# Patient Record
Sex: MALE
[De-identification: names, ages, dates, MRNs, and addresses within clinical notes are randomized per-mention and may not be internally consistent; named-entity substitution may affect disease eponyms.]

---

## 2017-03-31 ENCOUNTER — RECORD ABSTRACTING (OUTPATIENT)
Age: 82
End: 2017-03-31

## 2017-03-31 DIAGNOSIS — E79.0 HYPERURICEMIA W/OUT SIGNS OF INFLAMMATORY ARTHRITIS AND TOPHACEOUS DISEASE: ICD-10-CM

## 2017-03-31 DIAGNOSIS — K59.00 CONSTIPATION, UNSPECIFIED: ICD-10-CM

## 2017-03-31 DIAGNOSIS — Z78.9 OTHER SPECIFIED HEALTH STATUS: ICD-10-CM

## 2017-03-31 PROBLEM — Z00.00 ENCOUNTER FOR PREVENTIVE HEALTH EXAMINATION: Status: ACTIVE | Noted: 2017-03-31

## 2020-02-19 ENCOUNTER — APPOINTMENT (OUTPATIENT)
Dept: CARDIOLOGY | Facility: CLINIC | Age: 85
End: 2020-02-19
Payer: MEDICARE

## 2020-02-19 VITALS
HEART RATE: 78 BPM | DIASTOLIC BLOOD PRESSURE: 70 MMHG | HEIGHT: 61 IN | SYSTOLIC BLOOD PRESSURE: 130 MMHG | BODY MASS INDEX: 25.49 KG/M2 | WEIGHT: 135 LBS

## 2020-02-19 PROCEDURE — 93000 ELECTROCARDIOGRAM COMPLETE: CPT

## 2020-02-19 PROCEDURE — 99204 OFFICE O/P NEW MOD 45 MIN: CPT

## 2020-02-19 RX ORDER — PANTOPRAZOLE 40 MG/1
40 TABLET, DELAYED RELEASE ORAL DAILY
Qty: 30 | Refills: 0 | Status: ACTIVE | COMMUNITY

## 2020-02-19 RX ORDER — SENNOSIDES 8.6 MG TABLETS 8.6 MG/1
8.6 TABLET ORAL TWICE DAILY
Refills: 0 | Status: ACTIVE | COMMUNITY

## 2020-02-19 RX ORDER — ATORVASTATIN CALCIUM 40 MG/1
40 TABLET, FILM COATED ORAL
Qty: 30 | Refills: 5 | Status: ACTIVE | COMMUNITY

## 2020-02-19 NOTE — PHYSICAL EXAM
[General Appearance - Well Developed] : well developed [Normal Appearance] : normal appearance [Well Groomed] : well groomed [General Appearance - Well Nourished] : well nourished [No Deformities] : no deformities [General Appearance - In No Acute Distress] : no acute distress [Normal Conjunctiva] : the conjunctiva exhibited no abnormalities [Eyelids - No Xanthelasma] : the eyelids demonstrated no xanthelasmas [Normal Oral Mucosa] : normal oral mucosa [No Oral Pallor] : no oral pallor [No Oral Cyanosis] : no oral cyanosis [Normal Jugular Venous A Waves Present] : normal jugular venous A waves present [Normal Jugular Venous V Waves Present] : normal jugular venous V waves present [No Jugular Venous Shirley A Waves] : no jugular venous shirley A waves [Respiration, Rhythm And Depth] : normal respiratory rhythm and effort [Exaggerated Use Of Accessory Muscles For Inspiration] : no accessory muscle use [Auscultation Breath Sounds / Voice Sounds] : lungs were clear to auscultation bilaterally [Heart Rate And Rhythm] : heart rate and rhythm were normal [Heart Sounds] : normal S1 and S2 [Murmurs] : no murmurs present [Abdomen Soft] : soft [Abdomen Tenderness] : non-tender [Abdomen Mass (___ Cm)] : no abdominal mass palpated [Abnormal Walk] : normal gait [Gait - Sufficient For Exercise Testing] : the gait was sufficient for exercise testing [Nail Clubbing] : no clubbing of the fingernails [Cyanosis, Localized] : no localized cyanosis [Petechial Hemorrhages (___cm)] : no petechial hemorrhages [Skin Color & Pigmentation] : normal skin color and pigmentation [] : no rash [No Venous Stasis] : no venous stasis [Skin Lesions] : no skin lesions [No Skin Ulcers] : no skin ulcer [No Xanthoma] : no  xanthoma was observed [Affect] : the affect was normal [Mood] : the mood was normal [Oriented To Time, Place, And Person] : oriented to person, place, and time [No Anxiety] : not feeling anxious

## 2020-02-19 NOTE — HISTORY OF PRESENT ILLNESS
[FreeTextEntry1] : refered by dr. byrd for chest pain\par hypercholesteremia and htn are his risk factors for cad\par c/o exertional predictable chest pain, no associated sob\par always exertional, never at rest and relieved with rest\par no other cardiac problems\par \par ros is nbegative

## 2020-02-21 ENCOUNTER — APPOINTMENT (OUTPATIENT)
Dept: CARDIOLOGY | Facility: CLINIC | Age: 85
End: 2020-02-21
Payer: MEDICARE

## 2020-02-21 PROCEDURE — 93306 TTE W/DOPPLER COMPLETE: CPT

## 2020-02-25 ENCOUNTER — APPOINTMENT (OUTPATIENT)
Dept: SURGERY | Facility: CLINIC | Age: 85
End: 2020-02-25
Payer: MEDICARE

## 2020-02-25 VITALS — BODY MASS INDEX: 24.55 KG/M2 | WEIGHT: 130 LBS | HEIGHT: 61 IN

## 2020-02-25 DIAGNOSIS — K40.90 UNILATERAL INGUINAL HERNIA, W/OUT OBSTRUCTION OR GANGRENE, NOT SPECIFIED AS RECURRENT: ICD-10-CM

## 2020-02-25 DIAGNOSIS — K43.9 VENTRAL HERNIA W/OUT OBSTRUCTION OR GANGRENE: ICD-10-CM

## 2020-02-25 PROCEDURE — 99203 OFFICE O/P NEW LOW 30 MIN: CPT

## 2020-02-25 NOTE — PHYSICAL EXAM
[Normal Breath Sounds] : Normal breath sounds [No Rash or Lesion] : No rash or lesion [Alert] : alert [Calm] : calm [JVD] : no jugular venous distention  [de-identified] : soft and flat abdomen\par  [de-identified] : normal [de-identified] : healthy [de-identified] : normal testicles [de-identified] : large left inguinal hernia, left spigelian hernia, both reducible

## 2020-02-25 NOTE — ASSESSMENT
[FreeTextEntry1] : Kael is a pleasant 84-year-old retired [place] gentleman presenting to the office with his daughter-in-law (who acted as  at his request) with a past medical history significant for hypertension, hypercholesterolemia, GERD and psoriasis who has been complaining of worsening pain and swelling in the left groin suspicious for a hernia. He had a right inguinal hernia repair 10 years ago by a surgeon in [place].\par \par Physical examination demonstrates a large orange-sized tender bulge in the left groin which is reducible with a moderate degree of difficulty consistent with a large now symptomatic left inguinal hernia requiring surgical repair. Also, he has a another bulge in the left lower quadrant several fingerbreadths above this area the size of an egg which is only minimally tender and also easily reducible consistent with a left spigelian hernia deserving concomitant repair. Examination of his right groin demonstrates a mild weakness with no evidence of hernia recurrence. Both testicles are normal. His umbilical examination is unremarkable.\par \par I explained the pros and cons of surgery, as well as all risks, benefits, indications and alternatives of the procedure and the patient understood and agreed. He is currently in the process of undergoing a preoperative cardiac workup and will call us to schedule his procedure once he has been cleared. A complicating factor is that he is planning on spending 5 months in [place] beginning in mid March. I suggested that he consider pushing his trip back several weeks and having this procedure done prior to leaving to reduce the risk of having an emergency situation develop overseas. His daughter-in-law will call us once she sorts these issues out to schedule his procedure. Kael is aware that the repair of his left inguinal hernia with mesh and the repair of his left spigelian hernia with mesh will be done under LOCAL WITH IV SEDATION at [facility] with presurgical testing waived.  The patient was encouraged to avoid heavy lifting and strenuous activity in the interim, of course.\par \par Hopefully, his cardiologist will be kind enough to send us a short preoperative evaluation note once he has been cleared.

## 2020-02-25 NOTE — DATA REVIEWED
[FreeTextEntry1] : I reviewed his most recent echocardiogram which demonstrated normal left ventricular size and function with an estimated ejection fraction of 60-65%. However, he does have moderate to severe mitral regurgitation.

## 2020-02-25 NOTE — CONSULT LETTER
[Dear  ___] : Dear  [unfilled], [Courtesy Letter:] : I had the pleasure of seeing your patient, [unfilled], in my office today. [Please see my note below.] : Please see my note below. [Consult Closing:] : Thank you very much for allowing me to participate in the care of this patient.  If you have any questions, please do not hesitate to contact me. [DrScotty  ___] : Dr. BRYAN [FreeTextEntry3] : Respectfully,\par \par Felix Batista M.D., FACS\par

## 2020-02-26 ENCOUNTER — APPOINTMENT (OUTPATIENT)
Dept: CARDIOLOGY | Facility: CLINIC | Age: 85
End: 2020-02-26
Payer: MEDICARE

## 2020-02-26 VITALS
BODY MASS INDEX: 24.94 KG/M2 | HEART RATE: 109 BPM | SYSTOLIC BLOOD PRESSURE: 118 MMHG | WEIGHT: 132 LBS | DIASTOLIC BLOOD PRESSURE: 70 MMHG

## 2020-02-26 PROCEDURE — 93000 ELECTROCARDIOGRAM COMPLETE: CPT

## 2020-02-26 PROCEDURE — 99214 OFFICE O/P EST MOD 30 MIN: CPT

## 2020-02-26 NOTE — HISTORY OF PRESENT ILLNESS
[FreeTextEntry1] : refered by dr. byrd for chest pain\par hypercholesteremia and htn are his risk factors for cad\par c/o exertional predictable chest pain, with associated sob\par always exertional, never at rest and relieved with rest\par no other cardiac problems\par \par ros is nbegative\par \par echo revealed moderate to severe mitral insufficiency\par ekg shows nsr, asmi and possible iwmi

## 2020-02-26 NOTE — ASSESSMENT
[FreeTextEntry1] : cad\par exertional angina class 2\par mitral insufficiency - class 2-3 dyspnea\par htn\par increased cholesterol

## 2020-02-26 NOTE — PHYSICAL EXAM
[General Appearance - Well Developed] : well developed [Normal Appearance] : normal appearance [General Appearance - Well Nourished] : well nourished [No Deformities] : no deformities [Well Groomed] : well groomed [General Appearance - In No Acute Distress] : no acute distress [Normal Oral Mucosa] : normal oral mucosa [Eyelids - No Xanthelasma] : the eyelids demonstrated no xanthelasmas [Normal Conjunctiva] : the conjunctiva exhibited no abnormalities [No Oral Pallor] : no oral pallor [No Oral Cyanosis] : no oral cyanosis [Normal Jugular Venous A Waves Present] : normal jugular venous A waves present [Normal Jugular Venous V Waves Present] : normal jugular venous V waves present [No Jugular Venous Shirley A Waves] : no jugular venous shirley A waves [Exaggerated Use Of Accessory Muscles For Inspiration] : no accessory muscle use [Respiration, Rhythm And Depth] : normal respiratory rhythm and effort [Auscultation Breath Sounds / Voice Sounds] : lungs were clear to auscultation bilaterally [Murmurs] : no murmurs present [Heart Sounds] : normal S1 and S2 [Heart Rate And Rhythm] : heart rate and rhythm were normal [Abdomen Soft] : soft [Abdomen Tenderness] : non-tender [Abdomen Mass (___ Cm)] : no abdominal mass palpated [Abnormal Walk] : normal gait [Nail Clubbing] : no clubbing of the fingernails [Cyanosis, Localized] : no localized cyanosis [Gait - Sufficient For Exercise Testing] : the gait was sufficient for exercise testing [Petechial Hemorrhages (___cm)] : no petechial hemorrhages [No Venous Stasis] : no venous stasis [Skin Color & Pigmentation] : normal skin color and pigmentation [] : no rash [No Skin Ulcers] : no skin ulcer [Skin Lesions] : no skin lesions [No Xanthoma] : no  xanthoma was observed [Affect] : the affect was normal [Oriented To Time, Place, And Person] : oriented to person, place, and time [Mood] : the mood was normal [No Anxiety] : not feeling anxious

## 2020-02-29 ENCOUNTER — INPATIENT (INPATIENT)
Facility: HOSPITAL | Age: 85
LOS: 3 days | Discharge: ANOTHER HOSPITAL | End: 2020-03-04
Attending: INTERNAL MEDICINE | Admitting: INTERNAL MEDICINE
Payer: MEDICARE

## 2020-02-29 VITALS
HEIGHT: 63 IN | DIASTOLIC BLOOD PRESSURE: 76 MMHG | HEART RATE: 83 BPM | SYSTOLIC BLOOD PRESSURE: 156 MMHG | WEIGHT: 139.99 LBS | TEMPERATURE: 99 F | OXYGEN SATURATION: 98 %

## 2020-02-29 PROCEDURE — 99285 EMERGENCY DEPT VISIT HI MDM: CPT

## 2020-02-29 PROCEDURE — 93010 ELECTROCARDIOGRAM REPORT: CPT

## 2020-03-01 DIAGNOSIS — Z98.890 OTHER SPECIFIED POSTPROCEDURAL STATES: Chronic | ICD-10-CM

## 2020-03-01 LAB
ALBUMIN SERPL ELPH-MCNC: 3.8 G/DL — SIGNIFICANT CHANGE UP (ref 3.5–5.2)
ALBUMIN SERPL ELPH-MCNC: 4.3 G/DL — SIGNIFICANT CHANGE UP (ref 3.5–5.2)
ALP SERPL-CCNC: 52 U/L — SIGNIFICANT CHANGE UP (ref 30–115)
ALP SERPL-CCNC: 60 U/L — SIGNIFICANT CHANGE UP (ref 30–115)
ALT FLD-CCNC: 12 U/L — SIGNIFICANT CHANGE UP (ref 0–41)
ALT FLD-CCNC: 13 U/L — SIGNIFICANT CHANGE UP (ref 0–41)
ANION GAP SERPL CALC-SCNC: 12 MMOL/L — SIGNIFICANT CHANGE UP (ref 7–14)
ANION GAP SERPL CALC-SCNC: 13 MMOL/L — SIGNIFICANT CHANGE UP (ref 7–14)
AST SERPL-CCNC: 16 U/L — SIGNIFICANT CHANGE UP (ref 0–41)
AST SERPL-CCNC: 19 U/L — SIGNIFICANT CHANGE UP (ref 0–41)
BASOPHILS # BLD AUTO: 0.03 K/UL — SIGNIFICANT CHANGE UP (ref 0–0.2)
BASOPHILS # BLD AUTO: 0.05 K/UL — SIGNIFICANT CHANGE UP (ref 0–0.2)
BASOPHILS NFR BLD AUTO: 0.3 % — SIGNIFICANT CHANGE UP (ref 0–1)
BASOPHILS NFR BLD AUTO: 0.4 % — SIGNIFICANT CHANGE UP (ref 0–1)
BILIRUB SERPL-MCNC: 0.4 MG/DL — SIGNIFICANT CHANGE UP (ref 0.2–1.2)
BILIRUB SERPL-MCNC: 0.5 MG/DL — SIGNIFICANT CHANGE UP (ref 0.2–1.2)
BUN SERPL-MCNC: 15 MG/DL — SIGNIFICANT CHANGE UP (ref 10–20)
BUN SERPL-MCNC: 19 MG/DL — SIGNIFICANT CHANGE UP (ref 10–20)
CALCIUM SERPL-MCNC: 8.4 MG/DL — LOW (ref 8.5–10.1)
CALCIUM SERPL-MCNC: 8.8 MG/DL — SIGNIFICANT CHANGE UP (ref 8.5–10.1)
CHLORIDE SERPL-SCNC: 100 MMOL/L — SIGNIFICANT CHANGE UP (ref 98–110)
CHLORIDE SERPL-SCNC: 102 MMOL/L — SIGNIFICANT CHANGE UP (ref 98–110)
CHOLEST SERPL-MCNC: 139 MG/DL — SIGNIFICANT CHANGE UP (ref 100–200)
CO2 SERPL-SCNC: 25 MMOL/L — SIGNIFICANT CHANGE UP (ref 17–32)
CO2 SERPL-SCNC: 26 MMOL/L — SIGNIFICANT CHANGE UP (ref 17–32)
CREAT SERPL-MCNC: 1 MG/DL — SIGNIFICANT CHANGE UP (ref 0.7–1.5)
CREAT SERPL-MCNC: 1.1 MG/DL — SIGNIFICANT CHANGE UP (ref 0.7–1.5)
EOSINOPHIL # BLD AUTO: 0.02 K/UL — SIGNIFICANT CHANGE UP (ref 0–0.7)
EOSINOPHIL # BLD AUTO: 0.22 K/UL — SIGNIFICANT CHANGE UP (ref 0–0.7)
EOSINOPHIL NFR BLD AUTO: 0.2 % — SIGNIFICANT CHANGE UP (ref 0–8)
EOSINOPHIL NFR BLD AUTO: 1.6 % — SIGNIFICANT CHANGE UP (ref 0–8)
GLUCOSE SERPL-MCNC: 134 MG/DL — HIGH (ref 70–99)
GLUCOSE SERPL-MCNC: 97 MG/DL — SIGNIFICANT CHANGE UP (ref 70–99)
HCT VFR BLD CALC: 40.3 % — LOW (ref 42–52)
HCT VFR BLD CALC: 43.4 % — SIGNIFICANT CHANGE UP (ref 42–52)
HDLC SERPL-MCNC: 67 MG/DL — SIGNIFICANT CHANGE UP
HGB BLD-MCNC: 12.8 G/DL — LOW (ref 14–18)
HGB BLD-MCNC: 13.9 G/DL — LOW (ref 14–18)
IMM GRANULOCYTES NFR BLD AUTO: 0.5 % — HIGH (ref 0.1–0.3)
IMM GRANULOCYTES NFR BLD AUTO: 0.7 % — HIGH (ref 0.1–0.3)
LIPID PNL WITH DIRECT LDL SERPL: 68 MG/DL — SIGNIFICANT CHANGE UP (ref 4–129)
LYMPHOCYTES # BLD AUTO: 1.75 K/UL — SIGNIFICANT CHANGE UP (ref 1.2–3.4)
LYMPHOCYTES # BLD AUTO: 15.9 % — LOW (ref 20.5–51.1)
LYMPHOCYTES # BLD AUTO: 27.9 % — SIGNIFICANT CHANGE UP (ref 20.5–51.1)
LYMPHOCYTES # BLD AUTO: 3.83 K/UL — HIGH (ref 1.2–3.4)
MAGNESIUM SERPL-MCNC: 2.2 MG/DL — SIGNIFICANT CHANGE UP (ref 1.8–2.4)
MCHC RBC-ENTMCNC: 27.9 PG — SIGNIFICANT CHANGE UP (ref 27–31)
MCHC RBC-ENTMCNC: 28.4 PG — SIGNIFICANT CHANGE UP (ref 27–31)
MCHC RBC-ENTMCNC: 31.8 G/DL — LOW (ref 32–37)
MCHC RBC-ENTMCNC: 32 G/DL — SIGNIFICANT CHANGE UP (ref 32–37)
MCV RBC AUTO: 87.8 FL — SIGNIFICANT CHANGE UP (ref 80–94)
MCV RBC AUTO: 88.8 FL — SIGNIFICANT CHANGE UP (ref 80–94)
MONOCYTES # BLD AUTO: 0.77 K/UL — HIGH (ref 0.1–0.6)
MONOCYTES # BLD AUTO: 1.08 K/UL — HIGH (ref 0.1–0.6)
MONOCYTES NFR BLD AUTO: 7 % — SIGNIFICANT CHANGE UP (ref 1.7–9.3)
MONOCYTES NFR BLD AUTO: 7.9 % — SIGNIFICANT CHANGE UP (ref 1.7–9.3)
NEUTROPHILS # BLD AUTO: 8.37 K/UL — HIGH (ref 1.4–6.5)
NEUTROPHILS # BLD AUTO: 8.47 K/UL — HIGH (ref 1.4–6.5)
NEUTROPHILS NFR BLD AUTO: 61.5 % — SIGNIFICANT CHANGE UP (ref 42.2–75.2)
NEUTROPHILS NFR BLD AUTO: 76.1 % — HIGH (ref 42.2–75.2)
NRBC # BLD: 0 /100 WBCS — SIGNIFICANT CHANGE UP (ref 0–0)
NRBC # BLD: 0 /100 WBCS — SIGNIFICANT CHANGE UP (ref 0–0)
PLATELET # BLD AUTO: 186 K/UL — SIGNIFICANT CHANGE UP (ref 130–400)
PLATELET # BLD AUTO: 202 K/UL — SIGNIFICANT CHANGE UP (ref 130–400)
POTASSIUM SERPL-MCNC: 3.9 MMOL/L — SIGNIFICANT CHANGE UP (ref 3.5–5)
POTASSIUM SERPL-MCNC: 5 MMOL/L — SIGNIFICANT CHANGE UP (ref 3.5–5)
POTASSIUM SERPL-SCNC: 3.9 MMOL/L — SIGNIFICANT CHANGE UP (ref 3.5–5)
POTASSIUM SERPL-SCNC: 5 MMOL/L — SIGNIFICANT CHANGE UP (ref 3.5–5)
PROT SERPL-MCNC: 6.1 G/DL — SIGNIFICANT CHANGE UP (ref 6–8)
PROT SERPL-MCNC: 6.9 G/DL — SIGNIFICANT CHANGE UP (ref 6–8)
RBC # BLD: 4.59 M/UL — LOW (ref 4.7–6.1)
RBC # BLD: 4.89 M/UL — SIGNIFICANT CHANGE UP (ref 4.7–6.1)
RBC # FLD: 13.7 % — SIGNIFICANT CHANGE UP (ref 11.5–14.5)
RBC # FLD: 13.7 % — SIGNIFICANT CHANGE UP (ref 11.5–14.5)
SODIUM SERPL-SCNC: 138 MMOL/L — SIGNIFICANT CHANGE UP (ref 135–146)
SODIUM SERPL-SCNC: 140 MMOL/L — SIGNIFICANT CHANGE UP (ref 135–146)
TOTAL CHOLESTEROL/HDL RATIO MEASUREMENT: 2.1 RATIO — LOW (ref 4–5.5)
TRIGL SERPL-MCNC: 72 MG/DL — SIGNIFICANT CHANGE UP (ref 10–149)
TROPONIN T SERPL-MCNC: <0.01 NG/ML — SIGNIFICANT CHANGE UP
WBC # BLD: 11 K/UL — HIGH (ref 4.8–10.8)
WBC # BLD: 13.74 K/UL — HIGH (ref 4.8–10.8)
WBC # FLD AUTO: 11 K/UL — HIGH (ref 4.8–10.8)
WBC # FLD AUTO: 13.74 K/UL — HIGH (ref 4.8–10.8)

## 2020-03-01 PROCEDURE — 99231 SBSQ HOSP IP/OBS SF/LOW 25: CPT

## 2020-03-01 PROCEDURE — 99223 1ST HOSP IP/OBS HIGH 75: CPT

## 2020-03-01 PROCEDURE — 71045 X-RAY EXAM CHEST 1 VIEW: CPT | Mod: 26

## 2020-03-01 RX ORDER — LISINOPRIL 2.5 MG/1
10 TABLET ORAL DAILY
Refills: 0 | Status: DISCONTINUED | OUTPATIENT
Start: 2020-03-01 | End: 2020-03-04

## 2020-03-01 RX ORDER — INFLUENZA VIRUS VACCINE 15; 15; 15; 15 UG/.5ML; UG/.5ML; UG/.5ML; UG/.5ML
0.5 SUSPENSION INTRAMUSCULAR ONCE
Refills: 0 | Status: DISCONTINUED | OUTPATIENT
Start: 2020-03-01 | End: 2020-03-04

## 2020-03-01 RX ORDER — SENNA PLUS 8.6 MG/1
2 TABLET ORAL AT BEDTIME
Refills: 0 | Status: DISCONTINUED | OUTPATIENT
Start: 2020-03-01 | End: 2020-03-04

## 2020-03-01 RX ORDER — ASPIRIN/CALCIUM CARB/MAGNESIUM 324 MG
325 TABLET ORAL ONCE
Refills: 0 | Status: COMPLETED | OUTPATIENT
Start: 2020-03-01 | End: 2020-03-01

## 2020-03-01 RX ORDER — ATORVASTATIN CALCIUM 80 MG/1
40 TABLET, FILM COATED ORAL AT BEDTIME
Refills: 0 | Status: DISCONTINUED | OUTPATIENT
Start: 2020-03-01 | End: 2020-03-02

## 2020-03-01 RX ORDER — ASPIRIN/CALCIUM CARB/MAGNESIUM 324 MG
81 TABLET ORAL DAILY
Refills: 0 | Status: DISCONTINUED | OUTPATIENT
Start: 2020-03-01 | End: 2020-03-04

## 2020-03-01 RX ORDER — ENOXAPARIN SODIUM 100 MG/ML
40 INJECTION SUBCUTANEOUS AT BEDTIME
Refills: 0 | Status: DISCONTINUED | OUTPATIENT
Start: 2020-03-01 | End: 2020-03-04

## 2020-03-01 RX ORDER — CHLORHEXIDINE GLUCONATE 213 G/1000ML
1 SOLUTION TOPICAL
Refills: 0 | Status: DISCONTINUED | OUTPATIENT
Start: 2020-03-01 | End: 2020-03-04

## 2020-03-01 RX ORDER — PANTOPRAZOLE SODIUM 20 MG/1
40 TABLET, DELAYED RELEASE ORAL
Refills: 0 | Status: DISCONTINUED | OUTPATIENT
Start: 2020-03-01 | End: 2020-03-04

## 2020-03-01 RX ADMIN — SENNA PLUS 2 TABLET(S): 8.6 TABLET ORAL at 21:30

## 2020-03-01 RX ADMIN — ENOXAPARIN SODIUM 40 MILLIGRAM(S): 100 INJECTION SUBCUTANEOUS at 21:31

## 2020-03-01 RX ADMIN — Medication 81 MILLIGRAM(S): at 11:04

## 2020-03-01 RX ADMIN — LISINOPRIL 10 MILLIGRAM(S): 2.5 TABLET ORAL at 16:37

## 2020-03-01 RX ADMIN — Medication 325 MILLIGRAM(S): at 03:14

## 2020-03-01 RX ADMIN — ATORVASTATIN CALCIUM 40 MILLIGRAM(S): 80 TABLET, FILM COATED ORAL at 21:29

## 2020-03-01 NOTE — CONSULT NOTE ADULT - SUBJECTIVE AND OBJECTIVE BOX
Date of Admission: 2/29    CHIEF COMPLAINT: chest pain    HISTORY OF PRESENT ILLNESS:  83 YO M with PMH of HTN, HLD, moderate to severe MR, comes to the ED with chief c/o chest pain and palpitations. Pt. speaks Mohawk and history obtained from pt and son, upon request at bedside. Yesterday, pt started to have sudden onset palpitations, when he was sitting, after his dinner. Son states that he has had palpitations in the past- but not this severe. Episodes are usually intermittent and  lasted for about 1 hour- before self resolving yesterday. Pt also was complaining of simultaneous chest pain- L sided, non radiating, needle like 7/ 10, not a/w SOB, cough.     Pt. was seen in the office recently by Dr. Jonathan Lira - and was being planned for cardiac cath in march for episodes of exertional chest pain that he has been having. Pt. denies any headache fever, chills, abdominal pain, diarrhea, constipation.     In the ER EKG showed NSR, with no ischemic ST-T changes. Received ASA 325in ED.       PAST MEDICAL & SURGICAL HISTORY:  HLD (hyperlipidemia)  HTN (hypertension)  moderate to severe MR  S/P hernia surgery      FAMILY HISTORY:  [x] no pertinent family history of premature cardiovascular disease in first degree relatives.  Mother:   Father:   Siblings:     SOCIAL HISTORY:    [ ] Non-smoker  [x] Former Smoker  [ ] Alcohol    Allergies    penicillin (Unknown)    Intolerances    	    REVIEW OF SYSTEMS:  CONSTITUTIONAL: No fever, weight loss, or fatigue  CARDIOLOGY: denies shortness of breath or syncopal episodes.   RESPIRATORY: denies shortness of breath, wheezing.   NEUROLOGICAL: No weakness, no focal deficits to report.  ENDOCRINOLOGICAL: no recent change in diabetic medications.   GI: no BRBPR, no N,V, diarrhea.    PSYCHIATRY: normal mood and affect  HEENT: no nasal discharge, no ecchymosis  SKIN: no ecchymosis, no breakdown  MUSCULOSKELETAL: Full range of motion x4.     PHYSICAL EXAM:  T(C): 37.1 (03-01-20 @ 07:56), Max: 37.2 (03-01-20 @ 03:00)  HR: 76 (03-01-20 @ 07:56) (76 - 83)  BP: 134/75 (03-01-20 @ 07:56) (121/70 - 156/76)  RR: 18 (03-01-20 @ 07:56) (18 - 19)  SpO2: 98% (03-01-20 @ 07:56) (97% - 98%)  Wt(kg): --  I&O's Summary      General Appearance: well appearing, normal for age and gender. 	  Neck: normal JVP, no bruit.   Eyes: No xanthelasma, Extra ocular muscles intact.   Cardiovascular: regular rate and rhythm S1 S2, No JVD, ejection systolic murmurs, No edema  Respiratory: Lungs clear to auscultation	  Psychiatry: Alert and oriented x 3, Mood & affect appropriate  Gastrointestinal:  Soft, Non-tender  Skin/Integument: No rashes, No ecchymosis, No cyanosis	  Neurologic: Non-focal  Musculoskeletal/ extremities: Normal range of motion, No clubbing, cyanosis or edema  Vascular: Peripheral pulses palpable 2+ bilaterally    LABS:	 	                          13.9   11.00 )-----------( 202      ( 01 Mar 2020 00:35 )             43.4     03-01    138  |  100  |  19  ----------------------------<  134<H>  5.0   |  25  |  1.1    Ca    8.8      01 Mar 2020 00:35  Mg     2.2     03-01    TPro  6.9  /  Alb  4.3  /  TBili  0.5  /  DBili  x   /  AST  19  /  ALT  13  /  AlkPhos  60  03-01    CARDIAC MARKERS ( 01 Mar 2020 00:35 )  x     / <0.01 ng/mL / x     / x     / x          TELEMETRY EVENTS: 	    none    ECG:  	  NSR @81bpm    RADIOLOGY:  CXR:   no radiographic evidence of acute cardiopulmonary disease    PREVIOUS DIAGNOSTIC TESTING:    [ ] Echocardiogram:      [ ]  Catheterization:      [ ] Stress Test:  	  	    Home Medications:  atorvastatin 40 mg oral tablet: 1 tab(s) orally once a day (01 Mar 2020 09:10)  lisinopril 10 mg oral tablet: 1 tab(s) orally once a day (01 Mar 2020 09:10)  pantoprazole 40 mg oral delayed release tablet: 1 tab(s) orally once a day (01 Mar 2020 09:10)  Senna 8.6 mg oral tablet: 1 tab(s) orally once a day (at bedtime) (01 Mar 2020 09:10)    MEDICATIONS  (STANDING):  aspirin  chewable 81 milliGRAM(s) Oral daily  atorvastatin 40 milliGRAM(s) Oral at bedtime  chlorhexidine 4% Liquid 1 Application(s) Topical <User Schedule>  enoxaparin Injectable 40 milliGRAM(s) SubCutaneous at bedtime  influenza   Vaccine 0.5 milliLiter(s) IntraMuscular once  lisinopril 10 milliGRAM(s) Oral daily  pantoprazole    Tablet 40 milliGRAM(s) Oral before breakfast  senna 2 Tablet(s) Oral at bedtime    MEDICATIONS  (PRN): Date of Admission: 2/29    CHIEF COMPLAINT: chest pain    HISTORY OF PRESENT ILLNESS:  83 YO M with PMH of HTN, HLD, moderate to severe MR, comes to the ED with chief c/o chest pain and palpitations. Pt. speaks Uz[place] and history obtained from pt and son, upon request at bedside. Yesterday, pt started to have sudden onset palpitations, when he was sitting, after his dinner. Son states that he has had palpitations in the past- but not this severe. Episodes are usually intermittent and  lasted for about 1 hour- before self resolving yesterday. Pt also was complaining of simultaneous chest pain- L sided, non radiating, needle like 7/ 10, not a/w SOB, cough.     Pt. was seen in the office recently by Dr. Jonathan Lira - and was being planned for cardiac cath in march for episodes of exertional chest pain that he has been having. Pt. denies any headache fever, chills, abdominal pain, diarrhea, constipation.     In the ER EKG showed NSR, with no ischemic ST-T changes. Received ASA 325in ED.       PAST MEDICAL & SURGICAL HISTORY:  HLD (hyperlipidemia)  HTN (hypertension)  moderate to severe MR  S/P hernia surgery      FAMILY HISTORY:  [x] no pertinent family history of premature cardiovascular disease in first degree relatives.  Mother:   Father:   Siblings:     SOCIAL HISTORY:    [ ] Non-smoker  [x] Former Smoker  [ ] Alcohol    Allergies    penicillin (Unknown)    Intolerances    	  REVIEW OF SYSTEMS:  CONSTITUTIONAL: No fever, weight loss, fatigue  NECK: No pain or stiffness  RESPIRATORY: No cough, wheezing, shortness of breath  CARDIOVASCULAR: See HPI  GASTROINTESTINAL: No abdominal/epigastric pain, nausea, vomiting, hematemesis, diarrhea, constipation, melena or hematochezia  GENITOURINARY: No dysuria, frequency, hematuria, incontinence  NEUROLOGICAL: No headaches, memory loss, loss of strength, numbness, tremors  SKIN: No itching, burning, rashes, lesions   ENDOCRINE: No heat/cold intolerance or hair loss  MUSCULOSKELETAL: No joint pain or swelling  HEME/LYMPH: No easy bruising or bleeding gums    PHYSICAL EXAM:  T(C): 37.1 (03-01-20 @ 07:56), Max: 37.2 (03-01-20 @ 03:00)  HR: 76 (03-01-20 @ 07:56) (76 - 83)  BP: 134/75 (03-01-20 @ 07:56) (121/70 - 156/76)  RR: 18 (03-01-20 @ 07:56) (18 - 19)  SpO2: 98% (03-01-20 @ 07:56) (97% - 98%)  Wt(kg): --  I&O's Summary      General Appearance: well appearing, normal for age and gender. 	  Neck: normal JVP, no bruit.   Eyes: No xanthelasma, Extra ocular muscles intact.   Cardiovascular: regular rate and rhythm S1 S2, No JVD, ejection systolic murmurs, No edema  Respiratory: Lungs clear to auscultation	  Psychiatry: Alert and oriented x 3, Mood & affect appropriate  Gastrointestinal:  Soft, Non-tender  Skin/Integument: No rashes, No ecchymosis, No cyanosis	  Neurologic: Non-focal  Musculoskeletal/ extremities: Normal range of motion, No clubbing, cyanosis or edema  Vascular: Peripheral pulses palpable 2+ bilaterally    LABS:	 	                          13.9   11.00 )-----------( 202      ( 01 Mar 2020 00:35 )             43.4     03-01    138  |  100  |  19  ----------------------------<  134<H>  5.0   |  25  |  1.1    Ca    8.8      01 Mar 2020 00:35  Mg     2.2     03-01    TPro  6.9  /  Alb  4.3  /  TBili  0.5  /  DBili  x   /  AST  19  /  ALT  13  /  AlkPhos  60  03-01    CARDIAC MARKERS ( 01 Mar 2020 00:35 )  x     / <0.01 ng/mL / x     / x     / x          TELEMETRY EVENTS: 	    none    ECG:  	  NSR @81bpm    RADIOLOGY:  CXR:   no radiographic evidence of acute cardiopulmonary disease    	    Home Medications:  atorvastatin 40 mg oral tablet: 1 tab(s) orally once a day (01 Mar 2020 09:10)  lisinopril 10 mg oral tablet: 1 tab(s) orally once a day (01 Mar 2020 09:10)  pantoprazole 40 mg oral delayed release tablet: 1 tab(s) orally once a day (01 Mar 2020 09:10)  Senna 8.6 mg oral tablet: 1 tab(s) orally once a day (at bedtime) (01 Mar 2020 09:10)    MEDICATIONS  (STANDING):  aspirin  chewable 81 milliGRAM(s) Oral daily  atorvastatin 40 milliGRAM(s) Oral at bedtime  chlorhexidine 4% Liquid 1 Application(s) Topical <User Schedule>  enoxaparin Injectable 40 milliGRAM(s) SubCutaneous at bedtime  influenza   Vaccine 0.5 milliLiter(s) IntraMuscular once  lisinopril 10 milliGRAM(s) Oral daily  pantoprazole    Tablet 40 milliGRAM(s) Oral before breakfast  senna 2 Tablet(s) Oral at bedtime

## 2020-03-01 NOTE — ED PROVIDER NOTE - NS ED ROS FT
Constitutional: (-) fever  Eyes/ENT: (-) blurry vision, (-) epistaxis  Cardiovascular: (+) chest pain, (+) palpitations, (-) syncope  Respiratory: (-) cough, (-) shortness of breath  Gastrointestinal: (-) vomiting, (-) diarrhea  : (-) dysuria, (-) hematuria  Musculoskeletal: (-) neck pain, (-) back pain, (-) joint pain  Integumentary: (-) rash, (-) edema  Neurological: (-) headache, (-) altered mental status  Allergic/Immunologic: (-) pruritus

## 2020-03-01 NOTE — CONSULT NOTE ADULT - ATTENDING COMMENTS
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

## 2020-03-01 NOTE — ED ADULT NURSE NOTE - OBJECTIVE STATEMENT
Pt c/o of CP 8/10 left midsternal non-radiating and palpitations. Pt awake and alert, calm and comfortable. Cardiac monitoring initiated. HR 80's and regular. EKG done. MD made aware. Will continue to monitor for any changes.

## 2020-03-01 NOTE — H&P ADULT - NSHPLABSRESULTS_GEN_ALL_CORE
13.9   11.00 )-----------( 202      ( 01 Mar 2020 00:35 )             43.4       03-01    138  |  100  |  19  ----------------------------<  134<H>  5.0   |  25  |  1.1    Ca    8.8      01 Mar 2020 00:35  Mg     2.2     03-01    TPro  6.9  /  Alb  4.3  /  TBili  0.5  /  DBili  x   /  AST  19  /  ALT  13  /  AlkPhos  60  03-01            CARDIAC MARKERS ( 01 Mar 2020 00:35 )  x     / <0.01 ng/mL / x     / x     / x        < from: 12 Lead ECG (02.29.20 @ 22:42) >      Diagnosis Line Normal sinus rhythm  Possible Left atrial enlargement  Borderline ECG    < end of copied text >

## 2020-03-01 NOTE — ED PROVIDER NOTE - PHYSICAL EXAMINATION
CONST: NAD  EYES: Sclera and conjunctiva clear.   ENT: No nasal discharge. Oropharynx normal appearing, no erythema or exudates. No abscess or swelling. Uvula midline.   NECK: Non-tender, no meningeal signs. normal ROM. supple   CARD: S1 S2; No jvd  RESP: Equal BS B/L, No wheezes, rhonchi or rales. No distress  GI: Soft, non-tender, non-distended. no cva tenderness. normal BS  MS: Normal ROM in all extremities. pulses 2 +. no calf tenderness or swelling  SKIN: Warm, dry, no acute rashes. Good turgor  NEURO: A&Ox4

## 2020-03-01 NOTE — ED PROVIDER NOTE - CLINICAL SUMMARY MEDICAL DECISION MAKING FREE TEXT BOX
patiente valuate dfor chest pain, given possibility of catherization which was deemed to be done as outpatient we will admit patient as he is symptaotmica.

## 2020-03-01 NOTE — PATIENT PROFILE ADULT - NSPROEDAABILITYLEARN_GEN_A_NUR
[facility]  Lung Transplant  Progress Note - Floor    Patient Name: Sue Smith  MRN: [id number]  Admission Date: 4/5/2018  Hospital Length of Stay: 15 days  Post-Operative Day:   Attending Physician: Jacky Wong Jr.,*  Primary Care Provider: Paddy Guerrier DO     Subjective:     Interval History: No acute events overnight. Patient continues to undergo aggressive diuresis per primary team.  Patient expressed to lung transplant team that she does not have the social or family support for lung transplant and that she is unwilling to stay in [place] following transplantation. Per Dr. Truong and team, lung transplant will sign off on patient at this time.     Continuous Infusions:   furosemide (LASIX) 2 mg/mL infusion (non-titrating) 40 mg/hr (04/20/18 0304)    treprostinil (REMODULIN) infusion 75 ng/kg/min (04/19/18 1735)    veletri/remodulin cassette      veletri/remodulin tubing       Scheduled Meds:   busPIRone  15 mg Oral TID    desvenlafaxine succinate  100 mg Oral Daily    enoxaparin  40 mg Subcutaneous Daily    fluticasone-vilanterol  1 puff Inhalation Daily    gabapentin  100 mg Oral TID    lamoTRIgine  100 mg Oral BID    levothyroxine  75 mcg Oral Daily    lurasidone  40 mg Oral Daily    magnesium oxide  400 mg Oral BID    pantoprazole  40 mg Oral Daily    potassium chloride  60 mEq Oral Q4H While awake    pravastatin  40 mg Oral Daily    riociguat  0.5 mg Oral TID    sodium chloride 0.9%  3 mL Intravenous Q8H    spironolactone  50 mg Oral BID    tiotropium  1 capsule Inhalation Daily     PRN Meds:acetaminophen, hydrocodone-acetaminophen 10-325mg, loperamide, ondansetron    Review of patient's allergies indicates:   Allergen Reactions    Sulfa (sulfonamide antibiotics) Rash       Review of Systems   Constitutional: Negative for activity change, appetite change, chills and fever.   HENT: Negative for congestion, postnasal drip and rhinorrhea.    Eyes:  Negative.    Respiratory: Negative for cough, chest tightness, shortness of breath and wheezing.    Cardiovascular: Negative for chest pain, palpitations and leg swelling.   Gastrointestinal: Negative for diarrhea, nausea and vomiting.   Endocrine: Negative.    Genitourinary: Negative.    Musculoskeletal: Negative for arthralgias and myalgias.   Skin: Negative for color change and pallor.   Allergic/Immunologic: Negative.    Neurological: Negative for dizziness, light-headedness and headaches.   Hematological: Negative for adenopathy. Does not bruise/bleed easily.   Psychiatric/Behavioral: Negative for agitation, behavioral problems and confusion.     Objective:   Physical Exam   Constitutional: She is oriented to person, place, and time. She appears well-developed and well-nourished.   HENT:   Head: Normocephalic and atraumatic.   Eyes: EOM are normal. Pupils are equal, round, and reactive to light.   Neck: Normal range of motion. Neck supple. JVD present.   Cardiovascular: Normal rate, regular rhythm and normal heart sounds.    Pulmonary/Chest: Effort normal and breath sounds normal. No respiratory distress. She has no wheezes. She has no rales. She exhibits no tenderness.   Abdominal: Soft. Bowel sounds are normal. She exhibits no distension. There is no tenderness.   Musculoskeletal: She exhibits edema (BLE).   Neurological: She is alert and oriented to person, place, and time.   Skin: Skin is warm and dry. Capillary refill takes less than 2 seconds.   Psychiatric: She has a normal mood and affect. Her behavior is normal.   Nursing note and vitals reviewed.        Vital Signs (Most Recent):  Temp: 98.4 °F (36.9 °C) (04/20/18 0841)  Pulse: 89 (04/20/18 0841)  Resp: 20 (04/20/18 0841)  BP: (!) 100/50 (04/20/18 0841)  SpO2: (!) 90 % (04/20/18 0841) Vital Signs (24h Range):  Temp:  [97.2 °F (36.2 °C)-99 °F (37.2 °C)] 98.4 °F (36.9 °C)  Pulse:  [69-98] 89  Resp:  [16-22] 20  SpO2:  [90 %-94 %] 90 %  BP:  (100-118)/(50-74) 100/50     Weight: 81.5 kg (179 lb 10.8 oz)  Body mass index is 32.85 kg/m².      Intake/Output Summary (Last 24 hours) at 04/20/18 1027  Last data filed at 04/20/18 0500   Gross per 24 hour   Intake             1617 ml   Output             3450 ml   Net            -1833 ml       Significant Labs:  CBC:    Recent Labs  Lab 04/20/18  0457   WBC 4.91   RBC 5.38   HGB 14.6   HCT 45.2      MCV 84   MCH 27.1   MCHC 32.3     BMP:    Recent Labs  Lab 04/20/18  0457      K 3.1*   CL 95   CO2 32*   BUN 32*   CREATININE 1.5*   CALCIUM 10.3      Tacrolimus Levels:  No results for input(s): TACROLIMUS in the last 72 hours.  Microbiology:  Microbiology Results (last 7 days)     ** No results found for the last 168 hours. **          I have reviewed all pertinent labs within the past 24 hours.    Diagnostic Results:  Labs: Reviewed         Assessment/Plan:     * Acute on chronic diastolic heart failure    Continue care per primary team.         Lung transplant candidate    Patient is currently unwilling to stay in [place] for allotted time required following lung transplantation. Patient also states that she will not have family or social support surrounding the lung transplantation process. Patient does not wish to proceed with lung transplant evaluation at this time.  No further labs or imaging required. Lung transplant will sign off on patient per Dr. Truong and team.         Chronic respiratory failure with hypoxia    Likely multifactorial due to severe PHTN and diastolic heart failure - Continue 2L NC during day and 35% FiO2 on Bipap at night. Will consider decreasing FiO2 on bipap machine overnight depending on O2 saturation.     Lung transplant team to sign off on patient.         Pulmonary hypertension, group 1    Continue Adempas and Remodulin per primary team.                 Isela Warren PA-C  Lung Transplant  [facility]   language

## 2020-03-01 NOTE — ED PROVIDER NOTE - ATTENDING CONTRIBUTION TO CARE
chest pain that occurred today that was sharp and left sided lasting for 1 hr which occurred at rest and resolved on its own. he recently had an echo with dr. yañez and plan was to proceed with catherization next friday. currently patient is asymptomatic. given his age we will obtain cardiac enzymes, ekg and cxr to evaluate for acs.

## 2020-03-01 NOTE — CONSULT NOTE ADULT - ASSESSMENT
85 YO M with PMH of HTN, HLD, moderate to severe MR on a recent outpatient echo, comes to the ED with chief c/o chest pain and palpitations.    Impression:    HTN  moderate to severe MR  Exertional Chest pain / Unstable Angina, with risk factors for CAD    Recommend:    - Repeat EKG, obtain serial cardiac enzymes  - continue with ASA and statin  - check lipid profile, HBA1C  - will need LHC to rule out obstructive CAD  - Keep NPO past midnight 83 YO M with PMH of HTN, HLD, moderate to severe MR on a recent outpatient echo, comes to the ED with chief c/o chest pain and palpitations.    Impression:    HTN  moderate to severe MR  Atypical chest pain    Recommend:    - Repeat EKG, obtain serial cardiac enzymes  - continue with ASA and statin  - check lipid profile, HBA1C  - will need LHC to rule out obstructive CAD  - Keep NPO past midnight

## 2020-03-01 NOTE — H&P ADULT - ASSESSMENT
85yo M with PMH of HTN, HLD comes to the ED with chief c/o chest pain and palpitations.     #) Palpitations a/w chest pain ( r/o Paroxysmal arrythmia)  - r/o Paroxysmal A fib with Risk factors of HTN, +ve heart murmur on exam.   - presently in NSR, HD stable,  c/w tele monitoring  - check echo, TSH  - associated chest pain- possibly in setting of sustained tachycardia with some component of CAD- Atypical chest pain on admission  - has avtar following Dr Lira- as outpt- was documented to have exertional chest pain in records and was being planned for Cardiac cath in march 2020  - Will request cardiology eval for ischemic workup  - Chadvasc 3- if has Afib/ aflutter will need AC  - may request EP for event monitor/ loop if no events on tele  - s/p ASA 325in ED, will start on ASA 81    #) h./o HTN  - c/w lisinopril    #) h/o HLD  - c/w statin  - check hba1c, lipid profile    #)h/o GERD?  - c/w protonix      #) diet- dash  #) dvt ppx- lovenox  #) gi ppx- protonix  #) ambulate as tolerated  #) dispo- post cardiac eval 83yo M with PMH of HTN, HLD comes to the ED with chief c/o chest pain and palpitations.     #) Palpitations a/w chest pain ( r/o Paroxysmal arrythmia)  - r/o Paroxysmal A fib with Risk factors of HTN, +ve heart murmur on exam.   - presently in NSR, HD stable,  c/w tele monitoring  - check echo, TSH  - associated chest pain- possibly in setting of sustained tachycardia with some component of CAD- Atypical chest pain on admission  - has avtar following Dr Lira- as outpt- was documented to have exertional chest pain in records and was being planned for Cardiac cath in march 2020  - Will request cardiology eval for ischemic workup  - Chadvasc 3- if has Afib/ aflutter will need AC  - may request EP for event monitor/ loop if no events on tele  - s/p ASA 325in ED, will start on ASA 81  - check serial trop, 1set negative in ED.    #) h./o HTN  - c/w lisinopril    #) h/o HLD  - c/w statin  - check hba1c, lipid profile    #)h/o GERD?  - c/w protonix      #) diet- dash  #) dvt ppx- lovenox  #) gi ppx- protonix  #) ambulate as tolerated  #) dispo- post cardiac eval

## 2020-03-01 NOTE — ED PROVIDER NOTE - OBJECTIVE STATEMENT
84y M pmh htn, hld presents for eval of palpitations. Pt has 1wk of palpitations that became acutly more severe today with associated L sided cp, no aggravating or relieving factors. Cardiologist Sharmin

## 2020-03-01 NOTE — H&P ADULT - NSHPPHYSICALEXAM_GEN_ALL_CORE
PHYSICAL EXAM:  GENERAL: NAD, well-developed  HEAD:  Atraumatic, Normocephalic  EYES: EOMI, PERRLA, conjunctiva and sclera clear  NECK: Supple, No JVD  CHEST/LUNG: Clear to auscultation bilaterally; No wheeze  HEART: Regular rate and rhythm; + murmur in cardiac apex- L systolic- holosystolic likely.   ABDOMEN: Soft, Nontender, Nondistended; Bowel sounds present  EXTREMITIES:  2+ Peripheral Pulses, No clubbing, cyanosis, or edema  PSYCH: AAOx3  NEUROLOGY: non-focal  SKIN: No rashes or lesions

## 2020-03-01 NOTE — H&P ADULT - HISTORY OF PRESENT ILLNESS
Pt is a 85yo M with PMH of HTN, HLD comes to the ED with chief c/o chest pain and palpitations.   HPI obtained from pt and son at bedside. Yesterday, pt started to have sudden onset palpitations, when he was sitting, after his dinner. Son states that he has had palpitations in the past- but not this severe. Episodes are usually intermittent and  lasted for about 1 hour- before self resolving yesterday. Pt also was complaining of simultaneous chest pain- L sided, non radiating, needle like 7/ 10, not a/w SOB, cough.   Pt recently saw dr Lira in the outpt setting- and was being planned for cardiac cath in march for episodes of exertional chest pain that he has been having.   Denies any headache fever, chills, Abdominal pain, diarrhea, constipation.   VS on arrival- noted to be stable. Labs unremarkable. EKG with NSR on admission.  Received ASA 325in ED.

## 2020-03-02 PROBLEM — I10 ESSENTIAL (PRIMARY) HYPERTENSION: Chronic | Status: ACTIVE | Noted: 2020-03-01

## 2020-03-02 PROBLEM — E78.5 HYPERLIPIDEMIA, UNSPECIFIED: Chronic | Status: ACTIVE | Noted: 2020-03-01

## 2020-03-02 LAB
ALBUMIN SERPL ELPH-MCNC: 3.6 G/DL — SIGNIFICANT CHANGE UP (ref 3.5–5.2)
ALP SERPL-CCNC: 45 U/L — SIGNIFICANT CHANGE UP (ref 30–115)
ALT FLD-CCNC: 12 U/L — SIGNIFICANT CHANGE UP (ref 0–41)
ANION GAP SERPL CALC-SCNC: 9 MMOL/L — SIGNIFICANT CHANGE UP (ref 7–14)
AST SERPL-CCNC: 15 U/L — SIGNIFICANT CHANGE UP (ref 0–41)
BASOPHILS # BLD AUTO: 0.05 K/UL — SIGNIFICANT CHANGE UP (ref 0–0.2)
BASOPHILS NFR BLD AUTO: 0.4 % — SIGNIFICANT CHANGE UP (ref 0–1)
BILIRUB SERPL-MCNC: 0.5 MG/DL — SIGNIFICANT CHANGE UP (ref 0.2–1.2)
BUN SERPL-MCNC: 11 MG/DL — SIGNIFICANT CHANGE UP (ref 10–20)
CALCIUM SERPL-MCNC: 8.5 MG/DL — SIGNIFICANT CHANGE UP (ref 8.5–10.1)
CHLORIDE SERPL-SCNC: 101 MMOL/L — SIGNIFICANT CHANGE UP (ref 98–110)
CHOLEST SERPL-MCNC: 143 MG/DL — SIGNIFICANT CHANGE UP (ref 100–200)
CO2 SERPL-SCNC: 27 MMOL/L — SIGNIFICANT CHANGE UP (ref 17–32)
CREAT SERPL-MCNC: 1.1 MG/DL — SIGNIFICANT CHANGE UP (ref 0.7–1.5)
EOSINOPHIL # BLD AUTO: 0.2 K/UL — SIGNIFICANT CHANGE UP (ref 0–0.7)
EOSINOPHIL NFR BLD AUTO: 1.6 % — SIGNIFICANT CHANGE UP (ref 0–8)
ESTIMATED AVERAGE GLUCOSE: 108 MG/DL — SIGNIFICANT CHANGE UP (ref 68–114)
ESTIMATED AVERAGE GLUCOSE: 114 MG/DL — SIGNIFICANT CHANGE UP (ref 68–114)
GLUCOSE SERPL-MCNC: 101 MG/DL — HIGH (ref 70–99)
HBA1C BLD-MCNC: 5.4 % — SIGNIFICANT CHANGE UP (ref 4–5.6)
HBA1C BLD-MCNC: 5.6 % — SIGNIFICANT CHANGE UP (ref 4–5.6)
HCT VFR BLD CALC: 39.4 % — LOW (ref 42–52)
HDLC SERPL-MCNC: 67 MG/DL — SIGNIFICANT CHANGE UP
HGB BLD-MCNC: 12.4 G/DL — LOW (ref 14–18)
IMM GRANULOCYTES NFR BLD AUTO: 0.7 % — HIGH (ref 0.1–0.3)
LIPID PNL WITH DIRECT LDL SERPL: 66 MG/DL — SIGNIFICANT CHANGE UP (ref 4–129)
LYMPHOCYTES # BLD AUTO: 27.5 % — SIGNIFICANT CHANGE UP (ref 20.5–51.1)
LYMPHOCYTES # BLD AUTO: 3.53 K/UL — HIGH (ref 1.2–3.4)
MAGNESIUM SERPL-MCNC: 2.1 MG/DL — SIGNIFICANT CHANGE UP (ref 1.8–2.4)
MCHC RBC-ENTMCNC: 27 PG — SIGNIFICANT CHANGE UP (ref 27–31)
MCHC RBC-ENTMCNC: 31.5 G/DL — LOW (ref 32–37)
MCV RBC AUTO: 85.7 FL — SIGNIFICANT CHANGE UP (ref 80–94)
MONOCYTES # BLD AUTO: 1.16 K/UL — HIGH (ref 0.1–0.6)
MONOCYTES NFR BLD AUTO: 9 % — SIGNIFICANT CHANGE UP (ref 1.7–9.3)
NEUTROPHILS # BLD AUTO: 7.8 K/UL — HIGH (ref 1.4–6.5)
NEUTROPHILS NFR BLD AUTO: 60.8 % — SIGNIFICANT CHANGE UP (ref 42.2–75.2)
NRBC # BLD: 0 /100 WBCS — SIGNIFICANT CHANGE UP (ref 0–0)
PLATELET # BLD AUTO: 200 K/UL — SIGNIFICANT CHANGE UP (ref 130–400)
POTASSIUM SERPL-MCNC: 4.4 MMOL/L — SIGNIFICANT CHANGE UP (ref 3.5–5)
POTASSIUM SERPL-SCNC: 4.4 MMOL/L — SIGNIFICANT CHANGE UP (ref 3.5–5)
PROT SERPL-MCNC: 5.7 G/DL — LOW (ref 6–8)
RBC # BLD: 4.6 M/UL — LOW (ref 4.7–6.1)
RBC # FLD: 13.8 % — SIGNIFICANT CHANGE UP (ref 11.5–14.5)
SODIUM SERPL-SCNC: 137 MMOL/L — SIGNIFICANT CHANGE UP (ref 135–146)
TOTAL CHOLESTEROL/HDL RATIO MEASUREMENT: 2.1 RATIO — LOW (ref 4–5.5)
TRIGL SERPL-MCNC: 90 MG/DL — SIGNIFICANT CHANGE UP (ref 10–149)
WBC # BLD: 12.83 K/UL — HIGH (ref 4.8–10.8)
WBC # FLD AUTO: 12.83 K/UL — HIGH (ref 4.8–10.8)

## 2020-03-02 PROCEDURE — 99233 SBSQ HOSP IP/OBS HIGH 50: CPT

## 2020-03-02 PROCEDURE — 99232 SBSQ HOSP IP/OBS MODERATE 35: CPT | Mod: 57

## 2020-03-02 RX ORDER — ATORVASTATIN CALCIUM 80 MG/1
80 TABLET, FILM COATED ORAL AT BEDTIME
Refills: 0 | Status: DISCONTINUED | OUTPATIENT
Start: 2020-03-02 | End: 2020-03-04

## 2020-03-02 RX ADMIN — LISINOPRIL 10 MILLIGRAM(S): 2.5 TABLET ORAL at 05:05

## 2020-03-02 RX ADMIN — SENNA PLUS 2 TABLET(S): 8.6 TABLET ORAL at 21:50

## 2020-03-02 RX ADMIN — ENOXAPARIN SODIUM 40 MILLIGRAM(S): 100 INJECTION SUBCUTANEOUS at 21:50

## 2020-03-02 RX ADMIN — PANTOPRAZOLE SODIUM 40 MILLIGRAM(S): 20 TABLET, DELAYED RELEASE ORAL at 08:38

## 2020-03-02 RX ADMIN — ATORVASTATIN CALCIUM 80 MILLIGRAM(S): 80 TABLET, FILM COATED ORAL at 21:50

## 2020-03-02 RX ADMIN — Medication 81 MILLIGRAM(S): at 12:49

## 2020-03-02 NOTE — CHART NOTE - NSCHARTNOTEFT_GEN_A_CORE
Findings discussed with patient and family.  He clearly does not want undergo surgery if possible and wishes to be evaluated for less invasive percutaneous therapies (mitral clip).  Final plan to be determined after cardiac cath tomorrow.  NPO after midnight.

## 2020-03-02 NOTE — PROGRESS NOTE ADULT - SUBJECTIVE AND OBJECTIVE BOX
AWADALLA, FAWZY  84y  Male      Patient is a 84y old  Male who presents with a chief complaint of Palpitations, chest pain (01 Mar 2020 11:08)      INTERVAL HPI/OVERNIGHT EVENTS:  He feels better, no chest pain or SOB.   Vital Signs Last 24 Hrs  T(C): 36.2 (02 Mar 2020 15:46), Max: 36.5 (02 Mar 2020 01:20)  T(F): 97.1 (02 Mar 2020 15:46), Max: 97.7 (02 Mar 2020 01:20)  HR: 83 (02 Mar 2020 15:46) (78 - 90)  BP: 127/60 (02 Mar 2020 15:46) (127/60 - 139/69)  BP(mean): --  RR: 18 (02 Mar 2020 15:46) (18 - 18)  SpO2: 98% (02 Mar 2020 15:46) (96% - 99%)            Consultant(s) Notes Reviewed:  [x ] YES  [ ] NO          MEDICATIONS  (STANDING):  aspirin  chewable 81 milliGRAM(s) Oral daily  atorvastatin 40 milliGRAM(s) Oral at bedtime  chlorhexidine 4% Liquid 1 Application(s) Topical <User Schedule>  enoxaparin Injectable 40 milliGRAM(s) SubCutaneous at bedtime  influenza   Vaccine 0.5 milliLiter(s) IntraMuscular once  lisinopril 10 milliGRAM(s) Oral daily  pantoprazole    Tablet 40 milliGRAM(s) Oral before breakfast  senna 2 Tablet(s) Oral at bedtime    MEDICATIONS  (PRN):      LABS                          12.4   12.83 )-----------( 200      ( 02 Mar 2020 05:37 )             39.4     03-02    137  |  101  |  11  ----------------------------<  101<H>  4.4   |  27  |  1.1    Ca    8.5      02 Mar 2020 05:37  Mg     2.1     03-02    TPro  5.7<L>  /  Alb  3.6  /  TBili  0.5  /  DBili  x   /  AST  15  /  ALT  12  /  AlkPhos  45  03-02          Lactate Trend    CARDIAC MARKERS ( 01 Mar 2020 16:07 )  x     / <0.01 ng/mL / x     / x     / x      CARDIAC MARKERS ( 01 Mar 2020 11:14 )  x     / <0.01 ng/mL / x     / x     / x      CARDIAC MARKERS ( 01 Mar 2020 00:35 )  x     / <0.01 ng/mL / x     / x     / x          CAPILLARY BLOOD GLUCOSE            RADIOLOGY & ADDITIONAL TESTS:    Imaging Personally Reviewed:  [ ] YES  [ ] NO    HEALTH ISSUES - PROBLEM Dx:        PHYSICAL EXAM:  GENERAL: NAD, well-developed  HEAD:  Atraumatic, Normocephalic  EYES: EOMI, PERRLA, conjunctiva and sclera clear  NECK: Supple, No JVD  CHEST/LUNG: Clear to auscultation bilaterally; No wheeze  HEART: Regular rate and rhythm; Systolic murmur on apex 2/6  ABDOMEN: Soft, Nontender, Nondistended; Bowel sounds present  EXTREMITIES:  2+ Peripheral Pulses, No clubbing, cyanosis, or edema  PSYCH: AAOx3  NEUROLOGY: non-focal  SKIN: No rashes or lesions

## 2020-03-02 NOTE — PROGRESS NOTE ADULT - ASSESSMENT
85yo M with PMH of HTN, HLD comes to the ED with chief c/o chest pain and palpitations.     Atypical chest pain: improved  EKG showed no acute ischemic changes, troponin x 3 negative.   ACS ruled out, likely muscular pain, patient has bilateral pain.   Plan for cardiac cath tomorrow   Continue ASA and Lipitor.     Severe Mitral regurgitation  s/p YOVANA today.   Patient doesn't want surgery , cardiology recommended mitral clip.   Plan for cardiac cath tomorrow, then possible transfer to [facility] for Mitral clip.     HTN: controlled, continue lisinopril      #Progress Note Handoff:  Pending (specify):  Cardiac cath tomorrow.   Family discussion:  Disposition: Home vs transfer to [facility] as above.

## 2020-03-03 LAB
ANION GAP SERPL CALC-SCNC: 12 MMOL/L — SIGNIFICANT CHANGE UP (ref 7–14)
APTT BLD: 31.8 SEC — SIGNIFICANT CHANGE UP (ref 27–39.2)
BUN SERPL-MCNC: 10 MG/DL — SIGNIFICANT CHANGE UP (ref 10–20)
CALCIUM SERPL-MCNC: 8.8 MG/DL — SIGNIFICANT CHANGE UP (ref 8.5–10.1)
CHLORIDE SERPL-SCNC: 98 MMOL/L — SIGNIFICANT CHANGE UP (ref 98–110)
CO2 SERPL-SCNC: 27 MMOL/L — SIGNIFICANT CHANGE UP (ref 17–32)
CREAT SERPL-MCNC: 1 MG/DL — SIGNIFICANT CHANGE UP (ref 0.7–1.5)
GLUCOSE SERPL-MCNC: 99 MG/DL — SIGNIFICANT CHANGE UP (ref 70–99)
HCT VFR BLD CALC: 40.4 % — LOW (ref 42–52)
HGB BLD-MCNC: 13.3 G/DL — LOW (ref 14–18)
INR BLD: 0.99 RATIO — SIGNIFICANT CHANGE UP (ref 0.65–1.3)
MAGNESIUM SERPL-MCNC: 2.1 MG/DL — SIGNIFICANT CHANGE UP (ref 1.8–2.4)
MCHC RBC-ENTMCNC: 28.4 PG — SIGNIFICANT CHANGE UP (ref 27–31)
MCHC RBC-ENTMCNC: 32.9 G/DL — SIGNIFICANT CHANGE UP (ref 32–37)
MCV RBC AUTO: 86.1 FL — SIGNIFICANT CHANGE UP (ref 80–94)
NRBC # BLD: 0 /100 WBCS — SIGNIFICANT CHANGE UP (ref 0–0)
PHOSPHATE SERPL-MCNC: 4.4 MG/DL — SIGNIFICANT CHANGE UP (ref 2.1–4.9)
PLATELET # BLD AUTO: 199 K/UL — SIGNIFICANT CHANGE UP (ref 130–400)
POTASSIUM SERPL-MCNC: 4.3 MMOL/L — SIGNIFICANT CHANGE UP (ref 3.5–5)
POTASSIUM SERPL-SCNC: 4.3 MMOL/L — SIGNIFICANT CHANGE UP (ref 3.5–5)
PROTHROM AB SERPL-ACNC: 11.4 SEC — SIGNIFICANT CHANGE UP (ref 9.95–12.87)
RBC # BLD: 4.69 M/UL — LOW (ref 4.7–6.1)
RBC # FLD: 13.7 % — SIGNIFICANT CHANGE UP (ref 11.5–14.5)
SODIUM SERPL-SCNC: 137 MMOL/L — SIGNIFICANT CHANGE UP (ref 135–146)
TSH SERPL-MCNC: 1.6 UIU/ML — SIGNIFICANT CHANGE UP (ref 0.27–4.2)
WBC # BLD: 12.86 K/UL — HIGH (ref 4.8–10.8)
WBC # FLD AUTO: 12.86 K/UL — HIGH (ref 4.8–10.8)

## 2020-03-03 PROCEDURE — 93010 ELECTROCARDIOGRAM REPORT: CPT

## 2020-03-03 PROCEDURE — 93460 R&L HRT ART/VENTRICLE ANGIO: CPT | Mod: 26

## 2020-03-03 PROCEDURE — 99232 SBSQ HOSP IP/OBS MODERATE 35: CPT

## 2020-03-03 PROCEDURE — 71045 X-RAY EXAM CHEST 1 VIEW: CPT | Mod: 26

## 2020-03-03 RX ORDER — SODIUM CHLORIDE 9 MG/ML
450 INJECTION INTRAMUSCULAR; INTRAVENOUS; SUBCUTANEOUS
Refills: 0 | Status: DISCONTINUED | OUTPATIENT
Start: 2020-03-03 | End: 2020-03-04

## 2020-03-03 RX ORDER — SODIUM CHLORIDE 9 MG/ML
1000 INJECTION INTRAMUSCULAR; INTRAVENOUS; SUBCUTANEOUS
Refills: 0 | Status: DISCONTINUED | OUTPATIENT
Start: 2020-03-03 | End: 2020-03-04

## 2020-03-03 RX ADMIN — Medication 81 MILLIGRAM(S): at 12:01

## 2020-03-03 RX ADMIN — SODIUM CHLORIDE 75 MILLILITER(S): 9 INJECTION INTRAMUSCULAR; INTRAVENOUS; SUBCUTANEOUS at 17:48

## 2020-03-03 RX ADMIN — PANTOPRAZOLE SODIUM 40 MILLIGRAM(S): 20 TABLET, DELAYED RELEASE ORAL at 05:31

## 2020-03-03 RX ADMIN — SENNA PLUS 2 TABLET(S): 8.6 TABLET ORAL at 22:55

## 2020-03-03 RX ADMIN — ATORVASTATIN CALCIUM 80 MILLIGRAM(S): 80 TABLET, FILM COATED ORAL at 22:55

## 2020-03-03 RX ADMIN — LISINOPRIL 10 MILLIGRAM(S): 2.5 TABLET ORAL at 05:31

## 2020-03-03 RX ADMIN — ENOXAPARIN SODIUM 40 MILLIGRAM(S): 100 INJECTION SUBCUTANEOUS at 22:55

## 2020-03-03 NOTE — DISCHARGE NOTE PROVIDER - HOSPITAL COURSE
83yo M with PMH of HTN, HLD comes to the ED with chief c/o chest pain and palpitations.         Atypical chest pain: improved    EKG showed no acute ischemic changes, troponin x 3 negative.     ACS ruled out, likely muscular pain, patient has bilateral pain.     cardiac cath showed            Severe Mitral regurgitation    s/p YOVANA 2/3.     Patient doesn't want surgery , cardiology recommended mitral clip.     Plan for cardiac cath today , then possible transfer to [facility] for Mitral clip. 83yo M with PMH of HTN, HLD comes to the ED with chief c/o chest pain and palpitations.         Atypical chest pain, with sever mitral regurgitation     EKG showed no acute ischemic changes, troponin x 3 negative.     ACS ruled out, likely muscular pain, patient has bilateral pain.     cardiac cath showed  1 vessel CAD  ( distal LAD is a ) , with severe MR , and mild pulmonary HTN    YOVANA done     to be transferred to [facility] for mitral clip        Vital Signs Last 24 Hrs    T(C): 36.2 (04 Mar 2020 05:32), Max: 36.2 (04 Mar 2020 05:32)    T(F): 97.2 (04 Mar 2020 05:32), Max: 97.2 (04 Mar 2020 05:32)    HR: 81 (04 Mar 2020 05:32) (81 - 98), sinus tachy cardia this AM    BP: 112/58 (04 Mar 2020 05:32) (112/58 - 138/65)    BP(mean): --    RR: 16 (04 Mar 2020 05:32) (16 - 16)    SpO2: 95% (03 Mar 2020 21:00) (95% - 95%)            GENERAL: NAD, well-developed    HEAD:  Atraumatic, Normocephalic    EYES: EOMI, PERRLA, conjunctiva and sclera clear    NECK: Supple, No JVD    CHEST/LUNG: Clear to auscultation bilaterally; No wheeze    HEART: Regular rate and rhythm; Systolic murmur on apex 3/6    ABDOMEN: Soft, Nontender, Nondistended; Bowel sounds present    EXTREMITIES:  2+ Peripheral Pulses, No clubbing, cyanosis, or edema, radial entry site no hematoma, no redness     PSYCH: AAOx3    NEUROLOGY: non-focal

## 2020-03-03 NOTE — PROGRESS NOTE ADULT - SUBJECTIVE AND OBJECTIVE BOX
HPI:  Pt is a 83yo M with PMH of HTN, HLD comes to the ED with chief c/o chest pain and palpitations.   HPI obtained from pt and son at bedside. Yesterday, pt started to have sudden onset palpitations, when he was sitting, after his dinner. Son states that he has had palpitations in the past- but not this severe. Episodes are usually intermittent and  lasted for about 1 hour- before self resolving yesterday. Pt also was complaining of simultaneous chest pain- L sided, non radiating, needle like 7/ 10, not a/w SOB, cough.   Pt recently saw dr Lira in the outpt setting- and was being planned for cardiac cath in march for episodes of exertional chest pain that he has been having.   Denies any headache fever, chills, Abdominal pain, diarrhea, constipation.   VS on arrival- noted to be stable. Labs unremarkable. EKG with NSR on admission.  Received ASA 325in ED. (01 Mar 2020 08:55)      T(C): 35.9 (03-03-20 @ 05:37), Max: 36.6 (03-02-20 @ 20:00)  HR: 75 (03-03-20 @ 05:37) (75 - 92)  BP: 125/67 (03-03-20 @ 05:37) (125/67 - 141/72)  RR: 18 (03-03-20 @ 05:37) (18 - 18)  SpO2: --    MOTOR EXAM NOTASSESSED      Physical Medicine And Rehabilitation Services are not indicated in this patient for the following Reason(s):    [    ] Patient is medically unstable      [    ]  Patient does not have appropriate activity orders      [     ] Patient has no weight bearing status for:      [ x    ] Patient is independently ambulating      [     ]  Patient is from Skilled Nursing Facility and is appropriate to return      [     ] Patient was non-ambulatory prior to admission      [     ]  Other      WILL CANCEL PM&R / PT request

## 2020-03-03 NOTE — DISCHARGE NOTE PROVIDER - NSDCFUSCHEDAPPT_GEN_ALL_CORE_FT
AWADALLA, FAWZY ; 03/06/2020 ; [facility] FAWZY ; 03/16/2020 ; [facility] FAWZ[facility] ; 03/16/2020 ; NP Gen Surg [facility] [address] AWADALLA, FAWZY ; 03/24/2020 ; NPP Gen Surg [address] AWADALLA, FAWZY ; 03/06/2020 ; [facility] FAWZY ; 03/16/2020 ; [facility] AWADALLA, FAWZY ; 03/16/2020 ; NP Gen Surg [facility] [address] AWADALLA, FAWZY ; 03/24/2020 ; NPP Gen Surg [address] [place]

## 2020-03-03 NOTE — DISCHARGE NOTE PROVIDER - PROVIDER TOKENS
PROVIDER:[TOKEN:[[id number]],FOLLOWUP:[Routine]],PROVIDER:[TOKEN:[[id number]],FOLLOWUP:[Routine]]

## 2020-03-03 NOTE — PROGRESS NOTE ADULT - SUBJECTIVE AND OBJECTIVE BOX
AWADALLJERRISAMANTHA  84y  Male      Patient is a 84y old  Male who presents with a chief complaint of Palpitations, chest pain (02 Mar 2020 15:36)      INTERVAL HPI/OVERNIGHT EVENTS:  no acute events overnight, denied any compalins on ROs    Vital Signs Last 24 Hrs  T(C): 35.9 (03 Mar 2020 05:37), Max: 36.6 (02 Mar 2020 20:00)  T(F): 96.6 (03 Mar 2020 05:37), Max: 97.9 (02 Mar 2020 20:00)  HR: 75 (03 Mar 2020 05:37) (75 - 92)  BP: 125/67 (03 Mar 2020 05:37) (125/67 - 141/72)  BP(mean): --  RR: 18 (03 Mar 2020 05:37) (18 - 18)  SpO2: 98% (02 Mar 2020 15:46) (98% - 98%)    PHYSICAL EXAM:  GENERAL: NAD, well-developed  HEAD:  Atraumatic, Normocephalic  EYES: EOMI, PERRLA, conjunctiva and sclera clear  NECK: Supple, No JVD  CHEST/LUNG: Clear to auscultation bilaterally; No wheeze  HEART: Regular rate and rhythm; Systolic murmur on apex 2/6  ABDOMEN: Soft, Nontender, Nondistended; Bowel sounds present  EXTREMITIES:  2+ Peripheral Pulses, No clubbing, cyanosis, or edema  PSYCH: AAOx3  NEUROLOGY: non-focal  SKIN: No rashes or lesions      LABS:                        13.3   12.86 )-----------( 199      ( 03 Mar 2020 05:25 )             40.4     03-03    137  |  98  |  10  ----------------------------<  99  4.3   |  27  |  1.0    Ca    8.8      03 Mar 2020 05:25  Phos  4.4     03-03  Mg     2.1     03-03    TPro  5.7<L>  /  Alb  3.6  /  TBili  0.5  /  DBili  x   /  AST  15  /  ALT  12  /  AlkPhos  45  03-02    PT/INR - ( 03 Mar 2020 05:25 )   PT: 11.40 sec;   INR: 0.99 ratio         PTT - ( 03 Mar 2020 05:25 )  PTT:31.8 sec      CAPILLARY BLOOD GLUCOSE          RADIOLOGY & ADDITIONAL TESTS:    Imaging Personally Reviewed:  [ ] YES  [ ] NO

## 2020-03-03 NOTE — PROGRESS NOTE ADULT - ASSESSMENT
85yo M with PMH of HTN, HLD comes to the ED with chief c/o chest pain and palpitations.     Atypical chest pain: improved  EKG showed no acute ischemic changes, troponin x 3 negative.   ACS ruled out, likely muscular pain, patient has bilateral pain.   Plan for cardiac cath todau  Continue ASA and Lipitor.     Severe Mitral regurgitation  s/p YOVANA 2/3.   Patient doesn't want surgery , cardiology recommended mitral clip.   Plan for cardiac cath today , then possible transfer to [facility] for Mitral clip.     HTN: controlled, continue lisinopril    ) h/o HLD  - c/w statin  - check hba1c, lipid profile    #)h/o GERD?  - c/w protonix      #) diet- dash  #) dvt ppx- lovenox  #) gi ppx- protonix  #) ambulate as tolerated  #) dispo- post cardiac eval 85yo M with PMH of HTN, HLD comes to the ED with chief c/o chest pain and palpitations.     Atypical chest pain: improved  EKG showed no acute ischemic changes, troponin x 3 negative.   ACS ruled out, likely muscular pain, patient has bilateral pain.   Plan for cardiac cath today  Continue ASA and Lipitor.     Severe Mitral regurgitation  s/p YOVANA 2/3.   Patient doesn't want surgery , cardiology recommended mitral clip.   Plan for cardiac cath today , then possible transfer to [facility] for Mitral clip.     HTN: controlled, continue lisinopril    h/o HLD  - c/w statin    h/o GERD  - c/w protonix      #) diet- dash  #) dvt ppx- lovenox  #) gi ppx- protonix  #) ambulate as tolerated  #) dispo- post cardiac eval

## 2020-03-03 NOTE — CHART NOTE - NSCHARTNOTEFT_GEN_A_CORE
PRE-OP DIAGNOSIS: suspected CAD, severe MR    PROCEDURE: RHC/LHC with coronary angiography    Physician: Dr Lira  Assistant: Greg Vaca    ANESTHESIA TYPE:  [  ]General Anesthesia  [ x ] Sedation  [  ] Local/Regional    ESTIMATED BLOOD LOSS:    10   mL    CONDITION  [  ] Critical  [  ] Serious  [  ]Fair  [  x]Good    IV CONTRAST:  125      mL    FINDINGS  Left Heart Catheterization:  LVEF%: 60%  LVEDP: nl  [ ] Normal Coronary Arteries  [ ] Luminal Irregularities  [ ] Non-obstructive CAD    ACCESS:    [ x] right radial artery and brachial vein  [ ] right femoral artery    LEFT HEART CATHETERIZATION                                    Left main: no disease  LAD: mild atheroscelrosis in mid segment, distal segment in a  filled by bridding collaterals  Left Circumflex: no disease  Right Coronary Artery: no disease    RIGHT HEART CATHETERIZATION  PA:33/14/21  PCW: 9    POST-OP DIAGNOSIS   1 vessel CAD  ( distal LAD is a ) , with severe MR , and mild pulmonary HTN        PLAN OF CARE  [ ] D/C Home today  [ ]  D/C in AM  [ x] Return to In-patient bed  [ ] Admit for observation  [ ] Return for staged procedure:  [ ] CT Surgery consult called  [ ]  Continue DAPT, B-blocker & Statin therapy

## 2020-03-03 NOTE — DISCHARGE NOTE PROVIDER - CARE PROVIDER_API CALL
Jonathan Lira)  Cardiovascular Disease; Internal Medicine; Interventional Cardiology  [address] [place] [address]  Phone: [phone]  Fax: [phone]  Follow Up Time: Routine    Favian Bustamante)  Cardiovascular Disease; Internal Medicine  [address] [place] [address]  Phone: [phone]  Fax: [phone]  Follow Up Time: Routine

## 2020-03-03 NOTE — PROGRESS NOTE ADULT - ATTENDING COMMENTS
#Progress Note Handoff  Pending (specify):  [facility] today   Family discussion: tonia pt [facility] today   Disposition: Home vs OSH

## 2020-03-03 NOTE — DISCHARGE NOTE PROVIDER - NSDCCPCAREPLAN_GEN_ALL_CORE_FT
PRINCIPAL DISCHARGE DIAGNOSIS  Diagnosis: Mitral regurgitation  Assessment and Plan of Treatment:       SECONDARY DISCHARGE DIAGNOSES  Diagnosis: Chest pain  Assessment and Plan of Treatment: PRINCIPAL DISCHARGE DIAGNOSIS  Diagnosis: Mitral regurgitation  Assessment and Plan of Treatment: you will be transfered to [facility] for mitral clip

## 2020-03-03 NOTE — DISCHARGE NOTE PROVIDER - NSDCMRMEDTOKEN_GEN_ALL_CORE_FT
atorvastatin 40 mg oral tablet: 1 tab(s) orally once a day  lisinopril 10 mg oral tablet: 1 tab(s) orally once a day  pantoprazole 40 mg oral delayed release tablet: 1 tab(s) orally once a day  Senna 8.6 mg oral tablet: 1 tab(s) orally once a day (at bedtime) aspirin 81 mg oral tablet, chewable: 1 tab(s) orally once a day  atorvastatin 40 mg oral tablet: 1 tab(s) orally once a day  lisinopril 10 mg oral tablet: 1 tab(s) orally once a day  pantoprazole 40 mg oral delayed release tablet: 1 tab(s) orally once a day  Senna 8.6 mg oral tablet: 1 tab(s) orally once a day (at bedtime)

## 2020-03-03 NOTE — PHYSICAL THERAPY INITIAL EVALUATION ADULT - SPECIFY REASON(S)
Pt is unavailable for PT initial evaluation despite 2 attempt made. RN stated pt went to cath lab. PT will f/u tomorrow,

## 2020-03-04 ENCOUNTER — TRANSCRIPTION ENCOUNTER (OUTPATIENT)
Age: 85
End: 2020-03-04

## 2020-03-04 ENCOUNTER — INPATIENT (INPATIENT)
Facility: HOSPITAL | Age: 85
LOS: 5 days | Discharge: HOME HEALTH | DRG: 266 | End: 2020-03-10
Attending: INTERNAL MEDICINE | Admitting: INTERNAL MEDICINE
Payer: MEDICARE

## 2020-03-04 VITALS
DIASTOLIC BLOOD PRESSURE: 65 MMHG | HEART RATE: 108 BPM | SYSTOLIC BLOOD PRESSURE: 116 MMHG | OXYGEN SATURATION: 96 % | RESPIRATION RATE: 17 BRPM

## 2020-03-04 VITALS
SYSTOLIC BLOOD PRESSURE: 109 MMHG | TEMPERATURE: 97 F | DIASTOLIC BLOOD PRESSURE: 55 MMHG | HEART RATE: 91 BPM | RESPIRATION RATE: 18 BRPM

## 2020-03-04 DIAGNOSIS — Z98.890 OTHER SPECIFIED POSTPROCEDURAL STATES: Chronic | ICD-10-CM

## 2020-03-04 LAB
ALBUMIN SERPL ELPH-MCNC: 3.5 G/DL — SIGNIFICANT CHANGE UP (ref 3.3–5)
ALP SERPL-CCNC: 56 U/L — SIGNIFICANT CHANGE UP (ref 40–120)
ALT FLD-CCNC: 14 U/L — SIGNIFICANT CHANGE UP (ref 10–45)
ANION GAP SERPL CALC-SCNC: 10 MMOL/L — SIGNIFICANT CHANGE UP (ref 5–17)
APPEARANCE UR: CLEAR — SIGNIFICANT CHANGE UP
APTT BLD: 31.2 SEC — SIGNIFICANT CHANGE UP (ref 27.5–36.3)
AST SERPL-CCNC: 15 U/L — SIGNIFICANT CHANGE UP (ref 10–40)
BASOPHILS # BLD AUTO: 0.07 K/UL — SIGNIFICANT CHANGE UP (ref 0–0.2)
BASOPHILS NFR BLD AUTO: 0.6 % — SIGNIFICANT CHANGE UP (ref 0–2)
BILIRUB SERPL-MCNC: 0.3 MG/DL — SIGNIFICANT CHANGE UP (ref 0.2–1.2)
BILIRUB UR-MCNC: NEGATIVE — SIGNIFICANT CHANGE UP
BLD GP AB SCN SERPL QL: NEGATIVE — SIGNIFICANT CHANGE UP
BUN SERPL-MCNC: 16 MG/DL — SIGNIFICANT CHANGE UP (ref 7–23)
CALCIUM SERPL-MCNC: 8.6 MG/DL — SIGNIFICANT CHANGE UP (ref 8.4–10.5)
CHLORIDE SERPL-SCNC: 103 MMOL/L — SIGNIFICANT CHANGE UP (ref 96–108)
CO2 SERPL-SCNC: 23 MMOL/L — SIGNIFICANT CHANGE UP (ref 22–31)
COLOR SPEC: YELLOW — SIGNIFICANT CHANGE UP
CREAT SERPL-MCNC: 1.19 MG/DL — SIGNIFICANT CHANGE UP (ref 0.5–1.3)
DIFF PNL FLD: NEGATIVE — SIGNIFICANT CHANGE UP
EOSINOPHIL # BLD AUTO: 0.28 K/UL — SIGNIFICANT CHANGE UP (ref 0–0.5)
EOSINOPHIL NFR BLD AUTO: 2.3 % — SIGNIFICANT CHANGE UP (ref 0–6)
GLUCOSE SERPL-MCNC: 116 MG/DL — HIGH (ref 70–99)
GLUCOSE UR QL: NEGATIVE — SIGNIFICANT CHANGE UP
HBA1C BLD-MCNC: 5.5 % — SIGNIFICANT CHANGE UP (ref 4–5.6)
HCT VFR BLD CALC: 44.5 % — SIGNIFICANT CHANGE UP (ref 39–50)
HGB BLD-MCNC: 13.8 G/DL — SIGNIFICANT CHANGE UP (ref 13–17)
IMM GRANULOCYTES NFR BLD AUTO: 0.8 % — SIGNIFICANT CHANGE UP (ref 0–1.5)
INR BLD: 0.95 — SIGNIFICANT CHANGE UP (ref 0.88–1.16)
KETONES UR-MCNC: NEGATIVE — SIGNIFICANT CHANGE UP
LEUKOCYTE ESTERASE UR-ACNC: NEGATIVE — SIGNIFICANT CHANGE UP
LYMPHOCYTES # BLD AUTO: 2.61 K/UL — SIGNIFICANT CHANGE UP (ref 1–3.3)
LYMPHOCYTES # BLD AUTO: 21.8 % — SIGNIFICANT CHANGE UP (ref 13–44)
MCHC RBC-ENTMCNC: 27.3 PG — SIGNIFICANT CHANGE UP (ref 27–34)
MCHC RBC-ENTMCNC: 31 GM/DL — LOW (ref 32–36)
MCV RBC AUTO: 87.9 FL — SIGNIFICANT CHANGE UP (ref 80–100)
MONOCYTES # BLD AUTO: 1.23 K/UL — HIGH (ref 0–0.9)
MONOCYTES NFR BLD AUTO: 10.3 % — SIGNIFICANT CHANGE UP (ref 2–14)
NEUTROPHILS # BLD AUTO: 7.69 K/UL — HIGH (ref 1.8–7.4)
NEUTROPHILS NFR BLD AUTO: 64.2 % — SIGNIFICANT CHANGE UP (ref 43–77)
NITRITE UR-MCNC: NEGATIVE — SIGNIFICANT CHANGE UP
NRBC # BLD: 0 /100 WBCS — SIGNIFICANT CHANGE UP (ref 0–0)
NT-PROBNP SERPL-SCNC: 81 PG/ML — SIGNIFICANT CHANGE UP (ref 0–300)
PH UR: 6 — SIGNIFICANT CHANGE UP (ref 5–8)
PLATELET # BLD AUTO: 206 K/UL — SIGNIFICANT CHANGE UP (ref 150–400)
POTASSIUM SERPL-MCNC: 4.4 MMOL/L — SIGNIFICANT CHANGE UP (ref 3.5–5.3)
POTASSIUM SERPL-SCNC: 4.4 MMOL/L — SIGNIFICANT CHANGE UP (ref 3.5–5.3)
PROT SERPL-MCNC: 6 G/DL — SIGNIFICANT CHANGE UP (ref 6–8.3)
PROT UR-MCNC: NEGATIVE MG/DL — SIGNIFICANT CHANGE UP
PROTHROM AB SERPL-ACNC: 10.8 SEC — SIGNIFICANT CHANGE UP (ref 10–12.9)
RBC # BLD: 5.06 M/UL — SIGNIFICANT CHANGE UP (ref 4.2–5.8)
RBC # FLD: 13.8 % — SIGNIFICANT CHANGE UP (ref 10.3–14.5)
RH IG SCN BLD-IMP: POSITIVE — SIGNIFICANT CHANGE UP
SODIUM SERPL-SCNC: 136 MMOL/L — SIGNIFICANT CHANGE UP (ref 135–145)
SP GR SPEC: 1.01 — SIGNIFICANT CHANGE UP (ref 1–1.03)
TSH SERPL-MCNC: 1.72 UIU/ML — SIGNIFICANT CHANGE UP (ref 0.35–4.94)
UROBILINOGEN FLD QL: 0.2 E.U./DL — SIGNIFICANT CHANGE UP
WBC # BLD: 11.97 K/UL — HIGH (ref 3.8–10.5)
WBC # FLD AUTO: 11.97 K/UL — HIGH (ref 3.8–10.5)

## 2020-03-04 PROCEDURE — 99239 HOSP IP/OBS DSCHRG MGMT >30: CPT

## 2020-03-04 PROCEDURE — 99232 SBSQ HOSP IP/OBS MODERATE 35: CPT

## 2020-03-04 PROCEDURE — 99223 1ST HOSP IP/OBS HIGH 75: CPT | Mod: 57

## 2020-03-04 RX ORDER — ASPIRIN/CALCIUM CARB/MAGNESIUM 324 MG
81 TABLET ORAL DAILY
Refills: 0 | Status: DISCONTINUED | OUTPATIENT
Start: 2020-03-04 | End: 2020-03-05

## 2020-03-04 RX ORDER — SODIUM CHLORIDE 9 MG/ML
3 INJECTION INTRAMUSCULAR; INTRAVENOUS; SUBCUTANEOUS EVERY 8 HOURS
Refills: 0 | Status: DISCONTINUED | OUTPATIENT
Start: 2020-03-04 | End: 2020-03-05

## 2020-03-04 RX ORDER — CHLORHEXIDINE GLUCONATE 213 G/1000ML
1 SOLUTION TOPICAL ONCE
Refills: 0 | Status: COMPLETED | OUTPATIENT
Start: 2020-03-05 | End: 2020-03-05

## 2020-03-04 RX ORDER — PANTOPRAZOLE SODIUM 20 MG/1
40 TABLET, DELAYED RELEASE ORAL
Refills: 0 | Status: DISCONTINUED | OUTPATIENT
Start: 2020-03-04 | End: 2020-03-05

## 2020-03-04 RX ORDER — ATORVASTATIN CALCIUM 80 MG/1
40 TABLET, FILM COATED ORAL AT BEDTIME
Refills: 0 | Status: DISCONTINUED | OUTPATIENT
Start: 2020-03-04 | End: 2020-03-10

## 2020-03-04 RX ORDER — CHLORHEXIDINE GLUCONATE 213 G/1000ML
1 SOLUTION TOPICAL ONCE
Refills: 0 | Status: COMPLETED | OUTPATIENT
Start: 2020-03-04 | End: 2020-03-04

## 2020-03-04 RX ORDER — CHLORHEXIDINE GLUCONATE 213 G/1000ML
10 SOLUTION TOPICAL ONCE
Refills: 0 | Status: COMPLETED | OUTPATIENT
Start: 2020-03-04 | End: 2020-03-05

## 2020-03-04 RX ORDER — HEPARIN SODIUM 5000 [USP'U]/ML
5000 INJECTION INTRAVENOUS; SUBCUTANEOUS EVERY 8 HOURS
Refills: 0 | Status: DISCONTINUED | OUTPATIENT
Start: 2020-03-04 | End: 2020-03-05

## 2020-03-04 RX ORDER — SENNA PLUS 8.6 MG/1
2 TABLET ORAL AT BEDTIME
Refills: 0 | Status: DISCONTINUED | OUTPATIENT
Start: 2020-03-04 | End: 2020-03-05

## 2020-03-04 RX ORDER — ASPIRIN/CALCIUM CARB/MAGNESIUM 324 MG
1 TABLET ORAL
Qty: 0 | Refills: 0 | DISCHARGE
Start: 2020-03-04

## 2020-03-04 RX ADMIN — PANTOPRAZOLE SODIUM 40 MILLIGRAM(S): 20 TABLET, DELAYED RELEASE ORAL at 06:09

## 2020-03-04 RX ADMIN — Medication 81 MILLIGRAM(S): at 11:55

## 2020-03-04 RX ADMIN — Medication 10 MILLIGRAM(S): at 21:55

## 2020-03-04 RX ADMIN — HEPARIN SODIUM 5000 UNIT(S): 5000 INJECTION INTRAVENOUS; SUBCUTANEOUS at 21:57

## 2020-03-04 RX ADMIN — Medication 81 MILLIGRAM(S): at 21:55

## 2020-03-04 RX ADMIN — ATORVASTATIN CALCIUM 40 MILLIGRAM(S): 80 TABLET, FILM COATED ORAL at 21:55

## 2020-03-04 RX ADMIN — LISINOPRIL 10 MILLIGRAM(S): 2.5 TABLET ORAL at 06:09

## 2020-03-04 RX ADMIN — CHLORHEXIDINE GLUCONATE 1 APPLICATION(S): 213 SOLUTION TOPICAL at 21:53

## 2020-03-04 RX ADMIN — SODIUM CHLORIDE 3 MILLILITER(S): 9 INJECTION INTRAMUSCULAR; INTRAVENOUS; SUBCUTANEOUS at 22:00

## 2020-03-04 NOTE — PHYSICAL THERAPY INITIAL EVALUATION ADULT - GENERAL OBSERVATIONS, REHAB EVAL
Chart reviewed. Pt seen semi-bunn in bed. In NAD /64. /min. Pts family at b/s. Pt agreeable to perform PT eval. + tele in place, + IV lock on. + call bell within reach.

## 2020-03-04 NOTE — PROGRESS NOTE ADULT - ASSESSMENT
85yo M with PMH of HTN, HLD comes to the ED with chief c/o chest pain and palpitations.     # Severe Mitral regurgitation  s/p YOVANA 2/3.   cardiology recommended mitral clip.    transfer to [facility] for Mitral clip.   s/p cardiac cath that showed 1 vs distal LAD lesion    HTN: controlled, continue lisinopril    h/o HLD  - c/w statin    h/o GERD  - c/w protonix      #) diet- dash  #) dvt ppx- lovenox  #) gi ppx- protonix  #) ambulate as tolerated  Transfer to [facility] under Dr Johnson.

## 2020-03-04 NOTE — H&P ADULT - ATTENDING COMMENTS
85 y/o male, resident of [place] and Bengali speaking, former smoker (30 pack years, quit 30 years ago) with PMHx of HTN and HLD who presented to the [place] ED on 3/1/2010 with c/o chest pain/palpitations that started while he was at home eating dinner, non-radiating and resolved with rest.  He's had palpitations in the past, but never this severe. Son does report progressive fatigue/activity limitation with significant SOB. TTE/YOVANA was performed revealed severe MR with flail posterior leaflet; cardiac catheterization showed occluded mid LAD filled via lt-lt collaterals with remaining nonobstructive disease. transferred for mitral valve therapy. exam remarkable for 3/6 HSM LSB, cta bl no wrr no cce. imp: severe degenerative MR; acute/chronic diastolic CHF NYHA II; high risk for MVR/CABG; htn; hld. discussed at length with CT surgery, IC, pt/family regarding options for CABG/MVR vs mitraclip with medical optimziation and consideration for PCI of  if refractory symptoms. understands risks/benefist/alternatives to procedures, informed consent obtained.  -preop for mitraclip  -CT surgical evaluation  -asa/plavix

## 2020-03-04 NOTE — PROGRESS NOTE ADULT - SUBJECTIVE AND OBJECTIVE BOX
SUBJECTIVE:    Patient is a 84y old Male who presents with a chief complaint of Palpitations, Chest pain (04 Mar 2020 11:43)    Currently admitted to medicine with the primary diagnosis of Mitral regurgitation     Today is hospital day 3d.     PAST MEDICAL & SURGICAL HISTORY  HLD (hyperlipidemia)  HTN (hypertension)  S/P hernia surgery    ALLERGIES:  penicillin (Unknown)    MEDICATIONS:  STANDING MEDICATIONS  aspirin  chewable 81 milliGRAM(s) Oral daily  atorvastatin 80 milliGRAM(s) Oral at bedtime  chlorhexidine 4% Liquid 1 Application(s) Topical <User Schedule>  enoxaparin Injectable 40 milliGRAM(s) SubCutaneous at bedtime  influenza   Vaccine 0.5 milliLiter(s) IntraMuscular once  lisinopril 10 milliGRAM(s) Oral daily  pantoprazole    Tablet 40 milliGRAM(s) Oral before breakfast  senna 2 Tablet(s) Oral at bedtime    PRN MEDICATIONS    VITALS:   T(F): 97.3  HR: 91  BP: 109/55  RR: 18  SpO2: 95%    LABS:                        13.3   12.86 )-----------( 199      ( 03 Mar 2020 05:25 )             40.4     03-03    137  |  98  |  10  ----------------------------<  99  4.3   |  27  |  1.0    Ca    8.8      03 Mar 2020 05:25  Phos  4.4     03-03  Mg     2.1     03-03      PT/INR - ( 03 Mar 2020 05:25 )   PT: 11.40 sec;   INR: 0.99 ratio         PTT - ( 03 Mar 2020 05:25 )  PTT:31.8 sec              RADIOLOGY:    PHYSICAL EXAM:  GEN: No acute distress  LUNGS: Clear to auscultation bilaterally   HEART: systolic murmur present in apex.  ABD/ GI: Soft, non-tender, non-distended. Bowel sounds present  EXT: NC/NC/NE/2+PP/VAUGHN  NEURO: AAOX3

## 2020-03-04 NOTE — H&P ADULT - ASSESSMENT
This is an 85 y/o male with PMHx of HTN, HLD, and known moderate to severe mitral regurgitation who presented to the [place] ED on 3/1/2010 with c/o chest pain and palpitations that started while he was at home eating dinner.  He's had palpitations in the past, but never this severe.  His symptoms this time were associated with chest pain so his family brought him to the ED.  Will pre-op for Mitraclip tomorrow with Dr. Canas.    Plan:    Problem 1.  Mitral regurgitation- Mitraclip tomorrow.  Consent to follow.  Pre-op blood/products ordered.  F/U T&S tonight and tomorrow am.  F/U admission labs (CBC, CMP, BNP, coags, lipid panel, TSH, HA1C).     Problem 2.  HTN.  Continue w/ ACEI    Problem 3.  HLD.  Continue w/ statin. This is an 85 y/o male with PMHx of HTN, HLD, and known moderate to severe mitral regurgitation who presented to the [place] ED on 3/1/2010 with c/o chest pain and palpitations that started while he was at home eating dinner.  He's had palpitations in the past, but never this severe.  His symptoms this time were associated with chest pain so his family brought him to the ED.  Will pre-op for Mitraclip tomorrow with Dr. Canas.    Plan:    Problem 1.  Mitral regurgitation- Mitraclip tomorrow.  Consent to follow.  Pre-op blood/products ordered.  F/U T&S tonight and tomorrow am.  F/U admission labs (CBC, CMP, BNP, coags, lipid panel, TSH, HA1C).     Problem 2.  HTN.  Continue w/ ACEI    Problem 3.  HLD.  Continue w/ statin.    Problem 4. Rash.  Appears psoriatic.  C/w Prednisone 10mg PO QD. This is an 85 y/o male with PMHx of HTN, HLD, and known moderate to severe mitral regurgitation who presented to the [place] ED on 3/1/2010 with c/o chest pain and palpitations that started while he was at home eating dinner.  He's had palpitations in the past, but never this severe.  His symptoms this time were associated with chest pain so his family brought him to the ED.  Will pre-op for Mitraclip tomorrow with Dr. Canas.    Plan:    Problem 1.  Mitral regurgitation- Mitraclip tomorrow.  Consent to follow.  Pre-op blood/products ordered.  F/U T&S tonight and tomorrow am.  F/U admission labs (CBC, CMP, BNP, coags, lipid panel, TSH, HA1C).     Problem 2.  HTN.  Continue w/ ACEI    Problem 3.  HLD.  Continue w/ statin.    Problem 4. Rash. C/w Prednisone 10mg PO QD.

## 2020-03-04 NOTE — PROGRESS NOTE ADULT - SUBJECTIVE AND OBJECTIVE BOX
CC:  Chest pain      MEDICATIONS  (STANDING):  aspirin  chewable 81 milliGRAM(s) Oral daily  atorvastatin 80 milliGRAM(s) Oral at bedtime  chlorhexidine 4% Liquid 1 Application(s) Topical <User Schedule>  enoxaparin Injectable 40 milliGRAM(s) SubCutaneous at bedtime  influenza   Vaccine 0.5 milliLiter(s) IntraMuscular once  lisinopril 10 milliGRAM(s) Oral daily  pantoprazole    Tablet 40 milliGRAM(s) Oral before breakfast  senna 2 Tablet(s) Oral at bedtime      ROS:  CV: No chest pain, shortness of breath or palpitations    PHYSICAL EXAM:  Vital Signs Last 24 Hrs  T(C): 36.2 (04 Mar 2020 05:32), Max: 36.2 (04 Mar 2020 05:32)  T(F): 97.2 (04 Mar 2020 05:32), Max: 97.2 (04 Mar 2020 05:32)  HR: 81 (04 Mar 2020 05:32) (81 - 98)  BP: 112/58 (04 Mar 2020 05:32) (112/58 - 138/65)  BP(mean): --  RR: 16 (04 Mar 2020 05:32) (16 - 16)  SpO2: 95% (03 Mar 2020 21:00) (95% - 95%)    General: NAD, AAOx3  HEENT: NCAT, EOMI  Neck: supple, no JVD  CV: RRR, S1S2 nl, 2/6 systolic  Lungs: CTA bilaterally, no wheeze, rales or rhonchi  Abdomen: soft, NT/ND, +BS  Extremities: ROM nl, no clubbing, cyanosis or edema  Neurologic: Nonfocal                            13.3   12.86 )-----------( 199      ( 03 Mar 2020 05:25 )             40.4         03-03    137  |  98  |  10  ----------------------------<  99  4.3   |  27  |  1.0    Ca    8.8      03 Mar 2020 05:25  Phos  4.4     03-03  Mg     2.1     03-03

## 2020-03-04 NOTE — PATIENT PROFILE ADULT - VISION (WITH CORRECTIVE LENSES IF THE PATIENT USUALLY WEARS THEM):
Detail Level: Detailed
Normal vision: sees adequately in most situations; can see medication labels, newsprint

## 2020-03-04 NOTE — PROGRESS NOTE ADULT - REASON FOR ADMISSION
Palpitations, chest pain

## 2020-03-04 NOTE — PROGRESS NOTE ADULT - ASSESSMENT
Flail P1 with Severe MR  1v CAD dLAD   Mild PHTN    Case discussed with patient, family and Dr. Canas    Plan:  Transfer to [facility] today for MitraClip

## 2020-03-04 NOTE — H&P ADULT - NSHPPHYSICALEXAM_GEN_ALL_CORE
GEN: NAD, looks comfortable  Psych: Mood appropriate  Neuro: A&Ox3.  No focal deficits.  Moving all extremities.   HEENT: No obvious abnormalities  CV: S1S2, regular, no murmurs appreciated.  No carotid bruits.  No JVD  Lungs: Clear B/L.  No wheezing, rales or rhonchi  ABD: Soft, non-tender, non-distended.  +Bowel sounds  EXT: Warm and well perfused.  No peripheral edema noted  Musculoskeletal: Moving all extremities with normal ROM, no joint swelling  PV: Pedal pulses palpable Appearance: No acute distress.  Neurologic: AAOx3, no AMS or focal deficits.  Responds appropriately to verbal and physical stimuli; exhibits purposeful movement in all extremities.  HEENT:   MMM, PERRLA, EOMI b/l  Neck: Supple, - JVD; - Carotid Bruit   Cardiovascular: RRR, S1/S2. +III/VI Systolic Murmur at [place], no r/g.  Respiratory: No acute respiratory distress on RA. CTA b/l, no w/r/r.   Gastrointestinal:  Soft, non-tender, non-distended, + BS.	  Skin: +Maculopapular rash on sacrum with plaque, no evidence of purulence or active bleeding. No ecchymoses. No cyanosis.  Extremities: Exhibits normal range of motion, no clubbing, cyanosis or edema.  Vascular: Peripheral pulses palpable 2+ bilaterally. Appearance: No acute distress.  Neurologic: AAOx3, no AMS or focal deficits.  Responds appropriately to verbal and physical stimuli; exhibits purposeful movement in all extremities.  HEENT:   MMM, PERRLA, EOMI b/l  Neck: Supple, - JVD; - Carotid Bruit   Cardiovascular: RRR, S1/S2. +III/VI Systolic Murmur at [place], no r/g.  Respiratory: No acute respiratory distress on RA. CTA b/l, no w/r/r.   Gastrointestinal:  Soft, non-tender, non-distended, + BS.	  Skin: +Maculopapular rash on right lateral thigh, no evidence of purulence or active bleeding. No ecchymoses. No cyanosis.  Extremities: Exhibits normal range of motion, no clubbing, cyanosis or edema.  Vascular: Peripheral pulses palpable 2+ bilaterally.

## 2020-03-04 NOTE — H&P ADULT - HISTORY OF PRESENT ILLNESS
This is an 85 y/o male with PMHx of HTN, HLD, and known moderate to severe mitral regurgitation who presented to the [place] ED on 3/1/2010 with c/o chest pain and palpitations that started while he was at home eating dinner.  He's had palpitations in the past, but never this severe.  His symptoms this time were associated with chest pain so his family brought him to the ED.  He had been planned for a cardiac cath in March this year (cardiologist Dr. Lira).  He currently denies... This is an 85 y/o male, resident of [place] and Portuguese speaking, former smoker (30 pack years, quit 30 years ago) with PMHx of HTN and HLD who presented to the [place] ED on 3/1/2010 with c/o chest pain and palpitations that started while he was at home eating dinner, non-radiating and resolved with rest.  He's had palpitations in the past, but never this severe.  His symptoms this time were associated with chest pain so his family brought him to the ED where VS stable and serial Troponin negative.  He is followed by Dr. Lira and has been planned for cardiac cath this month for recurrent episodes of stable angina and underwent cardiac cath during his admission which revealed 100% CTA distal LAD. ECHO was performed which revealed moderate to severe MR and he was referred to Dr. Canas for procedural evaluation, transferred to [facility] on 3/4/20 under his care.  On admission, patient without complaints.  Denies HA, AMS, CP, palpitations, SOB, cough, hemoptysis, n/v/d, fever. This is an 83 y/o male, resident of [place] and Tajik speaking, former smoker (30 pack years, quit 30 years ago) with PMHx of HTN and HLD who presented to the [place] ED on 3/1/2010 with c/o chest pain and palpitations that started while he was at home eating dinner, non-radiating and resolved with rest.  He's had palpitations in the past, but never this severe.  His symptoms this time were associated with chest pain so his family brought him to the ED where VS stable and serial Troponin negative.  He is followed by Dr. Lira and has been planned for cardiac cath this month for recurrent episodes of stable angina and underwent cardiac cath during his admission which revealed 100%  distal LAD. ECHO was performed which revealed moderate to severe MR and he was referred to Dr. Canas for procedural evaluation, transferred to [facility] on 3/4/20 under his care.  On admission, patient without complaints.  Denies HA, AMS, CP, palpitations, SOB, cough, hemoptysis, n/v/d, fever.

## 2020-03-04 NOTE — H&P ADULT - NSHPREVIEWOFSYSTEMS_GEN_ALL_CORE
Review of Systems  CONSTITUTIONAL:  Denies Fevers / chills, sweats, fatigue, weight loss, weight gain                                      NEURO:  Denies parethesias, seizures, syncope, confusion                                                                                EYES:  Denies Blurry vision, discharge, pain, loss of vision                                                                                    ENMT:  Denies Difficulty hearing, vertigo, dysphagia, epistaxis, recent dental work                                       CV:  +CP.  Denies palpitations, [place], orthopnea                                                                                          RESPIRATORY:  Denies Wheezing, SOB, cough / sputum, hemoptysis                                                                GI:  Denies Nausea, vomiting, diarrhea, constipation, melena, difficulty swallowing                                               : Denies Hematuria, dysuria, urgency, incontinence                                                                                         MUSCULOSKELETAL:  Denies arthritis, joint swelling, muscle weakness                                                             SKIN/BREAST:  Denies rash, itching, hair loss, masses                                                                                            PSYCH:  Denies depression, anxiety, suicidal ideation                                                                                               HEME/LYMPH:  Denies bruises easily, enlarged lymph nodes, tender lymph nodes                                        ENDOCRINE:  Denies cold intolerance, heat intolerance, polydipsia

## 2020-03-05 ENCOUNTER — APPOINTMENT (OUTPATIENT)
Dept: CARDIOTHORACIC SURGERY | Facility: HOSPITAL | Age: 85
End: 2020-03-05
Payer: MEDICARE

## 2020-03-05 LAB
ALBUMIN SERPL ELPH-MCNC: 2.9 G/DL — LOW (ref 3.3–5)
ALBUMIN SERPL ELPH-MCNC: 3.3 G/DL — SIGNIFICANT CHANGE UP (ref 3.3–5)
ALBUMIN SERPL ELPH-MCNC: 3.5 G/DL — SIGNIFICANT CHANGE UP (ref 3.3–5)
ALP SERPL-CCNC: 40 U/L — SIGNIFICANT CHANGE UP (ref 40–120)
ALP SERPL-CCNC: 45 U/L — SIGNIFICANT CHANGE UP (ref 40–120)
ALP SERPL-CCNC: 49 U/L — SIGNIFICANT CHANGE UP (ref 40–120)
ALT FLD-CCNC: 11 U/L — SIGNIFICANT CHANGE UP (ref 10–45)
ALT FLD-CCNC: 12 U/L — SIGNIFICANT CHANGE UP (ref 10–45)
ALT FLD-CCNC: 12 U/L — SIGNIFICANT CHANGE UP (ref 10–45)
ANION GAP SERPL CALC-SCNC: 10 MMOL/L — SIGNIFICANT CHANGE UP (ref 5–17)
ANION GAP SERPL CALC-SCNC: 12 MMOL/L — SIGNIFICANT CHANGE UP (ref 5–17)
ANION GAP SERPL CALC-SCNC: 12 MMOL/L — SIGNIFICANT CHANGE UP (ref 5–17)
APTT BLD: 27.9 SEC — SIGNIFICANT CHANGE UP (ref 27.5–36.3)
APTT BLD: 33.2 SEC — SIGNIFICANT CHANGE UP (ref 27.5–36.3)
AST SERPL-CCNC: 13 U/L — SIGNIFICANT CHANGE UP (ref 10–40)
AST SERPL-CCNC: 17 U/L — SIGNIFICANT CHANGE UP (ref 10–40)
AST SERPL-CCNC: 17 U/L — SIGNIFICANT CHANGE UP (ref 10–40)
BASOPHILS # BLD AUTO: 0.02 K/UL — SIGNIFICANT CHANGE UP (ref 0–0.2)
BASOPHILS # BLD AUTO: 0.04 K/UL — SIGNIFICANT CHANGE UP (ref 0–0.2)
BASOPHILS NFR BLD AUTO: 0.2 % — SIGNIFICANT CHANGE UP (ref 0–2)
BASOPHILS NFR BLD AUTO: 0.2 % — SIGNIFICANT CHANGE UP (ref 0–2)
BILIRUB SERPL-MCNC: 0.5 MG/DL — SIGNIFICANT CHANGE UP (ref 0.2–1.2)
BILIRUB SERPL-MCNC: 0.6 MG/DL — SIGNIFICANT CHANGE UP (ref 0.2–1.2)
BILIRUB SERPL-MCNC: 0.9 MG/DL — SIGNIFICANT CHANGE UP (ref 0.2–1.2)
BLD GP AB SCN SERPL QL: NEGATIVE — SIGNIFICANT CHANGE UP
BUN SERPL-MCNC: 17 MG/DL — SIGNIFICANT CHANGE UP (ref 7–23)
BUN SERPL-MCNC: 17 MG/DL — SIGNIFICANT CHANGE UP (ref 7–23)
BUN SERPL-MCNC: 18 MG/DL — SIGNIFICANT CHANGE UP (ref 7–23)
CALCIUM SERPL-MCNC: 7.9 MG/DL — LOW (ref 8.4–10.5)
CALCIUM SERPL-MCNC: 8.3 MG/DL — LOW (ref 8.4–10.5)
CALCIUM SERPL-MCNC: 8.5 MG/DL — SIGNIFICANT CHANGE UP (ref 8.4–10.5)
CHLORIDE SERPL-SCNC: 103 MMOL/L — SIGNIFICANT CHANGE UP (ref 96–108)
CHLORIDE SERPL-SCNC: 104 MMOL/L — SIGNIFICANT CHANGE UP (ref 96–108)
CHLORIDE SERPL-SCNC: 104 MMOL/L — SIGNIFICANT CHANGE UP (ref 96–108)
CO2 SERPL-SCNC: 20 MMOL/L — LOW (ref 22–31)
CO2 SERPL-SCNC: 21 MMOL/L — LOW (ref 22–31)
CO2 SERPL-SCNC: 23 MMOL/L — SIGNIFICANT CHANGE UP (ref 22–31)
CREAT SERPL-MCNC: 0.89 MG/DL — SIGNIFICANT CHANGE UP (ref 0.5–1.3)
CREAT SERPL-MCNC: 0.95 MG/DL — SIGNIFICANT CHANGE UP (ref 0.5–1.3)
CREAT SERPL-MCNC: 1.12 MG/DL — SIGNIFICANT CHANGE UP (ref 0.5–1.3)
EOSINOPHIL # BLD AUTO: 0.01 K/UL — SIGNIFICANT CHANGE UP (ref 0–0.5)
EOSINOPHIL # BLD AUTO: 0.19 K/UL — SIGNIFICANT CHANGE UP (ref 0–0.5)
EOSINOPHIL NFR BLD AUTO: 0.1 % — SIGNIFICANT CHANGE UP (ref 0–6)
EOSINOPHIL NFR BLD AUTO: 1.1 % — SIGNIFICANT CHANGE UP (ref 0–6)
ESTIMATED AVERAGE GLUCOSE: 108 MG/DL — SIGNIFICANT CHANGE UP (ref 68–114)
GAS PNL BLDA: SIGNIFICANT CHANGE UP
GLUCOSE SERPL-MCNC: 136 MG/DL — HIGH (ref 70–99)
GLUCOSE SERPL-MCNC: 142 MG/DL — HIGH (ref 70–99)
GLUCOSE SERPL-MCNC: 154 MG/DL — HIGH (ref 70–99)
HBA1C BLD-MCNC: 5.4 % — SIGNIFICANT CHANGE UP (ref 4–5.6)
HCT VFR BLD CALC: 35.8 % — LOW (ref 39–50)
HCT VFR BLD CALC: 36.4 % — LOW (ref 39–50)
HCT VFR BLD CALC: 41.1 % — SIGNIFICANT CHANGE UP (ref 39–50)
HGB BLD-MCNC: 11.1 G/DL — LOW (ref 13–17)
HGB BLD-MCNC: 11.5 G/DL — LOW (ref 13–17)
HGB BLD-MCNC: 13.2 G/DL — SIGNIFICANT CHANGE UP (ref 13–17)
IMM GRANULOCYTES NFR BLD AUTO: 0.8 % — SIGNIFICANT CHANGE UP (ref 0–1.5)
IMM GRANULOCYTES NFR BLD AUTO: 1.1 % — SIGNIFICANT CHANGE UP (ref 0–1.5)
INR BLD: 1.01 — SIGNIFICANT CHANGE UP (ref 0.88–1.16)
INR BLD: 1.07 — SIGNIFICANT CHANGE UP (ref 0.88–1.16)
LACTATE SERPL-SCNC: 1.3 MMOL/L — SIGNIFICANT CHANGE UP (ref 0.5–2)
LACTATE SERPL-SCNC: 1.3 MMOL/L — SIGNIFICANT CHANGE UP (ref 0.5–2)
LYMPHOCYTES # BLD AUTO: 1.81 K/UL — SIGNIFICANT CHANGE UP (ref 1–3.3)
LYMPHOCYTES # BLD AUTO: 14.5 % — SIGNIFICANT CHANGE UP (ref 13–44)
LYMPHOCYTES # BLD AUTO: 14.9 % — SIGNIFICANT CHANGE UP (ref 13–44)
LYMPHOCYTES # BLD AUTO: 2.62 K/UL — SIGNIFICANT CHANGE UP (ref 1–3.3)
MAGNESIUM SERPL-MCNC: 1.6 MG/DL — SIGNIFICANT CHANGE UP (ref 1.6–2.6)
MAGNESIUM SERPL-MCNC: 2.5 MG/DL — SIGNIFICANT CHANGE UP (ref 1.6–2.6)
MCHC RBC-ENTMCNC: 27.2 PG — SIGNIFICANT CHANGE UP (ref 27–34)
MCHC RBC-ENTMCNC: 27.3 PG — SIGNIFICANT CHANGE UP (ref 27–34)
MCHC RBC-ENTMCNC: 27.6 PG — SIGNIFICANT CHANGE UP (ref 27–34)
MCHC RBC-ENTMCNC: 31 GM/DL — LOW (ref 32–36)
MCHC RBC-ENTMCNC: 31.6 GM/DL — LOW (ref 32–36)
MCHC RBC-ENTMCNC: 32.1 GM/DL — SIGNIFICANT CHANGE UP (ref 32–36)
MCV RBC AUTO: 86 FL — SIGNIFICANT CHANGE UP (ref 80–100)
MCV RBC AUTO: 86.5 FL — SIGNIFICANT CHANGE UP (ref 80–100)
MCV RBC AUTO: 87.7 FL — SIGNIFICANT CHANGE UP (ref 80–100)
MONOCYTES # BLD AUTO: 0.58 K/UL — SIGNIFICANT CHANGE UP (ref 0–0.9)
MONOCYTES # BLD AUTO: 1.36 K/UL — HIGH (ref 0–0.9)
MONOCYTES NFR BLD AUTO: 4.8 % — SIGNIFICANT CHANGE UP (ref 2–14)
MONOCYTES NFR BLD AUTO: 7.6 % — SIGNIFICANT CHANGE UP (ref 2–14)
NEUTROPHILS # BLD AUTO: 13.61 K/UL — HIGH (ref 1.8–7.4)
NEUTROPHILS # BLD AUTO: 9.64 K/UL — HIGH (ref 1.8–7.4)
NEUTROPHILS NFR BLD AUTO: 75.5 % — SIGNIFICANT CHANGE UP (ref 43–77)
NEUTROPHILS NFR BLD AUTO: 79.2 % — HIGH (ref 43–77)
NRBC # BLD: 0 /100 WBCS — SIGNIFICANT CHANGE UP (ref 0–0)
PHOSPHATE SERPL-MCNC: 3.7 MG/DL — SIGNIFICANT CHANGE UP (ref 2.5–4.5)
PHOSPHATE SERPL-MCNC: 4.2 MG/DL — SIGNIFICANT CHANGE UP (ref 2.5–4.5)
PLATELET # BLD AUTO: 169 K/UL — SIGNIFICANT CHANGE UP (ref 150–400)
PLATELET # BLD AUTO: 185 K/UL — SIGNIFICANT CHANGE UP (ref 150–400)
PLATELET # BLD AUTO: 206 K/UL — SIGNIFICANT CHANGE UP (ref 150–400)
POTASSIUM SERPL-MCNC: 3.6 MMOL/L — SIGNIFICANT CHANGE UP (ref 3.5–5.3)
POTASSIUM SERPL-MCNC: 4.4 MMOL/L — SIGNIFICANT CHANGE UP (ref 3.5–5.3)
POTASSIUM SERPL-MCNC: 4.5 MMOL/L — SIGNIFICANT CHANGE UP (ref 3.5–5.3)
POTASSIUM SERPL-SCNC: 3.6 MMOL/L — SIGNIFICANT CHANGE UP (ref 3.5–5.3)
POTASSIUM SERPL-SCNC: 4.4 MMOL/L — SIGNIFICANT CHANGE UP (ref 3.5–5.3)
POTASSIUM SERPL-SCNC: 4.5 MMOL/L — SIGNIFICANT CHANGE UP (ref 3.5–5.3)
PROT SERPL-MCNC: 5.2 G/DL — LOW (ref 6–8.3)
PROT SERPL-MCNC: 5.6 G/DL — LOW (ref 6–8.3)
PROT SERPL-MCNC: 6.1 G/DL — SIGNIFICANT CHANGE UP (ref 6–8.3)
PROTHROM AB SERPL-ACNC: 11.5 SEC — SIGNIFICANT CHANGE UP (ref 10–12.9)
PROTHROM AB SERPL-ACNC: 12.2 SEC — SIGNIFICANT CHANGE UP (ref 10–12.9)
RBC # BLD: 4.08 M/UL — LOW (ref 4.2–5.8)
RBC # BLD: 4.21 M/UL — SIGNIFICANT CHANGE UP (ref 4.2–5.8)
RBC # BLD: 4.78 M/UL — SIGNIFICANT CHANGE UP (ref 4.2–5.8)
RBC # FLD: 13.4 % — SIGNIFICANT CHANGE UP (ref 10.3–14.5)
RBC # FLD: 13.5 % — SIGNIFICANT CHANGE UP (ref 10.3–14.5)
RBC # FLD: 13.6 % — SIGNIFICANT CHANGE UP (ref 10.3–14.5)
RH IG SCN BLD-IMP: POSITIVE — SIGNIFICANT CHANGE UP
SODIUM SERPL-SCNC: 135 MMOL/L — SIGNIFICANT CHANGE UP (ref 135–145)
SODIUM SERPL-SCNC: 137 MMOL/L — SIGNIFICANT CHANGE UP (ref 135–145)
SODIUM SERPL-SCNC: 137 MMOL/L — SIGNIFICANT CHANGE UP (ref 135–145)
WBC # BLD: 12.16 K/UL — HIGH (ref 3.8–10.5)
WBC # BLD: 18.01 K/UL — HIGH (ref 3.8–10.5)
WBC # BLD: 19.49 K/UL — HIGH (ref 3.8–10.5)
WBC # FLD AUTO: 12.16 K/UL — HIGH (ref 3.8–10.5)
WBC # FLD AUTO: 18.01 K/UL — HIGH (ref 3.8–10.5)
WBC # FLD AUTO: 19.49 K/UL — HIGH (ref 3.8–10.5)

## 2020-03-05 PROCEDURE — 99291 CRITICAL CARE FIRST HOUR: CPT

## 2020-03-05 PROCEDURE — 71045 X-RAY EXAM CHEST 1 VIEW: CPT | Mod: 26

## 2020-03-05 PROCEDURE — 93010 ELECTROCARDIOGRAM REPORT: CPT

## 2020-03-05 PROCEDURE — 33418 REPAIR TCAT MITRAL VALVE: CPT | Mod: 62,Q0

## 2020-03-05 PROCEDURE — 99292 CRITICAL CARE ADDL 30 MIN: CPT

## 2020-03-05 PROCEDURE — 93880 EXTRACRANIAL BILAT STUDY: CPT | Mod: 26

## 2020-03-05 RX ORDER — CHLORHEXIDINE GLUCONATE 213 G/1000ML
15 SOLUTION TOPICAL EVERY 12 HOURS
Refills: 0 | Status: DISCONTINUED | OUTPATIENT
Start: 2020-03-05 | End: 2020-03-06

## 2020-03-05 RX ORDER — DEXTROSE 50 % IN WATER 50 %
25 SYRINGE (ML) INTRAVENOUS ONCE
Refills: 0 | Status: DISCONTINUED | OUTPATIENT
Start: 2020-03-05 | End: 2020-03-07

## 2020-03-05 RX ORDER — GLUCAGON INJECTION, SOLUTION 0.5 MG/.1ML
1 INJECTION, SOLUTION SUBCUTANEOUS ONCE
Refills: 0 | Status: DISCONTINUED | OUTPATIENT
Start: 2020-03-05 | End: 2020-03-05

## 2020-03-05 RX ORDER — HEPARIN SODIUM 5000 [USP'U]/ML
5000 INJECTION INTRAVENOUS; SUBCUTANEOUS EVERY 8 HOURS
Refills: 0 | Status: DISCONTINUED | OUTPATIENT
Start: 2020-03-05 | End: 2020-03-05

## 2020-03-05 RX ORDER — SODIUM CHLORIDE 9 MG/ML
250 INJECTION INTRAMUSCULAR; INTRAVENOUS; SUBCUTANEOUS ONCE
Refills: 0 | Status: COMPLETED | OUTPATIENT
Start: 2020-03-05 | End: 2020-03-05

## 2020-03-05 RX ORDER — PANTOPRAZOLE SODIUM 20 MG/1
40 TABLET, DELAYED RELEASE ORAL DAILY
Refills: 0 | Status: DISCONTINUED | OUTPATIENT
Start: 2020-03-05 | End: 2020-03-05

## 2020-03-05 RX ORDER — FENTANYL CITRATE 50 UG/ML
25 INJECTION INTRAVENOUS ONCE
Refills: 0 | Status: DISCONTINUED | OUTPATIENT
Start: 2020-03-05 | End: 2020-03-05

## 2020-03-05 RX ORDER — DEXTROSE 50 % IN WATER 50 %
15 SYRINGE (ML) INTRAVENOUS ONCE
Refills: 0 | Status: DISCONTINUED | OUTPATIENT
Start: 2020-03-05 | End: 2020-03-05

## 2020-03-05 RX ORDER — ACETAMINOPHEN 500 MG
1000 TABLET ORAL ONCE
Refills: 0 | Status: COMPLETED | OUTPATIENT
Start: 2020-03-05 | End: 2020-03-05

## 2020-03-05 RX ORDER — SODIUM CHLORIDE 9 MG/ML
1000 INJECTION, SOLUTION INTRAVENOUS
Refills: 0 | Status: DISCONTINUED | OUTPATIENT
Start: 2020-03-05 | End: 2020-03-05

## 2020-03-05 RX ORDER — SODIUM CHLORIDE 9 MG/ML
1000 INJECTION INTRAMUSCULAR; INTRAVENOUS; SUBCUTANEOUS
Refills: 0 | Status: DISCONTINUED | OUTPATIENT
Start: 2020-03-05 | End: 2020-03-07

## 2020-03-05 RX ORDER — PHENYLEPHRINE HYDROCHLORIDE 10 MG/ML
0.1 INJECTION INTRAVENOUS
Qty: 40 | Refills: 0 | Status: DISCONTINUED | OUTPATIENT
Start: 2020-03-05 | End: 2020-03-06

## 2020-03-05 RX ORDER — ALBUMIN HUMAN 25 %
250 VIAL (ML) INTRAVENOUS
Refills: 0 | Status: COMPLETED | OUTPATIENT
Start: 2020-03-05 | End: 2020-03-06

## 2020-03-05 RX ORDER — VANCOMYCIN HCL 1 G
1000 VIAL (EA) INTRAVENOUS EVERY 12 HOURS
Refills: 0 | Status: DISCONTINUED | OUTPATIENT
Start: 2020-03-05 | End: 2020-03-05

## 2020-03-05 RX ORDER — CHLORHEXIDINE GLUCONATE 213 G/1000ML
15 SOLUTION TOPICAL EVERY 12 HOURS
Refills: 0 | Status: DISCONTINUED | OUTPATIENT
Start: 2020-03-05 | End: 2020-03-05

## 2020-03-05 RX ORDER — VANCOMYCIN HCL 1 G
1000 VIAL (EA) INTRAVENOUS EVERY 12 HOURS
Refills: 0 | Status: COMPLETED | OUTPATIENT
Start: 2020-03-05 | End: 2020-03-07

## 2020-03-05 RX ORDER — INSULIN LISPRO 100/ML
VIAL (ML) SUBCUTANEOUS
Refills: 0 | Status: DISCONTINUED | OUTPATIENT
Start: 2020-03-05 | End: 2020-03-05

## 2020-03-05 RX ORDER — CHLORHEXIDINE GLUCONATE 213 G/1000ML
1 SOLUTION TOPICAL DAILY
Refills: 0 | Status: DISCONTINUED | OUTPATIENT
Start: 2020-03-05 | End: 2020-03-09

## 2020-03-05 RX ORDER — SODIUM CHLORIDE 9 MG/ML
1000 INJECTION INTRAMUSCULAR; INTRAVENOUS; SUBCUTANEOUS
Refills: 0 | Status: DISCONTINUED | OUTPATIENT
Start: 2020-03-05 | End: 2020-03-05

## 2020-03-05 RX ORDER — GLUCAGON INJECTION, SOLUTION 0.5 MG/.1ML
1 INJECTION, SOLUTION SUBCUTANEOUS ONCE
Refills: 0 | Status: DISCONTINUED | OUTPATIENT
Start: 2020-03-05 | End: 2020-03-08

## 2020-03-05 RX ORDER — SODIUM BICARBONATE 1 MEQ/ML
25 SYRINGE (ML) INTRAVENOUS ONCE
Refills: 0 | Status: COMPLETED | OUTPATIENT
Start: 2020-03-05 | End: 2020-03-05

## 2020-03-05 RX ORDER — PROPOFOL 10 MG/ML
20 INJECTION, EMULSION INTRAVENOUS
Qty: 1000 | Refills: 0 | Status: DISCONTINUED | OUTPATIENT
Start: 2020-03-05 | End: 2020-03-06

## 2020-03-05 RX ORDER — INSULIN LISPRO 100/ML
VIAL (ML) SUBCUTANEOUS
Refills: 0 | Status: DISCONTINUED | OUTPATIENT
Start: 2020-03-05 | End: 2020-03-07

## 2020-03-05 RX ORDER — DEXTROSE 50 % IN WATER 50 %
12.5 SYRINGE (ML) INTRAVENOUS ONCE
Refills: 0 | Status: DISCONTINUED | OUTPATIENT
Start: 2020-03-05 | End: 2020-03-07

## 2020-03-05 RX ORDER — DEXTROSE 50 % IN WATER 50 %
12.5 SYRINGE (ML) INTRAVENOUS ONCE
Refills: 0 | Status: DISCONTINUED | OUTPATIENT
Start: 2020-03-05 | End: 2020-03-05

## 2020-03-05 RX ORDER — MAGNESIUM SULFATE 500 MG/ML
2 VIAL (ML) INJECTION ONCE
Refills: 0 | Status: COMPLETED | OUTPATIENT
Start: 2020-03-05 | End: 2020-03-05

## 2020-03-05 RX ORDER — ALBUMIN HUMAN 25 %
250 VIAL (ML) INTRAVENOUS
Refills: 0 | Status: COMPLETED | OUTPATIENT
Start: 2020-03-05 | End: 2020-03-05

## 2020-03-05 RX ORDER — POTASSIUM CHLORIDE 20 MEQ
20 PACKET (EA) ORAL
Refills: 0 | Status: COMPLETED | OUTPATIENT
Start: 2020-03-05 | End: 2020-03-05

## 2020-03-05 RX ORDER — DEXTROSE 50 % IN WATER 50 %
15 SYRINGE (ML) INTRAVENOUS ONCE
Refills: 0 | Status: DISCONTINUED | OUTPATIENT
Start: 2020-03-05 | End: 2020-03-07

## 2020-03-05 RX ORDER — CALCIUM GLUCONATE 100 MG/ML
2 VIAL (ML) INTRAVENOUS ONCE
Refills: 0 | Status: COMPLETED | OUTPATIENT
Start: 2020-03-05 | End: 2020-03-05

## 2020-03-05 RX ORDER — SODIUM CHLORIDE 9 MG/ML
1000 INJECTION, SOLUTION INTRAVENOUS
Refills: 0 | Status: DISCONTINUED | OUTPATIENT
Start: 2020-03-05 | End: 2020-03-07

## 2020-03-05 RX ADMIN — Medication 25 MILLIEQUIVALENT(S): at 23:19

## 2020-03-05 RX ADMIN — Medication 125 MILLILITER(S): at 23:21

## 2020-03-05 RX ADMIN — CHLORHEXIDINE GLUCONATE 10 MILLILITER(S): 213 SOLUTION TOPICAL at 13:48

## 2020-03-05 RX ADMIN — FENTANYL CITRATE 25 MICROGRAM(S): 50 INJECTION INTRAVENOUS at 20:00

## 2020-03-05 RX ADMIN — SODIUM CHLORIDE 500 MILLILITER(S): 9 INJECTION INTRAMUSCULAR; INTRAVENOUS; SUBCUTANEOUS at 20:45

## 2020-03-05 RX ADMIN — Medication 100 GRAM(S): at 21:48

## 2020-03-05 RX ADMIN — PANTOPRAZOLE SODIUM 40 MILLIGRAM(S): 20 TABLET, DELAYED RELEASE ORAL at 06:56

## 2020-03-05 RX ADMIN — Medication 81 MILLIGRAM(S): at 13:48

## 2020-03-05 RX ADMIN — SODIUM CHLORIDE 3 MILLILITER(S): 9 INJECTION INTRAMUSCULAR; INTRAVENOUS; SUBCUTANEOUS at 13:56

## 2020-03-05 RX ADMIN — Medication 400 MILLIGRAM(S): at 23:22

## 2020-03-05 RX ADMIN — FENTANYL CITRATE 25 MICROGRAM(S): 50 INJECTION INTRAVENOUS at 22:00

## 2020-03-05 RX ADMIN — CHLORHEXIDINE GLUCONATE 1 APPLICATION(S): 213 SOLUTION TOPICAL at 00:30

## 2020-03-05 RX ADMIN — FENTANYL CITRATE 25 MICROGRAM(S): 50 INJECTION INTRAVENOUS at 22:15

## 2020-03-05 RX ADMIN — Medication 100 MILLIEQUIVALENT(S): at 22:05

## 2020-03-05 RX ADMIN — SODIUM CHLORIDE 3 MILLILITER(S): 9 INJECTION INTRAMUSCULAR; INTRAVENOUS; SUBCUTANEOUS at 06:58

## 2020-03-05 RX ADMIN — Medication 200 GRAM(S): at 20:43

## 2020-03-05 RX ADMIN — Medication 100 MILLIEQUIVALENT(S): at 20:55

## 2020-03-05 RX ADMIN — FENTANYL CITRATE 25 MICROGRAM(S): 50 INJECTION INTRAVENOUS at 20:15

## 2020-03-05 RX ADMIN — CHLORHEXIDINE GLUCONATE 1 APPLICATION(S): 213 SOLUTION TOPICAL at 11:21

## 2020-03-05 RX ADMIN — Medication 125 MILLILITER(S): at 21:30

## 2020-03-05 RX ADMIN — HEPARIN SODIUM 5000 UNIT(S): 5000 INJECTION INTRAVENOUS; SUBCUTANEOUS at 06:56

## 2020-03-05 NOTE — BRIEF OPERATIVE NOTE - OPERATION/FINDINGS
mild-moderate residual MR  1 clip placed  post procedure hypotension, pericardial effusion, placement of pericardiocentesis and drain

## 2020-03-05 NOTE — BRIEF OPERATIVE NOTE - NSICDXBRIEFPOSTOP_GEN_ALL_CORE_FT
POST-OP DIAGNOSIS:  Mitral regurgitation 05-Mar-2020 18:56:08  Christian Castelan  Pericardial effusion 05-Mar-2020 18:56:02  Christian Castelan

## 2020-03-05 NOTE — PROGRESS NOTE ADULT - SUBJECTIVE AND OBJECTIVE BOX
Patient discussed on morning rounds with Dr. Canas     Operation / Date: pre-op MitraClip today     SUBJECTIVE ASSESSMENT:  PT feels well this morning and son is at the bedside translating for the patient. Ambulating without difficutly. Eating/drinking well. Denies CP, palpitations, SOB, wheezing, abd pain, n/d/c, fevers or chills.     Vital Signs Last 24 Hrs  T(C): 36.8 (05 Mar 2020 09:09), Max: 37.7 (04 Mar 2020 17:25)  T(F): 98.2 (05 Mar 2020 09:09), Max: 99.8 (04 Mar 2020 17:25)  HR: 92 (05 Mar 2020 08:30) (86 - 108)  BP: 138/77 (05 Mar 2020 08:30) (109/55 - 144/82)  BP(mean): 102 (05 Mar 2020 08:30) (85 - 108)  RR: 16 (05 Mar 2020 08:30) (16 - 20)  SpO2: 97% (05 Mar 2020 08:30) (94% - 97%)  I&O's Detail    04 Mar 2020 07:01  -  05 Mar 2020 07:00  --------------------------------------------------------  IN:    Oral Fluid: 240 mL  Total IN: 240 mL    OUT:    Voided: 1250 mL  Total OUT: 1250 mL    Total NET: -1010 mL      05 Mar 2020 07:01  -  05 Mar 2020 11:55  --------------------------------------------------------  IN:    sodium chloride 0.9%.: 100 mL  Total IN: 100 mL    OUT:    Voided: 75 mL  Total OUT: 75 mL    Total NET: 25 mL    CHEST TUBE: no  GILLIAN DRAIN:  no  EPICARDIAL WIRES: no  TIE DOWNS: no  MEDEIROS: no    PHYSICAL EXAM:  General: well appearing, sitting in chair in NAD   Neurological: AOx3. Motor skills grossly intact  Cardiovascular: Normal S1/S2. Regular rate/rhythm. 3/6 diastolic murmur   Respiratory: Lungs CTA bilaterally. No wheezing or rales  Gastrointestinal: +BS in all 4 quadrants. Non-distended. Soft. Non-tender  Extremities: Strength 5/5 b/l upper/lower extremities. Sensation grossly intact upper/lower extremities. No edema. No calf tenderness.  Vascular: Radial 2+bilaterally, DP 2+ b/l  Incision Sites: none       LABS:                        13.2   12.16 )-----------( 206      ( 05 Mar 2020 07:12 )             41.1       COUMADIN:  no    PT/INR - ( 04 Mar 2020 17:23 )   PT: 10.8 sec;   INR: 0.95          PTT - ( 04 Mar 2020 17:23 )  PTT:31.2 sec    03    137  |  104  |  18  ----------------------------<  136<H>  4.4   |  21<L>  |  1.12    Ca    8.3<L>      05 Mar 2020 07:12    TPro  6.1  /  Alb  3.3  /  TBili  0.5  /  DBili  x   /  AST  13  /  ALT  11  /  AlkPhos  49  03-05      Urinalysis Basic - ( 04 Mar 2020 18:35 )    Color: Yellow / Appearance: Clear / S.015 / pH: x  Gluc: x / Ketone: NEGATIVE  / Bili: Negative / Urobili: 0.2 E.U./dL   Blood: x / Protein: NEGATIVE mg/dL / Nitrite: NEGATIVE   Leuk Esterase: NEGATIVE / RBC: x / WBC x   Sq Epi: x / Non Sq Epi: x / Bacteria: x        MEDICATIONS  (STANDING):  aspirin  chewable 81 milliGRAM(s) Oral daily  atorvastatin 40 milliGRAM(s) Oral at bedtime  chlorhexidine 0.12% Liquid 10 milliLiter(s) Swish and Spit once  heparin  Injectable 5000 Unit(s) SubCutaneous every 8 hours  pantoprazole    Tablet 40 milliGRAM(s) Oral before breakfast  predniSONE   Tablet 10 milliGRAM(s) Oral daily  senna 2 Tablet(s) Oral at bedtime  sodium chloride 0.9% lock flush 3 milliLiter(s) IV Push every 8 hours  sodium chloride 0.9%. 1000 milliLiter(s) (50 mL/Hr) IV Continuous <Continuous>    MEDICATIONS  (PRN):        RADIOLOGY & ADDITIONAL TESTS:  < from: Xray Chest 1 View- PORTABLE-Routine (20 @ 06:50) >  Impression:    No radiographic evidence of acute cardiopulmonary disease.  < end of copied text >

## 2020-03-05 NOTE — CONSULT NOTE ADULT - ASSESSMENT
Assesment:  This is an 85 y/o male, resident of [place] and Urdu speaking, former smoker (30 pack years, quit 30 years ago) with PMHx of HTN and HLD who presented to the [place] ED on 3/1/2010 with c/o chest pain and palpitations that started while he was at home eating dinner, non-radiating and resolved with rest.  He's had palpitations in the past, but never this severe.  His symptoms this time were associated with chest pain so his family brought him to the ED where VS stable and serial Troponin negative.  He is followed by Dr. Lira and has been planned for cardiac cath this month for recurrent episodes of stable angina and underwent cardiac cath during his admission which revealed 100%  distal LAD. ECHO was performed which revealed moderate to severe MR and he was referred to Dr. Canas for procedural evaluation, transferred to [facility] on 3/4/20 under his care.  On admission, patient without complaints.  Denies HA, AMS, CP, palpitations, SOB, cough, hemoptysis, n/v/d, fever.      Plan:  Problem 1: Severe MR   -evaluation for surgical mitral valve replacement vs. percutaneous     Problem 2: HTN   - restart home medications once can tolerate (holind pre-procedure)      Problem 3: HLD  - c/w atorvastatin     Problem 4: prophylaxis   GI   - c/w pantoprazole   DVT  -c/w subQ heparin     I have reviewed clinical labs tests and reports, radiology tests and reports, as well as old patient medical records, and discussed with the refering physician.

## 2020-03-05 NOTE — BRIEF OPERATIVE NOTE - COMMENTS
Dr. Leone was the first assistant for this case including but not limited to  femoral access, wire navigation, clip placement.    I was present for this procedure and participated as first assistant as described by the PA above, unless otherwise noted below.

## 2020-03-05 NOTE — CONSULT NOTE ADULT - SUBJECTIVE AND OBJECTIVE BOX
Surgeon: Dr. Leone    Requesting Physician: Floresita     HISTORY OF PRESENT ILLNESS (Need 4):  This is an 83 y/o male, resident of [place] and [place] speaking, former smoker (30 pack years, quit 30 years ago) with PMHx of HTN and HLD who presented to the [place] ED on 3/1/2010 with c/o chest pain and palpitations that started while he was at home eating dinner, non-radiating and resolved with rest.  He's had palpitations in the past, but never this severe.  His symptoms this time were associated with chest pain so his family brought him to the ED where VS stable and serial Troponin negative.  He is followed by Dr. Lira and has been planned for cardiac cath this month for recurrent episodes of stable angina and underwent cardiac cath during his admission which revealed 100%  distal LAD. ECHO was performed which revealed moderate to severe MR and he was referred to Dr. Canas for procedural evaluation, transferred to [facility] on 3/4/20 under his care.  On admission, patient without complaints.  Denies HA, AMS, CP, palpitations, SOB, cough, hemoptysis, n/v/d, fever.    PAST MEDICAL & SURGICAL HISTORY:  Mitral regurgitation  HLD (hyperlipidemia)  HTN (hypertension)  S/P hernia surgery      MEDICATIONS  (STANDING):  aspirin  chewable 81 milliGRAM(s) Oral daily  atorvastatin 40 milliGRAM(s) Oral at bedtime  chlorhexidine 0.12% Liquid 10 milliLiter(s) Swish and Spit once  heparin  Injectable 5000 Unit(s) SubCutaneous every 8 hours  pantoprazole    Tablet 40 milliGRAM(s) Oral before breakfast  predniSONE   Tablet 10 milliGRAM(s) Oral daily  senna 2 Tablet(s) Oral at bedtime  sodium chloride 0.9% lock flush 3 milliLiter(s) IV Push every 8 hours  sodium chloride 0.9%. 1000 milliLiter(s) (50 mL/Hr) IV Continuous <Continuous>    MEDICATIONS  (PRN):      Allergies    penicillin (Unknown)    Intolerances        SOCIAL HISTORY:  Smoker: former   ETOH use: no  Ilicit Drug use:  no    FAMILY HISTORY:  No pertinent family history in first degree relatives      Review of Systems (Need 10):  CONSTITUTIONAL: Denies fevers / chills, sweats, fatigue, weight loss, weight gain                                       NEURO:  Denies parathesias, seizures, syncope, confusion                                                                                  EYES:  Denies blurry vision, discharge, pain, loss of vision                                                                                    ENMT:  Denies difficulty hearing, vertigo, dysphagia, epistaxis, recent dental work                                       CV:  Denies chest pain, palpitations, [place], orthopnea                                                                                           RESPIRATORY:  Denies wWheezing, SOB, cough / sputum, hemoptysis                                                               GI:  Denies nausea, vomiting, diarrhea, constipation, melena                                                                          : Denies hematuria, dysuria, urgency, incontinence                                                                                          MUSKULOSKELETAL:  Denies arthritis, joint swelling, muscle weakness                                                             SKIN/BREAST:  Denies rash, itching, hair loss, masses                                                                                              PSYCH:  Denies depression, anxiety, suicidal ideation                                                                                                HEME/LYMPH:  Denies bruises easily, enlarged lymph nodes, tender lymph nodes                                          ENDOCRINE:  Denies cold intolerance, heat intolerance, polydipsia                                                                      Vital Signs Last 24 Hrs  T(C): 36.8 (05 Mar 2020 09:09), Max: 37.7 (04 Mar 2020 17:25)  T(F): 98.2 (05 Mar 2020 09:09), Max: 99.8 (04 Mar 2020 17:25)  HR: 92 (05 Mar 2020 08:30) (86 - 108)  BP: 138/77 (05 Mar 2020 08:30) (109/55 - 144/82)  BP(mean): 102 (05 Mar 2020 08:30) (85 - 108)  RR: 16 (05 Mar 2020 08:30) (16 - 20)  SpO2: 97% (05 Mar 2020 08:30) (94% - 97%)    Physical Exam (Need 8)  General: well appearing sitting up in bed in NAD   Neurological: AOx3. Motor skills grossly intact  Cardiovascular: Normal S1/S2. Regular rate/rhythm. 3/6 diastolic murmur   Respiratory: Lungs CTA bilaterally. No wheezing or rales  Gastrointestinal: +BS in all 4 quadrants. Non-distended. Soft. Non-tender  Extremities: Strength 5/5 b/l upper/lower extremities. Sensation grossly intact upper/lower extremities. No edema. No calf tenderness.  Vascular: Radial 2+bilaterally, DP 2+ b/l    LABS:                        13.2   12.16 )-----------( 206      ( 05 Mar 2020 07:12 )             41.1     03-05    137  |  104  |  18  ----------------------------<  136<H>  4.4   |  21<L>  |  1.12    Ca    8.3<L>      05 Mar 2020 07:12    TPro  6.1  /  Alb  3.3  /  TBili  0.5  /  DBili  x   /  AST  13  /  ALT  11  /  AlkPhos  49  03-05    PT/INR - ( 04 Mar 2020 17:23 )   PT: 10.8 sec;   INR: 0.95          PTT - ( 04 Mar 2020 17:23 )  PTT:31.2 sec  Urinalysis Basic - ( 04 Mar 2020 18:35 )    Color: Yellow / Appearance: Clear / S.015 / pH: x  Gluc: x / Ketone: NEGATIVE  / Bili: Negative / Urobili: 0.2 E.U./dL   Blood: x / Protein: NEGATIVE mg/dL / Nitrite: NEGATIVE   Leuk Esterase: NEGATIVE / RBC: x / WBC x   Sq Epi: x / Non Sq Epi: x / Bacteria: x              RADIOLOGY & ADDITIONAL STUDIES:  CAROTID U/S: < from: US Duplex Carotid Arteries Complete, Bilateral (20 @ 10:42) >  IMPRESSION:  No hemodynamically significant carotid stenosis.  < end of copied text >    CXR: < from: Xray Chest 1 View- PORTABLE-Routine (20 @ 06:50) >  Impression:    No radiographic evidence of acute cardiopulmonary disease.  < end of copied text >    CT Scan: n/a    EKG: < from: 12 Lead ECG (20 @ 18:05) >  Diagnosis Line Sinus rhythm with occasional Premature ventricular complexes  Cannot rule out Anterior infarct , age undetermined  Abnormal ECG  < end of copied text >      TTE / YOVANA: in chart     Cardiac Cath: n/a

## 2020-03-05 NOTE — PROGRESS NOTE ADULT - SUBJECTIVE AND OBJECTIVE BOX
CTICU  CRITICAL  CARE  attending     Hand off received 					   Pertinent clinical, laboratory, radiographic, hemodynamic, echocardiographic, respiratory data, microbiologic data and chart were reviewed and analyzed frequently throughout the course of the day and night  Patient seen and examined with CTS/ SH attending at bedside  Pt is a 84y , Male, HEALTH ISSUES - PROBLEM Dx:      , FAMILY HISTORY:  No pertinent family history in first degree relatives  PAST MEDICAL & SURGICAL HISTORY:  Mitral regurgitation  HLD (hyperlipidemia)  HTN (hypertension)  S/P hernia surgery    Patient is a 84y old  Male who presents with a chief complaint of Mitral regurgitation (09 Mar 2020 10:54)      14 system review was unremarkable    Vital signs, hemodynamic and respiratory parameters were reviewed from the bedside nursing flowsheet.  ICU Vital Signs Last 24 Hrs  T(C): 36.6 (09 Mar 2020 09:00), Max: 36.8 (08 Mar 2020 14:10)  T(F): 97.8 (09 Mar 2020 09:00), Max: 98.2 (08 Mar 2020 14:10)  HR: 90 (09 Mar 2020 08:31) (90 - 110)  BP: 102/57 (09 Mar 2020 08:31) (95/65 - 135/73)  BP(mean): 73 (09 Mar 2020 08:31) (73 - 97)  ABP: --  ABP(mean): --  RR: 18 (09 Mar 2020 08:31) (18 - 22)  SpO2: 92% (09 Mar 2020 08:31) (92% - 95%)    Adult Advanced Hemodynamics Last 24 Hrs  CVP(mm Hg): --  CVP(cm H2O): --  CO: --  CI: --  PA: --  PA(mean): --  PCWP: --  SVR: --  SVRI: --  PVR: --  PVRI: --, ABG - ( 08 Mar 2020 03:41 )  pH, Arterial: 7.50  pH, Blood: x     /  pCO2: 34    /  pO2: 73    / HCO3: 26    / Base Excess: 2.9   /  SaO2: 96                  Intake and output was reviewed and the fluid balance was calculated  Daily     Daily   I&O's Summary    08 Mar 2020 07:01  -  09 Mar 2020 07:00  --------------------------------------------------------  IN: 180 mL / OUT: 2150 mL / NET: -1970 mL    09 Mar 2020 07:01  -  09 Mar 2020 12:05  --------------------------------------------------------  IN: 0 mL / OUT: 1000 mL / NET: -1000 mL        All lines and drain sites were assessed  Glycemic trend was reviewedCAPILLARY BLOOD GLUCOSE        No acute change in mental status  Auscultation of the chest reveals equal bs  Abdomen is soft  Extremities are warm and well perfused  Wounds appear clean and unremarkable  Antibiotics are periop    labs  CBC Full  -  ( 09 Mar 2020 05:51 )  WBC Count : 16.33 K/uL  RBC Count : 3.49 M/uL  Hemoglobin : 9.6 g/dL  Hematocrit : 29.8 %  Platelet Count - Automated : 185 K/uL  Mean Cell Volume : 85.4 fl  Mean Cell Hemoglobin : 27.5 pg  Mean Cell Hemoglobin Concentration : 32.2 gm/dL  Auto Neutrophil # : 12.13 K/uL  Auto Lymphocyte # : 2.06 K/uL  Auto Monocyte # : 1.78 K/uL  Auto Eosinophil # : 0.15 K/uL  Auto Basophil # : 0.03 K/uL  Auto Neutrophil % : 74.3 %  Auto Lymphocyte % : 12.6 %  Auto Monocyte % : 10.9 %  Auto Eosinophil % : 0.9 %  Auto Basophil % : 0.2 %    03-09    130<L>  |  95<L>  |  27<H>  ----------------------------<  114<H>  4.2   |  24  |  1.39<H>    Ca    8.1<L>      09 Mar 2020 05:51  Phos  3.2     03-08  Mg     2.2     03-09    TPro  5.7<L>  /  Alb  3.1<L>  /  TBili  0.8  /  DBili  x   /  AST  17  /  ALT  13  /  AlkPhos  53  03-08    PT/INR - ( 08 Mar 2020 03:42 )   PT: 12.4 sec;   INR: 1.08          PTT - ( 08 Mar 2020 03:42 )  PTT:32.7 sec  The current medications were reviewed   MEDICATIONS  (STANDING):  aspirin enteric coated 81 milliGRAM(s) Oral daily  atorvastatin 40 milliGRAM(s) Oral at bedtime  buDESOnide    Inhalation Suspension 0.5 milliGRAM(s) Inhalation every 12 hours  colchicine 0.6 milliGRAM(s) Oral daily  furosemide    Tablet 40 milliGRAM(s) Oral daily  heparin  Injectable 5000 Unit(s) SubCutaneous every 8 hours  lactulose Syrup 10 Gram(s) Oral daily  levoFLOXacin IVPB 750 milliGRAM(s) IV Intermittent every 48 hours  lidocaine   Patch 1 Patch Transdermal daily  metoprolol tartrate 12.5 milliGRAM(s) Oral every 12 hours  pantoprazole    Tablet 40 milliGRAM(s) Oral before breakfast  polyethylene glycol 3350 17 Gram(s) Oral daily  ranolazine 500 milliGRAM(s) Oral two times a day  senna 2 Tablet(s) Oral at bedtime  sodium chloride 0.9% lock flush 3 milliLiter(s) IV Push every 8 hours  tiotropium 18 MICROgram(s) Capsule 1 Capsule(s) Inhalation daily    MEDICATIONS  (PRN):  acetaminophen   Tablet .. 650 milliGRAM(s) Oral every 6 hours PRN Mild Pain (1 - 3)  hydrocodone/homatropine Syrup 5 milliLiter(s) Oral every 6 hours PRN Cough  levalbuterol Inhalation 0.63 milliGRAM(s) Inhalation every 6 hours PRN shortness of breath  magnesium hydroxide Suspension 30 milliLiter(s) Oral daily PRN Constipation  traMADol 25 milliGRAM(s) Oral every 6 hours PRN Severe Pain (7 - 10)       PROBLEM LIST/ ASSESSMENT:  HEALTH ISSUES - PROBLEM Dx:      ,   Patient is a 84y old  Male who presents with a chief complaint of Mitral regurgitation (09 Mar 2020 10:54)     s/p mitraclip                My plan includes :  close hemodynamic, ventilatory and drain monitoring and management per post op routine    Monitor for arrhythmias and monitor parameters for organ perfusion  beta blockade not administered due to hemodynamic instability and bradycardia  monitor neurologic status  Head of the bed should remain elevated to 45 deg .   chest PT and IS will be encouraged  monitor adequacy of oxygenation and ventilation and attempt to wean oxygen  antibiotic regimen will be tailored to the clinical, laboratory and microbiologic data  Nutritional goals will be met using po eventually , ensure adequate caloric intake and montior the same  Stress ulcer and VTE prophylaxis will be achieved    Glycemic control is satisfactory  Electrolytes have been repleted as necessary and wound care has been carried out. Pain control has been achieved.   agressive physical therapy and early mobility and ambulation goals will be met   The family was updated about the course and plan  CRITICAL CARE TIME personally provided by me  in evaluation and management, reassessments, review and interpretation of labs and x-rays, ventilator and hemodynamic management, formulating a plan and coordinating care: ___90____ MIN.  Time does not include procedural time. Time spent was non routine post-operarive caRE and included multiple and repeated evaluations at the bedside  CTICU ATTENDING     					    Bipin Villagomez MD

## 2020-03-05 NOTE — PROGRESS NOTE ADULT - ASSESSMENT
This is a 83 y/o M with a PMHx of HTN, HLD, and known moderate-severe AS. Pt presented to [place]     Plan:    Neurovascular: Stable  -Pain well controlled with current regimen: PRN's:    Cardiovascular:   -Hemodynamically stable.   -Monitor: BP, HR, tele    Respiratory:   -Oxygentating well on room air  -Encourage continued use of IS 10x/hr and frequent ambulation  -CXR:    GI:  -GI PPX:  -PO Diet  -Bowel Regimen:     Renal / :  -Continue to monitor renal function: BUN/Cr  -Monitor I/O's daily     Endocrine:  no hx of DM or thyroid dx  -A1c:  -TSH:    Hematologic:  -CBC: H/H-  -Coagulation Panel.    ID:  -Temperature:   -CBC: WBC-  -Continue to observe for SIRS/Sepsis Syndrome.    Prophylaxis:  -DVT prophylaxis with 5000 SubQ Heparin q8h.  -Continue with SCD's b/l while patient is at rest     Disposition:  -Discharge home once patient is medically ready This is a 83 y/o M with a PMHx of HTN, HLD, and known moderate-severe AS. Pt presented to [place] ED on 3/1/2010 with complaints of chest pain and palpitations that started while he was at home eating. Pt t/f to [facility] for surgical evaluation under the care of Dr. Canas. Pt underwent PST and deemed a good candidate for MitraClip procedure and is now pre-op today for MitraClip.     Plan:  Neurovascular: Stable  -No pain     Cardiovascular: pre-op for MitraClip today w/ Dr. Canas   -Hemodynamically stable.   -Monitor: BP, HR, tele  -C/W ASA only (per Dr. Canas)   -HLD: atorvastatin 40mg daily   -Carotid dopplers: no significant stenosis   -ECHO completed yesterday     Respiratory:   -Oxygenating well on room air  -Encourage continued use of IS 10x/hr and frequent ambulation  -CXR: no acute pathology, no PTX    GI:  -GI PPX: pantoprazole 40mg daily   -PO Diet: NPO for procedure today   -Bowel Regimen: senna     Renal / :  -Continue to monitor renal function: BUN/Cr 18/1.12  -Monitor I/O's daily     Endocrine:  no hx of DM or thyroid dx  -A1c: 5.4  -TSH: 1.718  -C/w Prednisone daily     Hematologic:  -CBC: H/H- 13.2/41.1    ID:  -Temperature: afebrile   -CBC: WBC- 12.16  -Continue to observe for SIRS/Sepsis Syndrome.    Prophylaxis:  -DVT prophylaxis with 5000 SubQ Heparin q8h.  -Continue with SCD's b/l while patient is at rest     Disposition:  -Discharge home once patient is medically ready

## 2020-03-05 NOTE — CHART NOTE - NSCHARTNOTEFT_GEN_A_CORE
Cardiology Fellow On-Call Limited TTE Note    Cardiology fellow on-call paged at ~21:15 for limited TTE.  Indication: Interval evaluation of pericardial effusion.    Impression:  Trace pericardial effusion observed in subcostal view.  Effusion not hemodynamically significant.  No evidence of communication between effusion and cardiac chambers.    Case and images d/w primary service.  Full report to follow in AM.

## 2020-03-05 NOTE — BRIEF OPERATIVE NOTE - NSICDXBRIEFPROCEDURE_GEN_ALL_CORE_FT
PROCEDURES:  CT guided percutaneous cath placement for subsequent pericardiocentesis 05-Mar-2020 18:55:36  Christian Castelan  Implantation of MitraClip percutaneous mitral valve leaflet clip 05-Mar-2020 18:55:11  Christian Castelan

## 2020-03-06 LAB
ALBUMIN SERPL ELPH-MCNC: 3.7 G/DL — SIGNIFICANT CHANGE UP (ref 3.3–5)
ALBUMIN SERPL ELPH-MCNC: 3.8 G/DL — SIGNIFICANT CHANGE UP (ref 3.3–5)
ALBUMIN SERPL ELPH-MCNC: 3.9 G/DL — SIGNIFICANT CHANGE UP (ref 3.3–5)
ALP SERPL-CCNC: 36 U/L — LOW (ref 40–120)
ALP SERPL-CCNC: 37 U/L — LOW (ref 40–120)
ALP SERPL-CCNC: 44 U/L — SIGNIFICANT CHANGE UP (ref 40–120)
ALT FLD-CCNC: 12 U/L — SIGNIFICANT CHANGE UP (ref 10–45)
ALT FLD-CCNC: 12 U/L — SIGNIFICANT CHANGE UP (ref 10–45)
ALT FLD-CCNC: 13 U/L — SIGNIFICANT CHANGE UP (ref 10–45)
ANION GAP SERPL CALC-SCNC: 10 MMOL/L — SIGNIFICANT CHANGE UP (ref 5–17)
ANION GAP SERPL CALC-SCNC: 11 MMOL/L — SIGNIFICANT CHANGE UP (ref 5–17)
ANION GAP SERPL CALC-SCNC: 13 MMOL/L — SIGNIFICANT CHANGE UP (ref 5–17)
APTT BLD: 25.8 SEC — LOW (ref 27.5–36.3)
APTT BLD: 27.7 SEC — SIGNIFICANT CHANGE UP (ref 27.5–36.3)
APTT BLD: 28.8 SEC — SIGNIFICANT CHANGE UP (ref 27.5–36.3)
AST SERPL-CCNC: 17 U/L — SIGNIFICANT CHANGE UP (ref 10–40)
AST SERPL-CCNC: 19 U/L — SIGNIFICANT CHANGE UP (ref 10–40)
AST SERPL-CCNC: 20 U/L — SIGNIFICANT CHANGE UP (ref 10–40)
BILIRUB SERPL-MCNC: 0.8 MG/DL — SIGNIFICANT CHANGE UP (ref 0.2–1.2)
BILIRUB SERPL-MCNC: 1 MG/DL — SIGNIFICANT CHANGE UP (ref 0.2–1.2)
BILIRUB SERPL-MCNC: 1.5 MG/DL — HIGH (ref 0.2–1.2)
BUN SERPL-MCNC: 15 MG/DL — SIGNIFICANT CHANGE UP (ref 7–23)
BUN SERPL-MCNC: 16 MG/DL — SIGNIFICANT CHANGE UP (ref 7–23)
BUN SERPL-MCNC: 16 MG/DL — SIGNIFICANT CHANGE UP (ref 7–23)
CALCIUM SERPL-MCNC: 8.2 MG/DL — LOW (ref 8.4–10.5)
CALCIUM SERPL-MCNC: 8.3 MG/DL — LOW (ref 8.4–10.5)
CALCIUM SERPL-MCNC: 8.9 MG/DL — SIGNIFICANT CHANGE UP (ref 8.4–10.5)
CHLORIDE SERPL-SCNC: 100 MMOL/L — SIGNIFICANT CHANGE UP (ref 96–108)
CHLORIDE SERPL-SCNC: 102 MMOL/L — SIGNIFICANT CHANGE UP (ref 96–108)
CHLORIDE SERPL-SCNC: 98 MMOL/L — SIGNIFICANT CHANGE UP (ref 96–108)
CO2 SERPL-SCNC: 20 MMOL/L — LOW (ref 22–31)
CO2 SERPL-SCNC: 21 MMOL/L — LOW (ref 22–31)
CO2 SERPL-SCNC: 21 MMOL/L — LOW (ref 22–31)
CREAT SERPL-MCNC: 0.86 MG/DL — SIGNIFICANT CHANGE UP (ref 0.5–1.3)
CREAT SERPL-MCNC: 0.97 MG/DL — SIGNIFICANT CHANGE UP (ref 0.5–1.3)
CREAT SERPL-MCNC: 1.02 MG/DL — SIGNIFICANT CHANGE UP (ref 0.5–1.3)
GAS PNL BLDA: SIGNIFICANT CHANGE UP
GLUCOSE BLDC GLUCOMTR-MCNC: 120 MG/DL — HIGH (ref 70–99)
GLUCOSE BLDC GLUCOMTR-MCNC: 153 MG/DL — HIGH (ref 70–99)
GLUCOSE BLDC GLUCOMTR-MCNC: 167 MG/DL — HIGH (ref 70–99)
GLUCOSE BLDC GLUCOMTR-MCNC: 96 MG/DL — SIGNIFICANT CHANGE UP (ref 70–99)
GLUCOSE SERPL-MCNC: 142 MG/DL — HIGH (ref 70–99)
GLUCOSE SERPL-MCNC: 164 MG/DL — HIGH (ref 70–99)
GLUCOSE SERPL-MCNC: 165 MG/DL — HIGH (ref 70–99)
HCT VFR BLD CALC: 31.6 % — LOW (ref 39–50)
HCT VFR BLD CALC: 34 % — LOW (ref 39–50)
HCT VFR BLD CALC: 35.3 % — LOW (ref 39–50)
HGB BLD-MCNC: 10.2 G/DL — LOW (ref 13–17)
HGB BLD-MCNC: 10.9 G/DL — LOW (ref 13–17)
HGB BLD-MCNC: 11.3 G/DL — LOW (ref 13–17)
INR BLD: 1.04 — SIGNIFICANT CHANGE UP (ref 0.88–1.16)
INR BLD: 1.05 — SIGNIFICANT CHANGE UP (ref 0.88–1.16)
INR BLD: 1.09 — SIGNIFICANT CHANGE UP (ref 0.88–1.16)
MAGNESIUM SERPL-MCNC: 1.9 MG/DL — SIGNIFICANT CHANGE UP (ref 1.6–2.6)
MAGNESIUM SERPL-MCNC: 2 MG/DL — SIGNIFICANT CHANGE UP (ref 1.6–2.6)
MAGNESIUM SERPL-MCNC: 2.4 MG/DL — SIGNIFICANT CHANGE UP (ref 1.6–2.6)
MCHC RBC-ENTMCNC: 27.7 PG — SIGNIFICANT CHANGE UP (ref 27–34)
MCHC RBC-ENTMCNC: 27.7 PG — SIGNIFICANT CHANGE UP (ref 27–34)
MCHC RBC-ENTMCNC: 27.9 PG — SIGNIFICANT CHANGE UP (ref 27–34)
MCHC RBC-ENTMCNC: 32 GM/DL — SIGNIFICANT CHANGE UP (ref 32–36)
MCHC RBC-ENTMCNC: 32.1 GM/DL — SIGNIFICANT CHANGE UP (ref 32–36)
MCHC RBC-ENTMCNC: 32.3 GM/DL — SIGNIFICANT CHANGE UP (ref 32–36)
MCV RBC AUTO: 86.3 FL — SIGNIFICANT CHANGE UP (ref 80–100)
MCV RBC AUTO: 86.5 FL — SIGNIFICANT CHANGE UP (ref 80–100)
MCV RBC AUTO: 86.5 FL — SIGNIFICANT CHANGE UP (ref 80–100)
NRBC # BLD: 0 /100 WBCS — SIGNIFICANT CHANGE UP (ref 0–0)
PHOSPHATE SERPL-MCNC: 4.1 MG/DL — SIGNIFICANT CHANGE UP (ref 2.5–4.5)
PLATELET # BLD AUTO: 161 K/UL — SIGNIFICANT CHANGE UP (ref 150–400)
PLATELET # BLD AUTO: 166 K/UL — SIGNIFICANT CHANGE UP (ref 150–400)
PLATELET # BLD AUTO: 175 K/UL — SIGNIFICANT CHANGE UP (ref 150–400)
POTASSIUM SERPL-MCNC: 4.1 MMOL/L — SIGNIFICANT CHANGE UP (ref 3.5–5.3)
POTASSIUM SERPL-MCNC: 4.6 MMOL/L — SIGNIFICANT CHANGE UP (ref 3.5–5.3)
POTASSIUM SERPL-MCNC: 4.9 MMOL/L — SIGNIFICANT CHANGE UP (ref 3.5–5.3)
POTASSIUM SERPL-SCNC: 4.1 MMOL/L — SIGNIFICANT CHANGE UP (ref 3.5–5.3)
POTASSIUM SERPL-SCNC: 4.6 MMOL/L — SIGNIFICANT CHANGE UP (ref 3.5–5.3)
POTASSIUM SERPL-SCNC: 4.9 MMOL/L — SIGNIFICANT CHANGE UP (ref 3.5–5.3)
PROT SERPL-MCNC: 5.7 G/DL — LOW (ref 6–8.3)
PROT SERPL-MCNC: 5.8 G/DL — LOW (ref 6–8.3)
PROT SERPL-MCNC: 6 G/DL — SIGNIFICANT CHANGE UP (ref 6–8.3)
PROTHROM AB SERPL-ACNC: 11.9 SEC — SIGNIFICANT CHANGE UP (ref 10–12.9)
PROTHROM AB SERPL-ACNC: 12 SEC — SIGNIFICANT CHANGE UP (ref 10–12.9)
PROTHROM AB SERPL-ACNC: 12.5 SEC — SIGNIFICANT CHANGE UP (ref 10–12.9)
RBC # BLD: 3.66 M/UL — LOW (ref 4.2–5.8)
RBC # BLD: 3.93 M/UL — LOW (ref 4.2–5.8)
RBC # BLD: 4.08 M/UL — LOW (ref 4.2–5.8)
RBC # FLD: 13.5 % — SIGNIFICANT CHANGE UP (ref 10.3–14.5)
RBC # FLD: 13.8 % — SIGNIFICANT CHANGE UP (ref 10.3–14.5)
RBC # FLD: 13.9 % — SIGNIFICANT CHANGE UP (ref 10.3–14.5)
SODIUM SERPL-SCNC: 129 MMOL/L — LOW (ref 135–145)
SODIUM SERPL-SCNC: 133 MMOL/L — LOW (ref 135–145)
SODIUM SERPL-SCNC: 134 MMOL/L — LOW (ref 135–145)
WBC # BLD: 18.63 K/UL — HIGH (ref 3.8–10.5)
WBC # BLD: 20.06 K/UL — HIGH (ref 3.8–10.5)
WBC # BLD: 21.49 K/UL — HIGH (ref 3.8–10.5)
WBC # FLD AUTO: 18.63 K/UL — HIGH (ref 3.8–10.5)
WBC # FLD AUTO: 20.06 K/UL — HIGH (ref 3.8–10.5)
WBC # FLD AUTO: 21.49 K/UL — HIGH (ref 3.8–10.5)

## 2020-03-06 PROCEDURE — 71045 X-RAY EXAM CHEST 1 VIEW: CPT | Mod: 26

## 2020-03-06 PROCEDURE — 99024 POSTOP FOLLOW-UP VISIT: CPT

## 2020-03-06 PROCEDURE — 93010 ELECTROCARDIOGRAM REPORT: CPT

## 2020-03-06 PROCEDURE — 99291 CRITICAL CARE FIRST HOUR: CPT

## 2020-03-06 PROCEDURE — 71045 X-RAY EXAM CHEST 1 VIEW: CPT | Mod: 26,77

## 2020-03-06 PROCEDURE — 93306 TTE W/DOPPLER COMPLETE: CPT | Mod: 26

## 2020-03-06 PROCEDURE — 93312 ECHO TRANSESOPHAGEAL: CPT | Mod: 26

## 2020-03-06 PROCEDURE — 76376 3D RENDER W/INTRP POSTPROCES: CPT | Mod: 26

## 2020-03-06 PROCEDURE — 99232 SBSQ HOSP IP/OBS MODERATE 35: CPT

## 2020-03-06 RX ORDER — BUDESONIDE, MICRONIZED 100 %
0.5 POWDER (GRAM) MISCELLANEOUS EVERY 12 HOURS
Refills: 0 | Status: DISCONTINUED | OUTPATIENT
Start: 2020-03-06 | End: 2020-03-10

## 2020-03-06 RX ORDER — HEPARIN SODIUM 5000 [USP'U]/ML
5000 INJECTION INTRAVENOUS; SUBCUTANEOUS EVERY 8 HOURS
Refills: 0 | Status: DISCONTINUED | OUTPATIENT
Start: 2020-03-06 | End: 2020-03-10

## 2020-03-06 RX ORDER — FENTANYL CITRATE 50 UG/ML
25 INJECTION INTRAVENOUS ONCE
Refills: 0 | Status: DISCONTINUED | OUTPATIENT
Start: 2020-03-06 | End: 2020-03-06

## 2020-03-06 RX ORDER — NOREPINEPHRINE BITARTRATE/D5W 8 MG/250ML
0.05 PLASTIC BAG, INJECTION (ML) INTRAVENOUS
Qty: 16 | Refills: 0 | Status: DISCONTINUED | OUTPATIENT
Start: 2020-03-06 | End: 2020-03-06

## 2020-03-06 RX ORDER — COLCHICINE 0.6 MG
0.6 TABLET ORAL DAILY
Refills: 0 | Status: DISCONTINUED | OUTPATIENT
Start: 2020-03-06 | End: 2020-03-10

## 2020-03-06 RX ORDER — AMIODARONE HYDROCHLORIDE 400 MG/1
150 TABLET ORAL ONCE
Refills: 0 | Status: COMPLETED | OUTPATIENT
Start: 2020-03-06 | End: 2020-03-06

## 2020-03-06 RX ORDER — PANTOPRAZOLE SODIUM 20 MG/1
40 TABLET, DELAYED RELEASE ORAL
Refills: 0 | Status: DISCONTINUED | OUTPATIENT
Start: 2020-03-06 | End: 2020-03-10

## 2020-03-06 RX ORDER — MAGNESIUM SULFATE 500 MG/ML
2 VIAL (ML) INJECTION ONCE
Refills: 0 | Status: COMPLETED | OUTPATIENT
Start: 2020-03-06 | End: 2020-03-06

## 2020-03-06 RX ORDER — MAGNESIUM SULFATE 500 MG/ML
2 VIAL (ML) INJECTION ONCE
Refills: 0 | Status: DISCONTINUED | OUTPATIENT
Start: 2020-03-06 | End: 2020-03-06

## 2020-03-06 RX ORDER — ALPRAZOLAM 0.25 MG
0.5 TABLET ORAL ONCE
Refills: 0 | Status: DISCONTINUED | OUTPATIENT
Start: 2020-03-06 | End: 2020-03-06

## 2020-03-06 RX ORDER — ACETAMINOPHEN 500 MG
650 TABLET ORAL EVERY 6 HOURS
Refills: 0 | Status: DISCONTINUED | OUTPATIENT
Start: 2020-03-06 | End: 2020-03-10

## 2020-03-06 RX ORDER — CALCIUM GLUCONATE 100 MG/ML
2 VIAL (ML) INTRAVENOUS ONCE
Refills: 0 | Status: COMPLETED | OUTPATIENT
Start: 2020-03-06 | End: 2020-03-06

## 2020-03-06 RX ORDER — FUROSEMIDE 40 MG
10 TABLET ORAL ONCE
Refills: 0 | Status: COMPLETED | OUTPATIENT
Start: 2020-03-06 | End: 2020-03-06

## 2020-03-06 RX ORDER — ACETAMINOPHEN 500 MG
1000 TABLET ORAL ONCE
Refills: 0 | Status: COMPLETED | OUTPATIENT
Start: 2020-03-06 | End: 2020-03-06

## 2020-03-06 RX ORDER — IPRATROPIUM/ALBUTEROL SULFATE 18-103MCG
3 AEROSOL WITH ADAPTER (GRAM) INHALATION ONCE
Refills: 0 | Status: COMPLETED | OUTPATIENT
Start: 2020-03-06 | End: 2020-03-06

## 2020-03-06 RX ORDER — POLYETHYLENE GLYCOL 3350 17 G/17G
17 POWDER, FOR SOLUTION ORAL DAILY
Refills: 0 | Status: DISCONTINUED | OUTPATIENT
Start: 2020-03-06 | End: 2020-03-10

## 2020-03-06 RX ORDER — ASPIRIN/CALCIUM CARB/MAGNESIUM 324 MG
81 TABLET ORAL DAILY
Refills: 0 | Status: DISCONTINUED | OUTPATIENT
Start: 2020-03-07 | End: 2020-03-10

## 2020-03-06 RX ORDER — FUROSEMIDE 40 MG
20 TABLET ORAL ONCE
Refills: 0 | Status: COMPLETED | OUTPATIENT
Start: 2020-03-06 | End: 2020-03-06

## 2020-03-06 RX ORDER — LIDOCAINE 4 G/100G
1 CREAM TOPICAL DAILY
Refills: 0 | Status: DISCONTINUED | OUTPATIENT
Start: 2020-03-06 | End: 2020-03-10

## 2020-03-06 RX ADMIN — POLYETHYLENE GLYCOL 3350 17 GRAM(S): 17 POWDER, FOR SOLUTION ORAL at 14:48

## 2020-03-06 RX ADMIN — Medication 2: at 18:32

## 2020-03-06 RX ADMIN — Medication 0.5 MILLIGRAM(S): at 22:04

## 2020-03-06 RX ADMIN — Medication 650 MILLIGRAM(S): at 15:24

## 2020-03-06 RX ADMIN — Medication 200 GRAM(S): at 00:47

## 2020-03-06 RX ADMIN — Medication 1000 MILLIGRAM(S): at 06:08

## 2020-03-06 RX ADMIN — Medication 650 MILLIGRAM(S): at 19:40

## 2020-03-06 RX ADMIN — Medication 100 MILLIGRAM(S): at 14:55

## 2020-03-06 RX ADMIN — Medication 10 MILLIGRAM(S): at 05:05

## 2020-03-06 RX ADMIN — HEPARIN SODIUM 5000 UNIT(S): 5000 INJECTION INTRAVENOUS; SUBCUTANEOUS at 22:05

## 2020-03-06 RX ADMIN — Medication 1000 MILLIGRAM(S): at 00:28

## 2020-03-06 RX ADMIN — LIDOCAINE 1 PATCH: 4 CREAM TOPICAL at 19:39

## 2020-03-06 RX ADMIN — Medication 125 MILLILITER(S): at 00:10

## 2020-03-06 RX ADMIN — Medication 2: at 11:54

## 2020-03-06 RX ADMIN — PANTOPRAZOLE SODIUM 40 MILLIGRAM(S): 20 TABLET, DELAYED RELEASE ORAL at 07:18

## 2020-03-06 RX ADMIN — Medication 10 MILLIGRAM(S): at 07:18

## 2020-03-06 RX ADMIN — FENTANYL CITRATE 25 MICROGRAM(S): 50 INJECTION INTRAVENOUS at 02:00

## 2020-03-06 RX ADMIN — Medication 400 MILLIGRAM(S): at 04:55

## 2020-03-06 RX ADMIN — AMIODARONE HYDROCHLORIDE 600 MILLIGRAM(S): 400 TABLET ORAL at 11:34

## 2020-03-06 RX ADMIN — Medication 650 MILLIGRAM(S): at 18:51

## 2020-03-06 RX ADMIN — Medication 650 MILLIGRAM(S): at 12:48

## 2020-03-06 RX ADMIN — Medication 50 GRAM(S): at 11:34

## 2020-03-06 RX ADMIN — FENTANYL CITRATE 25 MICROGRAM(S): 50 INJECTION INTRAVENOUS at 01:38

## 2020-03-06 RX ADMIN — Medication 3 MILLILITER(S): at 19:39

## 2020-03-06 RX ADMIN — CHLORHEXIDINE GLUCONATE 1 APPLICATION(S): 213 SOLUTION TOPICAL at 14:49

## 2020-03-06 RX ADMIN — Medication 100 MILLIGRAM(S): at 03:00

## 2020-03-06 RX ADMIN — HEPARIN SODIUM 5000 UNIT(S): 5000 INJECTION INTRAVENOUS; SUBCUTANEOUS at 14:48

## 2020-03-06 RX ADMIN — Medication 125 MILLILITER(S): at 00:40

## 2020-03-06 RX ADMIN — Medication 0.5 MILLIGRAM(S): at 19:39

## 2020-03-06 RX ADMIN — Medication 20 MILLIGRAM(S): at 19:40

## 2020-03-06 RX ADMIN — ATORVASTATIN CALCIUM 40 MILLIGRAM(S): 80 TABLET, FILM COATED ORAL at 22:04

## 2020-03-06 NOTE — PROGRESS NOTE ADULT - ASSESSMENT
85 y/o M with severe MR s/p successful MitraClip, c/b pericardial effusion s/p pericardiocenteiss.    Chronic diastolic heart failure, severe MR s/p Mitraclip  -good result, mild-mod residual leak  -TTE today  -ASA/Plavix on discharge (hold plavix for now until pericardial effusion drain out)    Pericardial effusion s/p drainage  -no output from the drain overnight  -check TTE today  -if no fluid then remove drain    Hypotension/ABLA  -1U PRBC  -wean phenlyepherine    Respiratory  -intravascular dry, but fluid in the lungs on CXR  -will diurese when BP improves  -high flow NC    CAD-  of LAD, medical mgmt  -ASA/statin  -start anti-anginals when BP stable    Dispo- ICU 85 y/o M with severe MR s/p successful MitraClip, c/b pericardial effusion s/p pericardiocenteiss.    Chronic diastolic heart failure, severe MR s/p Mitraclip  -good result, mild-mod residual leak  -TTE today  -ASA/Plavix on discharge (hold plavix for now until pericardial effusion drain out)    Pericardial effusion s/p drainage  -no output from the drain overnight  -check TTE today  -if no fluid then remove drain    Hypotension  -wean phenlyepherine  -albumin bolus  -patient needs to equilibrate s/p drain    Acute blood loss anemia  -hgb ~10, no indication for transfusion    Respiratory  -intravascular dry, but fluid in the lungs on CXR  -will diurese when BP improves  -high flow NC    CAD-  of LAD, medical mgmt  -ASA/statin  -start anti-anginals when BP stable    Dispo- ICU

## 2020-03-06 NOTE — PROGRESS NOTE ADULT - SUBJECTIVE AND OBJECTIVE BOX
CTICU  CRITICAL  CARE  attending     Hand off received 					   Pertinent clinical, laboratory, radiographic, hemodynamic, echocardiographic, respiratory data, microbiologic data and chart were reviewed and analyzed frequently throughout the course of the day and night    Patient seen and examined with CTS/ SH attending at bedside  Pt is a 84y , Male, HEALTH ISSUES - PROBLEM Dx:      , FAMILY HISTORY:  No pertinent family history in first degree relatives  PAST MEDICAL & SURGICAL HISTORY:  Mitral regurgitation  HLD (hyperlipidemia)  HTN (hypertension)  S/P hernia surgery    Patient is a 84y old  Male who presents with a chief complaint of Mitral regurgitation (06 Mar 2020 11:24)     14 system review was unremarkable    Vital signs, hemodynamic and respiratory parameters were reviewed from the bedside nursing flowsheet.  ICU Vital Signs Last 24 Hrs  T(C): 36.4 (06 Mar 2020 22:43), Max: 37.2 (06 Mar 2020 14:00)  T(F): 97.6 (06 Mar 2020 22:43), Max: 99 (06 Mar 2020 14:00)  HR: 103 (06 Mar 2020 23:00) (75 - 110)  BP: 94/50 (06 Mar 2020 06:00) (94/50 - 94/50)  BP(mean): 65 (06 Mar 2020 06:00) (65 - 65)  ABP: 119/58 (06 Mar 2020 23:00) (86/47 - 155/73)  ABP(mean): 76 (06 Mar 2020 23:00) (61 - 99)  RR: 16 (06 Mar 2020 23:00) (14 - 24)  SpO2: 95% (06 Mar 2020 23:00) (93% - 99%)    Adult Advanced Hemodynamics Last 24 Hrs  CVP(mm Hg): --  CVP(cm H2O): --  CO: --  CI: --  PA: --  PA(mean): --  PCWP: --  SVR: --  SVRI: --  PVR: --  PVRI: --, ABG - ( 06 Mar 2020 10:01 )  pH, Arterial: 7.44  pH, Blood: x     /  pCO2: 33    /  pO2: 145   / HCO3: 22    / Base Excess: -2.0  /  SaO2: 99                  Intake and output was reviewed and the fluid balance was calculated  Daily     Daily   I&O's Summary    05 Mar 2020 07:01  -  06 Mar 2020 07:00  --------------------------------------------------------  IN: 2526.1 mL / OUT: 1655 mL / NET: 871.1 mL    06 Mar 2020 07:01  -  06 Mar 2020 23:23  --------------------------------------------------------  IN: 765.9 mL / OUT: 1440 mL / NET: -674.1 mL        All lines and drain sites were assessed    Neuro: No change in the mental status from the baseline. Moves all 4 extremities.  Neck: No JVD.  CVS: S1, S1, No S3.  Lungs: Good air entry bilerally.  Abd: Soft. No tenderness. + Bowel sounds.  Vascular: + DP/PT.  Extremities: No edema.  Lymphatic: Normal.  Skin: No abnormalities.      labs  CBC Full  -  ( 06 Mar 2020 15:08 )  WBC Count : 21.49 K/uL  RBC Count : 4.08 M/uL  Hemoglobin : 11.3 g/dL  Hematocrit : 35.3 %  Platelet Count - Automated : 175 K/uL  Mean Cell Volume : 86.5 fl  Mean Cell Hemoglobin : 27.7 pg  Mean Cell Hemoglobin Concentration : 32.0 gm/dL  Auto Neutrophil # : x  Auto Lymphocyte # : x  Auto Monocyte # : x  Auto Eosinophil # : x  Auto Basophil # : x  Auto Neutrophil % : x  Auto Lymphocyte % : x  Auto Monocyte % : x  Auto Eosinophil % : x  Auto Basophil % : x    03-06    129<L>  |  98  |  15  ----------------------------<  164<H>  4.9   |  20<L>  |  1.02    Ca    8.2<L>      06 Mar 2020 15:08  Phos  4.1     03-06  Mg     2.4     03-06    TPro  6.0  /  Alb  3.8  /  TBili  0.8  /  DBili  x   /  AST  19  /  ALT  13  /  AlkPhos  44  03-06    PT/INR - ( 06 Mar 2020 15:08 )   PT: 12.5 sec;   INR: 1.09          PTT - ( 06 Mar 2020 15:08 )  PTT:28.8 sec  The current medications were reviewed   MEDICATIONS  (STANDING):  atorvastatin 40 milliGRAM(s) Oral at bedtime  buDESOnide    Inhalation Suspension 0.5 milliGRAM(s) Inhalation every 12 hours  chlorhexidine 2% Cloths 1 Application(s) Topical daily  colchicine 0.6 milliGRAM(s) Oral daily  dextrose 5%. 1000 milliLiter(s) (50 mL/Hr) IV Continuous <Continuous>  dextrose 50% Injectable 12.5 Gram(s) IV Push once  dextrose 50% Injectable 25 Gram(s) IV Push once  dextrose 50% Injectable 25 Gram(s) IV Push once  heparin  Injectable 5000 Unit(s) SubCutaneous every 8 hours  insulin lispro (HumaLOG) corrective regimen sliding scale   SubCutaneous Before meals and at bedtime  lidocaine   Patch 1 Patch Transdermal daily  pantoprazole    Tablet 40 milliGRAM(s) Oral before breakfast  polyethylene glycol 3350 17 Gram(s) Oral daily  predniSONE   Tablet 10 milliGRAM(s) Oral daily  sodium chloride 0.9%. 1000 milliLiter(s) (50 mL/Hr) IV Continuous <Continuous>  vancomycin  IVPB 1000 milliGRAM(s) IV Intermittent every 12 hours    MEDICATIONS  (PRN):  acetaminophen   Tablet .. 650 milliGRAM(s) Oral every 6 hours PRN Mild Pain (1 - 3)  dextrose 40% Gel 15 Gram(s) Oral once PRN Blood Glucose LESS THAN 70 milliGRAM(s)/deciliter  glucagon  Injectable 1 milliGRAM(s) IntraMuscular once PRN Glucose LESS THAN 70 milligrams/deciliter          Patient is a 84y old  Male who presents with severe Mitral regurgitation.  S/P tavo clip  Metabolic acidosis.  Hemodynamically stable.  Good oxygenation.  Fair urine out put.  Overall doing well.      My plan includes :  Statin and Betablocker.  Close hemodynamic, ventilatory and drain monitoring and management  Monitor for arrhythmias and monitor parameters for organ perfusion  Monitor neurologic status  Monitor renal function.  Head of the bed should remain elevated to 45 deg .   Chest PT and IS will be encouraged  Monitor adequacy of oxygenation and ventilation and attempt to wean oxygen  Nutritional goals will be met using po eventually , ensure adequate caloric intake and monitor the same  Stress ulcer and VTE prophylaxis will be achieved    OPTIMIZE Glycemic control.  Electrolytes have been repleted as necessary and wound care has been carried out. Pain control has been achieved.   Aggressive physical therapy and early mobility and ambulation goals will be met   The family was updated about the course and plan  CRITICAL CARE TIME SPENT in evaluation and management, reassessments, review and interpretation of labs and x-rays, ventilator and hemodynamic management, formulating a plan and coordinating care: ___30____ MIN.  Time does not include procedural time.  CTICU ATTENDING     					    Tony Catalan MD CTICU  CRITICAL  CARE  attending     Hand off received 					   Pertinent clinical, laboratory, radiographic, hemodynamic, echocardiographic, respiratory data, microbiologic data and chart were reviewed and analyzed frequently throughout the course of the day and night    Patient seen and examined with CTS/ SH attending at bedside    This is an 85 y/o male, resident of [place] and Bulgarian speaking, former smoker (30 pack years, quit 30 years ago) with PMHx of HTN and HLD who presented to the [place] ED on 3/1/2010 with c/o chest pain and palpitations that started while he was at home eating dinner, non-radiating and resolved with rest.  He's had palpitations in the past, but never this severe.  His symptoms this time were associated with chest pain so his family brought him to the ED where VS stable and serial Troponin negative.  He is followed by Dr. Lira and has been planned for cardiac cath this month for recurrent episodes of stable angina and underwent cardiac cath during his admission which revealed 100%  distal LAD. ECHO was performed which revealed moderate to severe MR and he was referred to Dr. Canas for procedural evaluation, transferred to [facility] on 3/4/20 under his care.  On admission, patient without complaints.  Denies HA, AMS, CP, palpitations, SOB, cough, hemoptysis, n/v/d, fever.     FAMILY HISTORY:  No pertinent family history in first degree relatives  PAST MEDICAL & SURGICAL HISTORY:  Mitral regurgitation  HLD (hyperlipidemia)  HTN (hypertension)  S/P hernia surgery      14 system review was unremarkable    Vital signs, hemodynamic and respiratory parameters were reviewed from the bedside nursing flow sheet.  ICU Vital Signs Last 24 Hrs  T(C): 36.4 (06 Mar 2020 22:43), Max: 37.2 (06 Mar 2020 14:00)  T(F): 97.6 (06 Mar 2020 22:43), Max: 99 (06 Mar 2020 14:00)  HR: 103 (06 Mar 2020 23:00) (75 - 110)  BP: 94/50 (06 Mar 2020 06:00) (94/50 - 94/50)  BP(mean): 65 (06 Mar 2020 06:00) (65 - 65)  ABP: 119/58 (06 Mar 2020 23:00) (86/47 - 155/73)  ABP(mean): 76 (06 Mar 2020 23:00) (61 - 99)  RR: 16 (06 Mar 2020 23:00) (14 - 24)  SpO2: 95% (06 Mar 2020 23:00) (93% - 99%)    Adult Advanced Hemodynamics Last 24 Hrs  CVP(mm Hg): --  CVP(cm H2O): --  CO: --  CI: --  PA: --  PA(mean): --  PCWP: --  SVR: --  SVRI: --  PVR: --  PVRI: --, ABG - ( 06 Mar 2020 10:01 )  pH, Arterial: 7.44  pH, Blood: x     /  pCO2: 33    /  pO2: 145   / HCO3: 22    / Base Excess: -2.0  /  SaO2: 99                  Intake and output was reviewed and the fluid balance was calculated  Daily     Daily   I&O's Summary    05 Mar 2020 07:01  -  06 Mar 2020 07:00  --------------------------------------------------------  IN: 2526.1 mL / OUT: 1655 mL / NET: 871.1 mL    06 Mar 2020 07:01  -  06 Mar 2020 23:23  --------------------------------------------------------  IN: 765.9 mL / OUT: 1440 mL / NET: -674.1 mL        All lines and drain sites were assessed    Neuro: Follows commands.  Moves all 4 extremities.  Neck: No JVD.  CVS: S1, S2, No S3.  Lungs: Good air entry bilaterally.  Abd: Soft. No tenderness. + Bowel sounds.  Vascular: + DP/PT.  Extremities: No edema.  Lymphatic: Normal.  Skin: No abnormalities.      labs  CBC Full  -  ( 06 Mar 2020 15:08 )  WBC Count : 21.49 K/uL  RBC Count : 4.08 M/uL  Hemoglobin : 11.3 g/dL  Hematocrit : 35.3 %  Platelet Count - Automated : 175 K/uL  Mean Cell Volume : 86.5 fl  Mean Cell Hemoglobin : 27.7 pg  Mean Cell Hemoglobin Concentration : 32.0 gm/dL  Auto Neutrophil # : x  Auto Lymphocyte # : x  Auto Monocyte # : x  Auto Eosinophil # : x  Auto Basophil # : x  Auto Neutrophil % : x  Auto Lymphocyte % : x  Auto Monocyte % : x  Auto Eosinophil % : x  Auto Basophil % : x    03-06    129<L>  |  98  |  15  ----------------------------<  164<H>  4.9   |  20<L>  |  1.02    Ca    8.2<L>      06 Mar 2020 15:08  Phos  4.1     03-06  Mg     2.4     03-06    TPro  6.0  /  Alb  3.8  /  TBili  0.8  /  DBili  x   /  AST  19  /  ALT  13  /  AlkPhos  44  03-06    PT/INR - ( 06 Mar 2020 15:08 )   PT: 12.5 sec;   INR: 1.09          PTT - ( 06 Mar 2020 15:08 )  PTT:28.8 sec  The current medications were reviewed   MEDICATIONS  (STANDING):  atorvastatin 40 milliGRAM(s) Oral at bedtime  buDESOnide    Inhalation Suspension 0.5 milliGRAM(s) Inhalation every 12 hours  chlorhexidine 2% Cloths 1 Application(s) Topical daily  colchicine 0.6 milliGRAM(s) Oral daily  dextrose 5%. 1000 milliLiter(s) (50 mL/Hr) IV Continuous <Continuous>  dextrose 50% Injectable 12.5 Gram(s) IV Push once  dextrose 50% Injectable 25 Gram(s) IV Push once  dextrose 50% Injectable 25 Gram(s) IV Push once  heparin  Injectable 5000 Unit(s) SubCutaneous every 8 hours  insulin lispro (HumaLOG) corrective regimen sliding scale   SubCutaneous Before meals and at bedtime  lidocaine   Patch 1 Patch Transdermal daily  pantoprazole    Tablet 40 milliGRAM(s) Oral before breakfast  polyethylene glycol 3350 17 Gram(s) Oral daily  predniSONE   Tablet 10 milliGRAM(s) Oral daily  sodium chloride 0.9%. 1000 milliLiter(s) (50 mL/Hr) IV Continuous <Continuous>  vancomycin  IVPB 1000 milliGRAM(s) IV Intermittent every 12 hours    MEDICATIONS  (PRN):  acetaminophen   Tablet .. 650 milliGRAM(s) Oral every 6 hours PRN Mild Pain (1 - 3)  dextrose 40% Gel 15 Gram(s) Oral once PRN Blood Glucose LESS THAN 70 milliGRAM(s)/deciliter  glucagon  Injectable 1 milliGRAM(s) IntraMuscular once PRN Glucose LESS THAN 70 milligrams/deciliter          Patient is a 84y old  Male who presents with severe Mitral regurgitation.  S/P tavo clip  Metabolic acidosis.  Hemodynamically stable.  Good oxygenation.  Fair urine out put.  Overall doing well.      My plan includes :  Statin and Betablocker.  Close hemodynamic, ventilatory and drain monitoring and management  Monitor for arrhythmias and monitor parameters for organ perfusion  Monitor neurologic status  Monitor renal function.  Head of the bed should remain elevated to 45 deg .   Chest PT and IS will be encouraged  Monitor adequacy of oxygenation and ventilation and attempt to wean oxygen  Nutritional goals will be met using po eventually , ensure adequate caloric intake and monitor the same  Stress ulcer and VTE prophylaxis will be achieved    OPTIMIZE Glycemic control.  Electrolytes have been repleted as necessary and wound care has been carried out. Pain control has been achieved.   Aggressive physical therapy and early mobility and ambulation goals will be met   The family was updated about the course and plan  CRITICAL CARE TIME SPENT in evaluation and management, reassessments, review and interpretation of labs and x-rays, ventilator and hemodynamic management, formulating a plan and coordinating care: ___30____ MIN.  Time does not include procedural time.  CTICU ATTENDING     					    Tony Catalan MD

## 2020-03-06 NOTE — PROGRESS NOTE ADULT - SUBJECTIVE AND OBJECTIVE BOX
INTERVAL HPI/OVERNIGHT EVENTS:    POD#1 MitraClip/pericardiocentesis  EF 60 %    Cardiology: Dr. Lira    85yo Faroese speaking Male Hx tobacco use, HTN, HLD presented 3 with CP/palpitations. CE (-). Unstable angina     Cath: 100%  distal LAD.   ECHO: mod-severe MR and he was referred to Dr. Canas for procedural evaluation, transferred to [facility] on 3/4/20 under his care.  On admission, patient without complaints.  Denies HA, AMS, CP, palpitations, SOB, cough, hemoptysis, n/v/d, fever. (04 Mar 2020 17:08)        PAST MEDICAL & SURGICAL HISTORY:  Mitral regurgitation  HLD (hyperlipidemia)  HTN (hypertension)  S/P hernia surgery        ICU Vital Signs Last 24 Hrs  T(C): 36.9 (06 Mar 2020 09:10), Max: 36.9 (06 Mar 2020 09:10)  T(F): 98.4 (06 Mar 2020 09:10), Max: 98.4 (06 Mar 2020 09:10)  HR: 101 (06 Mar 2020 10:00) (75 - 127)  BP: 94/50 (06 Mar 2020 06:00) (94/50 - 144/70)  BP(mean): 65 (06 Mar 2020 06:00) (65 - 100)  ABP: 108/65 (06 Mar 2020 10:00) (87/47 - 163/87)  ABP(mean): 80 (06 Mar 2020 10:00) (61 - 115)  RR: 17 (06 Mar 2020 10:00) (12 - 25)  SpO2: 98% (06 Mar 2020 10:00) (93% - 100%)    Qtts:     I&O's Summary    05 Mar 2020 07:01  -  06 Mar 2020 07:00  --------------------------------------------------------  IN: 2526.1 mL / OUT: 1655 mL / NET: 871.1 mL    06 Mar 2020 07:01  -  06 Mar 2020 11:24  --------------------------------------------------------  IN: 245.9 mL / OUT: 170 mL / NET: 75.9 mL        Mode: standby      Physical Exam    Heart  Lungs  Abd  Ext  Chest  Neuro  Skin    LABS:                        10.9   20.06 )-----------( 166      ( 06 Mar 2020 09:55 )             34.0     03-06    133<L>  |  102  |  16  ----------------------------<  142<H>  4.6   |  21<L>  |  0.97    Ca    8.3<L>      06 Mar 2020 09:55  Phos  4.1     03-06  Mg     1.9     03-06    TPro  5.8<L>  /  Alb  3.7  /  TBili  1.5<H>  /  DBili  x   /  AST  20  /  ALT  12  /  AlkPhos  37<L>  03-06    PT/INR - ( 06 Mar 2020 09:55 )   PT: 12.0 sec;   INR: 1.05          PTT - ( 06 Mar 2020 09:55 )  PTT:27.7 sec  Urinalysis Basic - ( 04 Mar 2020 18:35 )    Color: Yellow / Appearance: Clear / S.015 / pH: x  Gluc: x / Ketone: NEGATIVE  / Bili: Negative / Urobili: 0.2 E.U./dL   Blood: x / Protein: NEGATIVE mg/dL / Nitrite: NEGATIVE   Leuk Esterase: NEGATIVE / RBC: x / WBC x   Sq Epi: x / Non Sq Epi: x / Bacteria: x      ABG - ( 06 Mar 2020 10:01 )  pH, Arterial: 7.44  pH, Blood: x     /  pCO2: 33    /  pO2: 145   / HCO3: 22    / Base Excess: -2.0  /  SaO2: 99                  RADIOLOGY & ADDITIONAL STUDIES:    I have spent/provided stated minutes of critical care time to this patient: INTERVAL HPI/OVERNIGHT EVENTS:    POD#1 MitraClip/pericardiocentesis  EF 60 %    Cardiology: Dr. Lria    83yo Persian speaking Male Hx tobacco use, HTN, HLD, psoriasis (on prednisone) presented 3/1 with CP/palpitations. CE (-).     Cath: 100%  distal LAD.   ECHO: mod-severe MR     TO OR 3/5: Implantation of MitraClip percutaneous mitral valve leaflet clip. CT guided percutaneous cath placement for subsequent pericardiocentesis     extubated in short post-op period   remained on Audi - transitioned to Levophed this am; HFNC overnight for reported respiratory splinting/poor resp effort - transitioned to nasal cannula (6L)    patient up and ambulating with staff this am   no acute event reported overnight    PMHx includes but is not limited to:   Mitral regurgitation  HLD (hyperlipidemia)  HTN (hypertension)  S/P hernia surgery    ICU Vital Signs Last 24 Hrs  T(C): 36.9 (06 Mar 2020 09:10), Max: 36.9 (06 Mar 2020 09:10)  T(F): 98.4 (06 Mar 2020 09:10), Max: 98.4 (06 Mar 2020 09:10)  HR: 101 (06 Mar 2020 10:00) (75 - 127) tach/SVT  BP: 94/50 (06 Mar 2020 06:00) (94/50 - 144/70)  BP(mean): 65 (06 Mar 2020 06:00) (65 - 100)  ABP: 108/65 (06 Mar 2020 10:00) (87/47 - 163/87)  ABP(mean): 80 (06 Mar 2020 10:00) (61 - 115)  SpO2: 98% (06 Mar 2020 10:00) (93% - 100%) 6L NC     Qtts:  Levophed 0.01    I&O's Summary    05 Mar 2020 07:01  -  06 Mar 2020 07:00  --------------------------------------------------------  IN: 2526.1 mL / OUT: 1655 mL / NET: 871.1 mL    06 Mar 2020 07:01  -  06 Mar 2020 11:24  --------------------------------------------------------  IN: 245.9 mL / OUT: 170 mL / NET: 75.9 mL    Physical Exam    Heart  Lungs  Abd  Ext  Chest  Neuro  Skin    LABS:                        10.9   20.06 )-----------( 166      ( 06 Mar 2020 09:55 )             34.0     03-06    133<L>  |  102  |  16  ----------------------------<  142<H>  4.6   |  21<L>  |  0.97    Ca    8.3<L>      06 Mar 2020 09:55  Phos  4.1     03-06  Mg     1.9     03-06    TPro  5.8<L>  /  Alb  3.7  /  TBili  1.5<H>  /  DBili  x   /  AST  20  /  ALT  12  /  AlkPhos  37<L>  03-06    PT/INR - ( 06 Mar 2020 09:55 )   PT: 12.0 sec;   INR: 1.05     PTT - ( 06 Mar 2020 09:55 )  PTT:27.7 sec    ABG - ( 06 Mar 2020 10:01 ) 7.44/33/145/99    RADIOLOGY & ADDITIONAL STUDIES: reviewed     Patient with symptomatic MR s/p mitraclip and pericardiocentesis    1. CV  on low dose Levophed - 0.01 - stop and observe  SVT this am - check labs 0- amiodarone bolus x 1 in progress  ECHO this am per protocol to assess clip and effusion  complete periop Abx prophylaxis  statin    2. Pulm   ambulation/incentive spiriometry   titrate supplemental oxygen down to maintain Sa02 93% or greater     glycemic control   DVT and GI prophylaxis    d/w patinet/family interpreting at bedside/staff and CTS    I have spent/provided stated minutes of critical care time to this patient: 60 min INTERVAL HPI/OVERNIGHT EVENTS:    POD#1 MitraClip/pericardiocentesis  EF 60 %    Cardiology: Dr. Lira    85yo Uzbek speaking Male Hx tobacco use, HTN, HLD, psoriasis (on prednisone) presented 3/1 with CP/palpitations. CE (-).     Cath: 100%  distal LAD.   ECHO: mod-severe MR     TO OR 3/5: Implantation of MitraClip percutaneous mitral valve leaflet clip. CT guided percutaneous cath placement for subsequent pericardiocentesis     extubated in short post-op period   remained on Audi - transitioned to Levophed this am; HFNC overnight for reported respiratory splinting/poor resp effort - transitioned to nasal cannula (6L)    patient up and ambulating with staff this am   no acute event reported overnight    PMHx includes but is not limited to:   Mitral regurgitation  HLD (hyperlipidemia)  HTN (hypertension)  S/P hernia surgery    ICU Vital Signs Last 24 Hrs  T(C): 36.9 (06 Mar 2020 09:10), Max: 36.9 (06 Mar 2020 09:10)  T(F): 98.4 (06 Mar 2020 09:10), Max: 98.4 (06 Mar 2020 09:10)  HR: 101 (06 Mar 2020 10:00) (75 - 127) tach/SVT  BP: 94/50 (06 Mar 2020 06:00) (94/50 - 144/70)  BP(mean): 65 (06 Mar 2020 06:00) (65 - 100)  ABP: 108/65 (06 Mar 2020 10:00) (87/47 - 163/87)  ABP(mean): 80 (06 Mar 2020 10:00) (61 - 115)  SpO2: 98% (06 Mar 2020 10:00) (93% - 100%) 6L NC     Qtts:  Levophed 0.01    I&O's Summary    05 Mar 2020 07:01  -  06 Mar 2020 07:00  --------------------------------------------------------  IN: 2526.1 mL / OUT: 1655 mL / NET: 871.1 mL    06 Mar 2020 07:01  -  06 Mar 2020 11:24  --------------------------------------------------------  IN: 245.9 mL / OUT: 170 mL / NET: 75.9 mL    Physical Exam    Heart - tachy/regular (+)Murmur (-)rub/gallop  Lungs - dec BS bases - resp effort improves with prompting - no rhonchi/wheeze  Abd (+)BS soft NTND (-)r/r/g  Ext - warm to touch; no cyanosis/clubbing  Neuro - alert/interactive - appropriate per family at bedside (+)language barrier. non-focal and moving all extremities  Skin - no rash    LABS:                        10.9   20.06 )-----------( 166      ( 06 Mar 2020 09:55 )             34.0     03-06    133<L>  |  102  |  16  ----------------------------<  142<H>  4.6   |  21<L>  |  0.97    Ca    8.3<L>      06 Mar 2020 09:55  Phos  4.1     03-06  Mg     1.9     03-06    TPro  5.8<L>  /  Alb  3.7  /  TBili  1.5<H>  /  DBili  x   /  AST  20  /  ALT  12  /  AlkPhos  37<L>  03-06    PT/INR - ( 06 Mar 2020 09:55 )   PT: 12.0 sec;   INR: 1.05     PTT - ( 06 Mar 2020 09:55 )  PTT:27.7 sec    ABG - ( 06 Mar 2020 10:01 ) 7.44/33/145/99    RADIOLOGY & ADDITIONAL STUDIES: reviewed     Patient with symptomatic MR s/p mitraclip and pericardiocentesis    1. CV  on low dose Levophed - 0.01 - stop and observe  SVT this am - check labs 0- amiodarone bolus x 1 in progress  ECHO this am per protocol to assess clip and effusion  complete periop Abx prophylaxis  statin    2. Pulm   ambulation/incentive spiriometry   titrate supplemental oxygen down to maintain Sa02 93% or greater     glycemic control   DVT and GI prophylaxis    d/w patient/family interpreting at bedside/staff and CTS.    I have spent/provided stated minutes of critical care time to this patient: 60 min

## 2020-03-06 NOTE — CHART NOTE - NSCHARTNOTEFT_GEN_A_CORE
CT Removal:    Pt seen and examined at bedside.  Case discussed with Dr. Canas. Minimal output from pericardial drain. Per Dr. Canas request, pericardial drain removed without incident.  Occlusive DSD placed.  CXR no obvious PTX noted.  Pt tolerated procedure well.

## 2020-03-06 NOTE — PROGRESS NOTE ADULT - SUBJECTIVE AND OBJECTIVE BOX
Subjective:  - No complaints this morning  - Events/Chart from overnight reviewed  - pericardial drain with no output    PAST MEDICAL & SURGICAL HISTORY:  Mitral regurgitation  HLD (hyperlipidemia)  HTN (hypertension)  S/P hernia surgery      Vital Signs Last 24 Hrs  T(C): 36.1 (06 Mar 2020 01:01), Max: 36.8 (05 Mar 2020 09:09)  T(F): 97 (06 Mar 2020 01:01), Max: 98.2 (05 Mar 2020 09:09)  HR: 88 (06 Mar 2020 06:00) (75 - 127)  BP: 94/50 (06 Mar 2020 06:00) (94/50 - 144/70)  BP(mean): 65 (06 Mar 2020 06:00) (65 - 102)  RR: 18 (06 Mar 2020 06:00) (12 - 25)  SpO2: 98% (06 Mar 2020 06:00) (93% - 100%)   I&O's Detail    04 Mar 2020 07:01  -  05 Mar 2020 07:00  --------------------------------------------------------  IN:    Oral Fluid: 240 mL  Total IN: 240 mL    OUT:    Voided: 1250 mL  Total OUT: 1250 mL    Total NET: -1010 mL      05 Mar 2020 07:01  -  06 Mar 2020 06:38  --------------------------------------------------------  IN:    Albumin 5%  - 250 mL: 1000 mL    IV PiggyBack: 1150 mL    phenylephrine   Infusion: 43.5 mL    propofol Infusion: 22.6 mL    sodium chloride 0.9%: 200 mL    sodium chloride 0.9%.: 100 mL  Total IN: 2516.1 mL    OUT:    Indwelling Catheter - Urethral: 1440 mL    Voided: 75 mL  Total OUT: 1515 mL    Total NET: 1001.1 mL        Daily     Daily     Physical Exam:   GEN: NAD, AAOx3  HEENT: MMM, no icterus  CV: S1 S2 RRR, +murmur  Chest: drain in place  Lung: CTAB  Abd: soft NT ND +BS  Ext: no c/c/e, no groin hematoma  Neuro: no focal neuro deficit    MEDICATIONS  (STANDING):  atorvastatin 40 milliGRAM(s) Oral at bedtime  chlorhexidine 2% Cloths 1 Application(s) Topical daily  dextrose 5%. 1000 milliLiter(s) (50 mL/Hr) IV Continuous <Continuous>  dextrose 50% Injectable 12.5 Gram(s) IV Push once  dextrose 50% Injectable 25 Gram(s) IV Push once  dextrose 50% Injectable 25 Gram(s) IV Push once  insulin lispro (HumaLOG) corrective regimen sliding scale   SubCutaneous three times a day before meals  pantoprazole    Tablet 40 milliGRAM(s) Oral before breakfast  phenylephrine    Infusion 0.1 MICROgram(s)/kG/Min (2.19 mL/Hr) IV Continuous <Continuous>  polyethylene glycol 3350 17 Gram(s) Oral daily  predniSONE   Tablet 10 milliGRAM(s) Oral daily  sodium chloride 0.9%. 1000 milliLiter(s) (50 mL/Hr) IV Continuous <Continuous>  vancomycin  IVPB 1000 milliGRAM(s) IV Intermittent every 12 hours      LABS:                        10.2   18.63 )-----------( 161      ( 06 Mar 2020 02:43 )             31.6     03-06    134<L>  |  100  |  16  ----------------------------<  165<H>  4.1   |  21<L>  |  0.86    Ca    8.9      06 Mar 2020 02:43  Phos  4.1     03-06  Mg     2.0     03-06    TPro  5.7<L>  /  Alb  3.9  /  TBili  1.0  /  DBili  x   /  AST  17  /  ALT  12  /  AlkPhos  36<L>  03-06        PT/INR - ( 06 Mar 2020 02:43 )   PT: 11.9 sec;   INR: 1.04          PTT - ( 06 Mar 2020 02:43 )  PTT:25.8 sec

## 2020-03-07 LAB
ALBUMIN SERPL ELPH-MCNC: 3.8 G/DL — SIGNIFICANT CHANGE UP (ref 3.3–5)
ALP SERPL-CCNC: 59 U/L — SIGNIFICANT CHANGE UP (ref 40–120)
ALT FLD-CCNC: 15 U/L — SIGNIFICANT CHANGE UP (ref 10–45)
ANION GAP SERPL CALC-SCNC: 13 MMOL/L — SIGNIFICANT CHANGE UP (ref 5–17)
ANION GAP SERPL CALC-SCNC: 15 MMOL/L — SIGNIFICANT CHANGE UP (ref 5–17)
ANION GAP SERPL CALC-SCNC: 16 MMOL/L — SIGNIFICANT CHANGE UP (ref 5–17)
APTT BLD: 33.9 SEC — SIGNIFICANT CHANGE UP (ref 27.5–36.3)
APTT BLD: 36.6 SEC — HIGH (ref 27.5–36.3)
AST SERPL-CCNC: 19 U/L — SIGNIFICANT CHANGE UP (ref 10–40)
BILIRUB SERPL-MCNC: 0.8 MG/DL — SIGNIFICANT CHANGE UP (ref 0.2–1.2)
BUN SERPL-MCNC: 19 MG/DL — SIGNIFICANT CHANGE UP (ref 7–23)
BUN SERPL-MCNC: 19 MG/DL — SIGNIFICANT CHANGE UP (ref 7–23)
BUN SERPL-MCNC: 24 MG/DL — HIGH (ref 7–23)
CALCIUM SERPL-MCNC: 8.4 MG/DL — SIGNIFICANT CHANGE UP (ref 8.4–10.5)
CALCIUM SERPL-MCNC: 8.4 MG/DL — SIGNIFICANT CHANGE UP (ref 8.4–10.5)
CALCIUM SERPL-MCNC: 8.6 MG/DL — SIGNIFICANT CHANGE UP (ref 8.4–10.5)
CHLORIDE SERPL-SCNC: 94 MMOL/L — LOW (ref 96–108)
CO2 SERPL-SCNC: 20 MMOL/L — LOW (ref 22–31)
CO2 SERPL-SCNC: 21 MMOL/L — LOW (ref 22–31)
CO2 SERPL-SCNC: 23 MMOL/L — SIGNIFICANT CHANGE UP (ref 22–31)
CREAT SERPL-MCNC: 1.08 MG/DL — SIGNIFICANT CHANGE UP (ref 0.5–1.3)
CREAT SERPL-MCNC: 1.09 MG/DL — SIGNIFICANT CHANGE UP (ref 0.5–1.3)
CREAT SERPL-MCNC: 1.3 MG/DL — SIGNIFICANT CHANGE UP (ref 0.5–1.3)
GAS PNL BLDA: SIGNIFICANT CHANGE UP
GAS PNL BLDA: SIGNIFICANT CHANGE UP
GLUCOSE BLDC GLUCOMTR-MCNC: 114 MG/DL — HIGH (ref 70–99)
GLUCOSE SERPL-MCNC: 126 MG/DL — HIGH (ref 70–99)
GLUCOSE SERPL-MCNC: 133 MG/DL — HIGH (ref 70–99)
GLUCOSE SERPL-MCNC: 147 MG/DL — HIGH (ref 70–99)
HCT VFR BLD CALC: 32.5 % — LOW (ref 39–50)
HCT VFR BLD CALC: 36.3 % — LOW (ref 39–50)
HCT VFR BLD CALC: 36.9 % — LOW (ref 39–50)
HGB BLD-MCNC: 10.8 G/DL — LOW (ref 13–17)
HGB BLD-MCNC: 11.6 G/DL — LOW (ref 13–17)
HGB BLD-MCNC: 11.8 G/DL — LOW (ref 13–17)
INR BLD: 1.11 — SIGNIFICANT CHANGE UP (ref 0.88–1.16)
INR BLD: 1.21 — HIGH (ref 0.88–1.16)
LACTATE SERPL-SCNC: 1.4 MMOL/L — SIGNIFICANT CHANGE UP (ref 0.5–2)
LACTATE SERPL-SCNC: 2.9 MMOL/L — HIGH (ref 0.5–2)
MAGNESIUM SERPL-MCNC: 2 MG/DL — SIGNIFICANT CHANGE UP (ref 1.6–2.6)
MAGNESIUM SERPL-MCNC: 2.3 MG/DL — SIGNIFICANT CHANGE UP (ref 1.6–2.6)
MAGNESIUM SERPL-MCNC: 2.4 MG/DL — SIGNIFICANT CHANGE UP (ref 1.6–2.6)
MCHC RBC-ENTMCNC: 27.2 PG — SIGNIFICANT CHANGE UP (ref 27–34)
MCHC RBC-ENTMCNC: 27.3 PG — SIGNIFICANT CHANGE UP (ref 27–34)
MCHC RBC-ENTMCNC: 27.8 PG — SIGNIFICANT CHANGE UP (ref 27–34)
MCHC RBC-ENTMCNC: 32 GM/DL — SIGNIFICANT CHANGE UP (ref 32–36)
MCHC RBC-ENTMCNC: 32 GM/DL — SIGNIFICANT CHANGE UP (ref 32–36)
MCHC RBC-ENTMCNC: 33.2 GM/DL — SIGNIFICANT CHANGE UP (ref 32–36)
MCV RBC AUTO: 83.5 FL — SIGNIFICANT CHANGE UP (ref 80–100)
MCV RBC AUTO: 85 FL — SIGNIFICANT CHANGE UP (ref 80–100)
MCV RBC AUTO: 85.4 FL — SIGNIFICANT CHANGE UP (ref 80–100)
NRBC # BLD: 0 /100 WBCS — SIGNIFICANT CHANGE UP (ref 0–0)
PHOSPHATE SERPL-MCNC: 2.9 MG/DL — SIGNIFICANT CHANGE UP (ref 2.5–4.5)
PHOSPHATE SERPL-MCNC: 3.6 MG/DL — SIGNIFICANT CHANGE UP (ref 2.5–4.5)
PHOSPHATE SERPL-MCNC: 3.8 MG/DL — SIGNIFICANT CHANGE UP (ref 2.5–4.5)
PLATELET # BLD AUTO: 151 K/UL — SIGNIFICANT CHANGE UP (ref 150–400)
PLATELET # BLD AUTO: 168 K/UL — SIGNIFICANT CHANGE UP (ref 150–400)
PLATELET # BLD AUTO: 174 K/UL — SIGNIFICANT CHANGE UP (ref 150–400)
POTASSIUM SERPL-MCNC: 4.2 MMOL/L — SIGNIFICANT CHANGE UP (ref 3.5–5.3)
POTASSIUM SERPL-MCNC: 4.2 MMOL/L — SIGNIFICANT CHANGE UP (ref 3.5–5.3)
POTASSIUM SERPL-MCNC: 4.4 MMOL/L — SIGNIFICANT CHANGE UP (ref 3.5–5.3)
POTASSIUM SERPL-SCNC: 4.2 MMOL/L — SIGNIFICANT CHANGE UP (ref 3.5–5.3)
POTASSIUM SERPL-SCNC: 4.2 MMOL/L — SIGNIFICANT CHANGE UP (ref 3.5–5.3)
POTASSIUM SERPL-SCNC: 4.4 MMOL/L — SIGNIFICANT CHANGE UP (ref 3.5–5.3)
PROT SERPL-MCNC: 6.3 G/DL — SIGNIFICANT CHANGE UP (ref 6–8.3)
PROTHROM AB SERPL-ACNC: 12.7 SEC — SIGNIFICANT CHANGE UP (ref 10–12.9)
PROTHROM AB SERPL-ACNC: 13.9 SEC — HIGH (ref 10–12.9)
RBC # BLD: 3.89 M/UL — LOW (ref 4.2–5.8)
RBC # BLD: 4.27 M/UL — SIGNIFICANT CHANGE UP (ref 4.2–5.8)
RBC # BLD: 4.32 M/UL — SIGNIFICANT CHANGE UP (ref 4.2–5.8)
RBC # FLD: 13.7 % — SIGNIFICANT CHANGE UP (ref 10.3–14.5)
RBC # FLD: 13.7 % — SIGNIFICANT CHANGE UP (ref 10.3–14.5)
RBC # FLD: 13.9 % — SIGNIFICANT CHANGE UP (ref 10.3–14.5)
SODIUM SERPL-SCNC: 129 MMOL/L — LOW (ref 135–145)
SODIUM SERPL-SCNC: 130 MMOL/L — LOW (ref 135–145)
SODIUM SERPL-SCNC: 131 MMOL/L — LOW (ref 135–145)
WBC # BLD: 18.63 K/UL — HIGH (ref 3.8–10.5)
WBC # BLD: 20.77 K/UL — HIGH (ref 3.8–10.5)
WBC # BLD: 21.35 K/UL — HIGH (ref 3.8–10.5)
WBC # FLD AUTO: 18.63 K/UL — HIGH (ref 3.8–10.5)
WBC # FLD AUTO: 20.77 K/UL — HIGH (ref 3.8–10.5)
WBC # FLD AUTO: 21.35 K/UL — HIGH (ref 3.8–10.5)

## 2020-03-07 PROCEDURE — 93308 TTE F-UP OR LMTD: CPT | Mod: 26

## 2020-03-07 PROCEDURE — 99233 SBSQ HOSP IP/OBS HIGH 50: CPT

## 2020-03-07 PROCEDURE — 99232 SBSQ HOSP IP/OBS MODERATE 35: CPT

## 2020-03-07 PROCEDURE — 71045 X-RAY EXAM CHEST 1 VIEW: CPT | Mod: 26

## 2020-03-07 RX ORDER — LEVALBUTEROL 1.25 MG/.5ML
0.63 SOLUTION, CONCENTRATE RESPIRATORY (INHALATION) EVERY 6 HOURS
Refills: 0 | Status: DISCONTINUED | OUTPATIENT
Start: 2020-03-07 | End: 2020-03-09

## 2020-03-07 RX ORDER — METOPROLOL TARTRATE 50 MG
12.5 TABLET ORAL EVERY 12 HOURS
Refills: 0 | Status: DISCONTINUED | OUTPATIENT
Start: 2020-03-07 | End: 2020-03-10

## 2020-03-07 RX ORDER — FUROSEMIDE 40 MG
20 TABLET ORAL ONCE
Refills: 0 | Status: COMPLETED | OUTPATIENT
Start: 2020-03-07 | End: 2020-03-07

## 2020-03-07 RX ORDER — LEVALBUTEROL 1.25 MG/.5ML
0.63 SOLUTION, CONCENTRATE RESPIRATORY (INHALATION) EVERY 6 HOURS
Refills: 0 | Status: DISCONTINUED | OUTPATIENT
Start: 2020-03-07 | End: 2020-03-07

## 2020-03-07 RX ORDER — RANOLAZINE 500 MG/1
500 TABLET, FILM COATED, EXTENDED RELEASE ORAL
Refills: 0 | Status: DISCONTINUED | OUTPATIENT
Start: 2020-03-07 | End: 2020-03-10

## 2020-03-07 RX ORDER — SENNA PLUS 8.6 MG/1
2 TABLET ORAL AT BEDTIME
Refills: 0 | Status: DISCONTINUED | OUTPATIENT
Start: 2020-03-07 | End: 2020-03-10

## 2020-03-07 RX ORDER — MAGNESIUM HYDROXIDE 400 MG/1
30 TABLET, CHEWABLE ORAL DAILY
Refills: 0 | Status: DISCONTINUED | OUTPATIENT
Start: 2020-03-07 | End: 2020-03-10

## 2020-03-07 RX ORDER — TRAMADOL HYDROCHLORIDE 50 MG/1
25 TABLET ORAL EVERY 6 HOURS
Refills: 0 | Status: DISCONTINUED | OUTPATIENT
Start: 2020-03-07 | End: 2020-03-10

## 2020-03-07 RX ORDER — MAGNESIUM SULFATE 500 MG/ML
2 VIAL (ML) INJECTION ONCE
Refills: 0 | Status: COMPLETED | OUTPATIENT
Start: 2020-03-07 | End: 2020-03-07

## 2020-03-07 RX ORDER — AMIODARONE HYDROCHLORIDE 400 MG/1
150 TABLET ORAL ONCE
Refills: 0 | Status: COMPLETED | OUTPATIENT
Start: 2020-03-07 | End: 2020-03-07

## 2020-03-07 RX ADMIN — TRAMADOL HYDROCHLORIDE 25 MILLIGRAM(S): 50 TABLET ORAL at 16:30

## 2020-03-07 RX ADMIN — Medication 0.5 MILLIGRAM(S): at 18:04

## 2020-03-07 RX ADMIN — Medication 50 GRAM(S): at 05:51

## 2020-03-07 RX ADMIN — HEPARIN SODIUM 5000 UNIT(S): 5000 INJECTION INTRAVENOUS; SUBCUTANEOUS at 06:22

## 2020-03-07 RX ADMIN — Medication 81 MILLIGRAM(S): at 12:04

## 2020-03-07 RX ADMIN — ATORVASTATIN CALCIUM 40 MILLIGRAM(S): 80 TABLET, FILM COATED ORAL at 21:26

## 2020-03-07 RX ADMIN — Medication 10 MILLIGRAM(S): at 06:21

## 2020-03-07 RX ADMIN — Medication 100 MILLIGRAM(S): at 03:51

## 2020-03-07 RX ADMIN — Medication 12.5 MILLIGRAM(S): at 13:22

## 2020-03-07 RX ADMIN — Medication 0.6 MILLIGRAM(S): at 12:05

## 2020-03-07 RX ADMIN — LIDOCAINE 1 PATCH: 4 CREAM TOPICAL at 18:04

## 2020-03-07 RX ADMIN — Medication 650 MILLIGRAM(S): at 06:44

## 2020-03-07 RX ADMIN — HEPARIN SODIUM 5000 UNIT(S): 5000 INJECTION INTRAVENOUS; SUBCUTANEOUS at 21:23

## 2020-03-07 RX ADMIN — SENNA PLUS 2 TABLET(S): 8.6 TABLET ORAL at 21:23

## 2020-03-07 RX ADMIN — TRAMADOL HYDROCHLORIDE 25 MILLIGRAM(S): 50 TABLET ORAL at 15:38

## 2020-03-07 RX ADMIN — PANTOPRAZOLE SODIUM 40 MILLIGRAM(S): 20 TABLET, DELAYED RELEASE ORAL at 06:21

## 2020-03-07 RX ADMIN — LIDOCAINE 1 PATCH: 4 CREAM TOPICAL at 07:34

## 2020-03-07 RX ADMIN — Medication 20 MILLIGRAM(S): at 09:21

## 2020-03-07 RX ADMIN — Medication 650 MILLIGRAM(S): at 07:40

## 2020-03-07 RX ADMIN — POLYETHYLENE GLYCOL 3350 17 GRAM(S): 17 POWDER, FOR SOLUTION ORAL at 12:05

## 2020-03-07 RX ADMIN — RANOLAZINE 500 MILLIGRAM(S): 500 TABLET, FILM COATED, EXTENDED RELEASE ORAL at 18:04

## 2020-03-07 RX ADMIN — MAGNESIUM HYDROXIDE 30 MILLILITER(S): 400 TABLET, CHEWABLE ORAL at 15:38

## 2020-03-07 RX ADMIN — Medication 0.5 MILLIGRAM(S): at 06:19

## 2020-03-07 RX ADMIN — AMIODARONE HYDROCHLORIDE 600 MILLIGRAM(S): 400 TABLET ORAL at 06:44

## 2020-03-07 RX ADMIN — HEPARIN SODIUM 5000 UNIT(S): 5000 INJECTION INTRAVENOUS; SUBCUTANEOUS at 13:22

## 2020-03-07 RX ADMIN — CHLORHEXIDINE GLUCONATE 1 APPLICATION(S): 213 SOLUTION TOPICAL at 12:05

## 2020-03-07 RX ADMIN — LIDOCAINE 1 PATCH: 4 CREAM TOPICAL at 12:05

## 2020-03-07 NOTE — PROGRESS NOTE ADULT - SUBJECTIVE AND OBJECTIVE BOX
CTICU  CRITICAL  CARE  attending     Hand off received 					   Pertinent clinical, laboratory, radiographic, hemodynamic, echocardiographic, respiratory data, microbiologic data and chart were reviewed and analyzed frequently throughout the course of the day and night    Patient seen and examined with CTS/ SH attending at bedside    This is an 85 y/o male, resident of [place] and Irish speaking, former smoker (30 pack years, quit 30 years ago) with PMHx of HTN and HLD who presented to the [place] ED on 3/1/2010 with c/o chest pain and palpitations that started while he was at home eating dinner, non-radiating and resolved with rest.  He's had palpitations in the past, but never this severe.  His symptoms this time were associated with chest pain so his family brought him to the ED where VS stable and serial Troponin negative.  He is followed by Dr. Lira and has been planned for cardiac cath this month for recurrent episodes of stable angina and underwent cardiac cath during his admission which revealed 100%  distal LAD. ECHO was performed which revealed moderate to severe MR and he was referred to Dr. Canas for procedural evaluation, transferred to [facility] on 3/4/20 under his care.  On admission, patient without complaints.  Denies HA, AMS, CP, palpitations, SOB, cough, hemoptysis, n/v/d, fever.   S/P Mrai clip     FAMILY HISTORY:  No pertinent family history in first degree relatives  PAST MEDICAL & SURGICAL HISTORY:  Mitral regurgitation  HLD (hyperlipidemia)  HTN (hypertension)  S/P hernia surgery      14 system review was unremarkable    Vital signs, hemodynamic and respiratory parameters were reviewed from the bedside nursing flow sheet.  ICU Vital Signs Last 24 Hrs  T(C): 37.2 (07 Mar 2020 17:00), Max: 37.2 (07 Mar 2020 17:00)  T(F): 98.9 (07 Mar 2020 17:00), Max: 98.9 (07 Mar 2020 17:00)  HR: 97 (07 Mar 2020 22:00) (97 - 113)  BP: 146/61 (07 Mar 2020 21:00) (146/61 - 146/61)  BP(mean): 88 (07 Mar 2020 21:00) (88 - 88)  ABP: 141/56 (07 Mar 2020 22:00) (107/56 - 172/76)  ABP(mean): 80 (07 Mar 2020 22:00) (69 - 105)  RR: 18 (07 Mar 2020 22:00) (15 - 20)  SpO2: 96% (07 Mar 2020 22:00) (91% - 97%)    Adult Advanced Hemodynamics Last 24 Hrs  CVP(mm Hg): --  CVP(cm H2O): --  CO: --  CI: --  PA: --  PA(mean): --  PCWP: --  SVR: --  SVRI: --  PVR: --  PVRI: --, ABG - ( 07 Mar 2020 13:18 )  pH, Arterial: 7.47  pH, Blood: x     /  pCO2: 30    /  pO2: 70    / HCO3: 22    / Base Excess: -1.3  /  SaO2: 95                  Intake and output was reviewed and the fluid balance was calculated  Daily     Daily   I&O's Summary    06 Mar 2020 07:01  -  07 Mar 2020 07:00  --------------------------------------------------------  IN: 1065.9 mL / OUT: 2366 mL / NET: -1300.1 mL    07 Mar 2020 06:01  -  07 Mar 2020 23:00  --------------------------------------------------------  IN: 830 mL / OUT: 2640 mL / NET: -1810 mL        All lines and drain sites were assessed    Neuro: Follows commands. Moves all 4 extremities.  Neck: No JVD.  CVS: S1, S2, No S3.  Lungs: Good air entry bilaterally. Scattered rhonchi.  Abd: Soft. No tenderness. + Bowel sounds.  Vascular: + DP/PT.  Extremities: No edema.  Lymphatic: Normal.  Skin: No abnormalities.      labs  CBC Full  -  ( 07 Mar 2020 21:19 )  WBC Count : 18.63 K/uL  RBC Count : 3.89 M/uL  Hemoglobin : 10.8 g/dL  Hematocrit : 32.5 %  Platelet Count - Automated : 151 K/uL  Mean Cell Volume : 83.5 fl  Mean Cell Hemoglobin : 27.8 pg  Mean Cell Hemoglobin Concentration : 33.2 gm/dL  Auto Neutrophil # : x  Auto Lymphocyte # : x  Auto Monocyte # : x  Auto Eosinophil # : x  Auto Basophil # : x  Auto Neutrophil % : x  Auto Lymphocyte % : x  Auto Monocyte % : x  Auto Eosinophil % : x  Auto Basophil % : x    03-07    130<L>  |  94<L>  |  24<H>  ----------------------------<  133<H>  4.4   |  23  |  1.30    Ca    8.4      07 Mar 2020 21:19  Phos  2.9     03-07  Mg     2.3     03-07    TPro  6.3  /  Alb  3.8  /  TBili  0.8  /  DBili  x   /  AST  19  /  ALT  15  /  AlkPhos  59  03-07    PT/INR - ( 07 Mar 2020 13:21 )   PT: 12.7 sec;   INR: 1.11          PTT - ( 07 Mar 2020 13:21 )  PTT:36.6 sec  The current medications were reviewed   MEDICATIONS  (STANDING):  aspirin enteric coated 81 milliGRAM(s) Oral daily  atorvastatin 40 milliGRAM(s) Oral at bedtime  buDESOnide    Inhalation Suspension 0.5 milliGRAM(s) Inhalation every 12 hours  chlorhexidine 2% Cloths 1 Application(s) Topical daily  colchicine 0.6 milliGRAM(s) Oral daily  heparin  Injectable 5000 Unit(s) SubCutaneous every 8 hours  lidocaine   Patch 1 Patch Transdermal daily  metoprolol tartrate 12.5 milliGRAM(s) Oral every 12 hours  pantoprazole    Tablet 40 milliGRAM(s) Oral before breakfast  polyethylene glycol 3350 17 Gram(s) Oral daily  predniSONE   Tablet 10 milliGRAM(s) Oral daily  ranolazine 500 milliGRAM(s) Oral two times a day  senna 2 Tablet(s) Oral at bedtime    MEDICATIONS  (PRN):  acetaminophen   Tablet .. 650 milliGRAM(s) Oral every 6 hours PRN Mild Pain (1 - 3)  glucagon  Injectable 1 milliGRAM(s) IntraMuscular once PRN Glucose LESS THAN 70 milligrams/deciliter  hydrocodone/homatropine Syrup 5 milliLiter(s) Oral every 6 hours PRN Cough  levalbuterol Inhalation 0.63 milliGRAM(s) Inhalation every 6 hours PRN shortness of breath  magnesium hydroxide Suspension 30 milliLiter(s) Oral daily PRN Constipation  traMADol 25 milliGRAM(s) Oral every 6 hours PRN Severe Pain (7 - 10)            Patient is a 84y old  Male who presents with severe Mitral regurgitation.  S/P mari clip  S/P Mitral valve repair.  Bedside ECHO showed small pericardial effusion.  Hemodynamically stable.  Good oxygenation.  Fair urine out put.  Overall doing well.      My plan includes :  Statin and Betablocker.  PO after load reduction.  Close hemodynamic, ventilatory and drain monitoring and management  Monitor for arrhythmias and monitor parameters for organ perfusion  Monitor neurologic status  Monitor renal function.  Head of the bed should remain elevated to 45 deg .   Chest PT and IS will be encouraged  Monitor adequacy of oxygenation and ventilation and attempt to wean oxygen  Nutritional goals will be met using po eventually , ensure adequate caloric intake and monitor the same  Stress ulcer and VTE prophylaxis will be achieved    Glycemic control is satisfactory  Electrolytes have been repleted as necessary and wound care has been carried out. Pain control has been achieved.   Aggressive physical therapy and early mobility and ambulation goals will be met   The family was updated about the course and plan  CRITICAL CARE TIME SPENT in evaluation and management, reassessments, review and interpretation of labs and x-rays, ventilator and hemodynamic management, formulating a plan and coordinating care: ___30____ MIN.  Time does not include procedural time.  CTICU ATTENDING     					    Tony Catalan MD

## 2020-03-07 NOTE — PROGRESS NOTE ADULT - SUBJECTIVE AND OBJECTIVE BOX
AWADALLA, FAWZY  MRN-[id number]    Patient is a 84y old  Male who presents with a chief complaint of Mitral regurgitation (06 Mar 2020 23:23)    HPI:  85 y/o male, resident of [place] and Maced[place] speaking, former smoker (30 pack years, quit 30 years ago) with PMHx of HTN and HLD who presented to the [place] ED on 3/1/2010 with c/o chest pain and palpitations that started while he was at home eating dinner, non-radiating and resolved with rest and found to have severe mitral regurgitation s/p Mitraclip complicated pericardial effusion s/p drainage. Per history he's had palpitations in the past, but never this severe.  His symptoms this time were associated with chest pain so his family brought him to the ED. He underwent a cardiac cath during his admission which revealed 100%  distal LAD. ECHO was performed which revealed moderate to severe MR and he was referred to [facility]. He is now s/p a Mitraclip which was complicated by pericardial effusion s/p drainage and shock requiring pressors, now off.     PAST MEDICAL & SURGICAL HISTORY:  Mitral regurgitation  HLD (hyperlipidemia)  HTN (hypertension)  S/P hernia surgery    FAMILY HISTORY:  No pertinent family history in first degree relatives    Social History:  Denies illicit drug use or frequent alcohol consumption. Endorses prior smoking.     Allergies  penicillin (Unknown)    MEDICATIONS  (STANDING):  aspirin enteric coated 81 milliGRAM(s) Oral daily  atorvastatin 40 milliGRAM(s) Oral at bedtime  buDESOnide    Inhalation Suspension 0.5 milliGRAM(s) Inhalation every 12 hours  chlorhexidine 2% Cloths 1 Application(s) Topical daily  colchicine 0.6 milliGRAM(s) Oral daily  heparin  Injectable 5000 Unit(s) SubCutaneous every 8 hours  lidocaine   Patch 1 Patch Transdermal daily  metoprolol tartrate 12.5 milliGRAM(s) Oral every 12 hours  pantoprazole    Tablet 40 milliGRAM(s) Oral before breakfast  polyethylene glycol 3350 17 Gram(s) Oral daily  predniSONE   Tablet 10 milliGRAM(s) Oral daily  ranolazine 500 milliGRAM(s) Oral two times a day  sodium chloride 0.9%. 1000 milliLiter(s) (50 mL/Hr) IV Continuous <Continuous>    MEDICATIONS  (PRN):  acetaminophen   Tablet .. 650 milliGRAM(s) Oral every 6 hours PRN Mild Pain (1 - 3)  glucagon  Injectable 1 milliGRAM(s) IntraMuscular once PRN Glucose LESS THAN 70 milligrams/deciliter    Review of Systems:  Constitutional:  Negative for weight change, fever, malaise  HEENT:  Negative for sinus pain, hoarseness, sore throat, dysphagia, vision changes  Cardiovascular:  Negative for chest pain, palpitations, dizziness  Respiratory:  Negative for cough, wheezing, dyspnea  Gastrointestinal:  Negative for nausea, vomiting, diarrhea, melena  Musculoskeletal:  Negative for pain, swelling, stiffness   Neuro:  Negative for weakness, numbness, headache  Psych:  Negative for anxiety, depression  Endocrine:  Negative for polyuria, polydipsia, temperature Intolerance    All other systems negative unless otherwise stated    ICU Vital Signs Last 24 Hrs  T(C): 37.1 (07 Mar 2020 14:00), Max: 37.1 (06 Mar 2020 17:15)  T(F): 98.7 (07 Mar 2020 14:00), Max: 98.7 (06 Mar 2020 17:15)  HR: 97 (07 Mar 2020 15:00) (95 - 113)  BP: --  BP(mean): --  ABP: 117/52 (07 Mar 2020 15:00) (107/56 - 159/70)  ABP(mean): 71 (07 Mar 2020 15:00) (69 - 97)  RR: 20 (07 Mar 2020 15:00) (14 - 20)  SpO2: 97% (07 Mar 2020 15:00) (91% - 97%)    Daily     Daily   I&O's Summary    06 Mar 2020 07:01  -  07 Mar 2020 07:00  --------------------------------------------------------  IN: 1065.9 mL / OUT: 2366 mL / NET: -1300.1 mL    07 Mar 2020 06:01  -  07 Mar 2020 15:06  --------------------------------------------------------  IN: 440 mL / OUT: 1840 mL / NET: -1400 mL    Physical Exam:  Gen: Alert, no apparent distress  CV: Regular rate and rhythm no murmurs, rubs or gallops  Pulm: Clear to auscultation bilaterally, no rales, rhonchi or wheezes  GI: Abd is soft, non-tender and non-distended with +BS  Ext: No clubbing, cyanosis; 1+ bilateral lower extremity edema  Neuro: A+Ox3, follows commands and moves all extremities    Labs:                        11.6   20.77 )-----------( 168      ( 07 Mar 2020 13:21 )             36.3     03-07    131<L>  |  94<L>  |  19  ----------------------------<  147<H>  4.2   |  21<L>  |  1.09    Ca    8.6      07 Mar 2020 13:21  Phos  3.6     03-07  Mg     2.4     03-07    TPro  6.3  /  Alb  3.8  /  TBili  0.8  /  DBili  x   /  AST  19  /  ALT  15  /  AlkPhos  59  03-07  PT/INR - ( 07 Mar 2020 13:21 )   PT: 12.7 sec;   INR: 1.11     PTT - ( 07 Mar 2020 13:21 )  PTT:36.6 sec    Assessment/Plan: 85 y/o male, resident of [place] and [place] speaking, former smoker (30 pack years, quit 30 years ago) with PMHx of HTN and HLD who presented to the [facility] ED on 3/1/2010 with c/o chest pain and palpitations that started while he was at home eating dinner, non-radiating and resolved with rest and found to have severe mitral regurgitation s/p Mitraclip complicated pericardial effusion s/p drainage.    Neuro:   Pain control with Percocet / Tylenol                                          Cardiovascular:    Stable hemodynamics  Not on any pressors  ASA, statin, Ranexa  Tolerating Lopressor  Colchicine for pericarditis  Continue hemodynamic monitoring    Respiratory:  Pt is comfortable on nasal cannula  Encourage incentive spirometry  Continue nebulizers    GI:  DASH diet  Continue bowel regimen  Continue Protonix  Continue Zofran for nausea - PRN	          Renal:  Volume up, getting intermittent Lasix  Monitor I/Os and electrolytes    Hematologic / Oncology:  No signs of active bleeding, trend CBC  HSQ for DVT ppl    Infectious disease:  All surgical incision / chest tube sites look clean  No fever or other signs of infection     Endocrine:  Continue Accuchecks with coverage    All clinical, lab, hemodynamic and radiographic data were reviewed and the plan was discussed with CTICU team.     Jeovanny Kelly MD

## 2020-03-08 LAB
ALBUMIN SERPL ELPH-MCNC: 3.1 G/DL — LOW (ref 3.3–5)
ALP SERPL-CCNC: 53 U/L — SIGNIFICANT CHANGE UP (ref 40–120)
ALT FLD-CCNC: 13 U/L — SIGNIFICANT CHANGE UP (ref 10–45)
ANION GAP SERPL CALC-SCNC: 12 MMOL/L — SIGNIFICANT CHANGE UP (ref 5–17)
APTT BLD: 32.7 SEC — SIGNIFICANT CHANGE UP (ref 27.5–36.3)
AST SERPL-CCNC: 17 U/L — SIGNIFICANT CHANGE UP (ref 10–40)
BILIRUB SERPL-MCNC: 0.8 MG/DL — SIGNIFICANT CHANGE UP (ref 0.2–1.2)
BUN SERPL-MCNC: 24 MG/DL — HIGH (ref 7–23)
CALCIUM SERPL-MCNC: 8.3 MG/DL — LOW (ref 8.4–10.5)
CHLORIDE SERPL-SCNC: 96 MMOL/L — SIGNIFICANT CHANGE UP (ref 96–108)
CO2 SERPL-SCNC: 24 MMOL/L — SIGNIFICANT CHANGE UP (ref 22–31)
CREAT SERPL-MCNC: 1.24 MG/DL — SIGNIFICANT CHANGE UP (ref 0.5–1.3)
GAS PNL BLDA: SIGNIFICANT CHANGE UP
GLUCOSE SERPL-MCNC: 110 MG/DL — HIGH (ref 70–99)
HCT VFR BLD CALC: 31.7 % — LOW (ref 39–50)
HGB BLD-MCNC: 10.1 G/DL — LOW (ref 13–17)
INR BLD: 1.08 — SIGNIFICANT CHANGE UP (ref 0.88–1.16)
LACTATE SERPL-SCNC: 0.8 MMOL/L — SIGNIFICANT CHANGE UP (ref 0.5–2)
MAGNESIUM SERPL-MCNC: 2.3 MG/DL — SIGNIFICANT CHANGE UP (ref 1.6–2.6)
MCHC RBC-ENTMCNC: 26.9 PG — LOW (ref 27–34)
MCHC RBC-ENTMCNC: 31.9 GM/DL — LOW (ref 32–36)
MCV RBC AUTO: 84.5 FL — SIGNIFICANT CHANGE UP (ref 80–100)
NRBC # BLD: 0 /100 WBCS — SIGNIFICANT CHANGE UP (ref 0–0)
PHOSPHATE SERPL-MCNC: 3.2 MG/DL — SIGNIFICANT CHANGE UP (ref 2.5–4.5)
PLATELET # BLD AUTO: 152 K/UL — SIGNIFICANT CHANGE UP (ref 150–400)
POTASSIUM SERPL-MCNC: 4.4 MMOL/L — SIGNIFICANT CHANGE UP (ref 3.5–5.3)
POTASSIUM SERPL-SCNC: 4.4 MMOL/L — SIGNIFICANT CHANGE UP (ref 3.5–5.3)
PROT SERPL-MCNC: 5.7 G/DL — LOW (ref 6–8.3)
PROTHROM AB SERPL-ACNC: 12.4 SEC — SIGNIFICANT CHANGE UP (ref 10–12.9)
RBC # BLD: 3.75 M/UL — LOW (ref 4.2–5.8)
RBC # FLD: 13.7 % — SIGNIFICANT CHANGE UP (ref 10.3–14.5)
SODIUM SERPL-SCNC: 132 MMOL/L — LOW (ref 135–145)
WBC # BLD: 15.48 K/UL — HIGH (ref 3.8–10.5)
WBC # FLD AUTO: 15.48 K/UL — HIGH (ref 3.8–10.5)

## 2020-03-08 PROCEDURE — 71045 X-RAY EXAM CHEST 1 VIEW: CPT | Mod: 26

## 2020-03-08 PROCEDURE — 76604 US EXAM CHEST: CPT | Mod: 26

## 2020-03-08 PROCEDURE — 99291 CRITICAL CARE FIRST HOUR: CPT

## 2020-03-08 RX ORDER — FUROSEMIDE 40 MG
40 TABLET ORAL DAILY
Refills: 0 | Status: DISCONTINUED | OUTPATIENT
Start: 2020-03-09 | End: 2020-03-09

## 2020-03-08 RX ORDER — LACTULOSE 10 G/15ML
10 SOLUTION ORAL DAILY
Refills: 0 | Status: DISCONTINUED | OUTPATIENT
Start: 2020-03-08 | End: 2020-03-10

## 2020-03-08 RX ORDER — FUROSEMIDE 40 MG
20 TABLET ORAL ONCE
Refills: 0 | Status: COMPLETED | OUTPATIENT
Start: 2020-03-08 | End: 2020-03-08

## 2020-03-08 RX ORDER — AZITHROMYCIN 500 MG/1
250 TABLET, FILM COATED ORAL DAILY
Refills: 0 | Status: DISCONTINUED | OUTPATIENT
Start: 2020-03-09 | End: 2020-03-09

## 2020-03-08 RX ORDER — TIOTROPIUM BROMIDE 18 UG/1
1 CAPSULE ORAL; RESPIRATORY (INHALATION) DAILY
Refills: 0 | Status: DISCONTINUED | OUTPATIENT
Start: 2020-03-08 | End: 2020-03-10

## 2020-03-08 RX ORDER — AZITHROMYCIN 500 MG/1
500 TABLET, FILM COATED ORAL ONCE
Refills: 0 | Status: COMPLETED | OUTPATIENT
Start: 2020-03-08 | End: 2020-03-08

## 2020-03-08 RX ORDER — SODIUM CHLORIDE 9 MG/ML
3 INJECTION INTRAMUSCULAR; INTRAVENOUS; SUBCUTANEOUS EVERY 8 HOURS
Refills: 0 | Status: DISCONTINUED | OUTPATIENT
Start: 2020-03-08 | End: 2020-03-10

## 2020-03-08 RX ADMIN — TIOTROPIUM BROMIDE 1 CAPSULE(S): 18 CAPSULE ORAL; RESPIRATORY (INHALATION) at 14:35

## 2020-03-08 RX ADMIN — Medication 0.5 MILLIGRAM(S): at 18:55

## 2020-03-08 RX ADMIN — CHLORHEXIDINE GLUCONATE 1 APPLICATION(S): 213 SOLUTION TOPICAL at 11:38

## 2020-03-08 RX ADMIN — LIDOCAINE 1 PATCH: 4 CREAM TOPICAL at 19:15

## 2020-03-08 RX ADMIN — PANTOPRAZOLE SODIUM 40 MILLIGRAM(S): 20 TABLET, DELAYED RELEASE ORAL at 06:08

## 2020-03-08 RX ADMIN — AZITHROMYCIN 500 MILLIGRAM(S): 500 TABLET, FILM COATED ORAL at 14:36

## 2020-03-08 RX ADMIN — LIDOCAINE 1 PATCH: 4 CREAM TOPICAL at 11:38

## 2020-03-08 RX ADMIN — Medication 81 MILLIGRAM(S): at 11:38

## 2020-03-08 RX ADMIN — SODIUM CHLORIDE 3 MILLILITER(S): 9 INJECTION INTRAMUSCULAR; INTRAVENOUS; SUBCUTANEOUS at 21:58

## 2020-03-08 RX ADMIN — RANOLAZINE 500 MILLIGRAM(S): 500 TABLET, FILM COATED, EXTENDED RELEASE ORAL at 06:07

## 2020-03-08 RX ADMIN — Medication 12.5 MILLIGRAM(S): at 21:02

## 2020-03-08 RX ADMIN — POLYETHYLENE GLYCOL 3350 17 GRAM(S): 17 POWDER, FOR SOLUTION ORAL at 11:38

## 2020-03-08 RX ADMIN — LIDOCAINE 1 PATCH: 4 CREAM TOPICAL at 23:00

## 2020-03-08 RX ADMIN — HEPARIN SODIUM 5000 UNIT(S): 5000 INJECTION INTRAVENOUS; SUBCUTANEOUS at 06:07

## 2020-03-08 RX ADMIN — SENNA PLUS 2 TABLET(S): 8.6 TABLET ORAL at 21:58

## 2020-03-08 RX ADMIN — Medication 10 MILLIGRAM(S): at 06:09

## 2020-03-08 RX ADMIN — Medication 12.5 MILLIGRAM(S): at 06:08

## 2020-03-08 RX ADMIN — LACTULOSE 10 GRAM(S): 10 SOLUTION ORAL at 13:45

## 2020-03-08 RX ADMIN — LEVALBUTEROL 0.63 MILLIGRAM(S): 1.25 SOLUTION, CONCENTRATE RESPIRATORY (INHALATION) at 06:09

## 2020-03-08 RX ADMIN — LEVALBUTEROL 0.63 MILLIGRAM(S): 1.25 SOLUTION, CONCENTRATE RESPIRATORY (INHALATION) at 18:55

## 2020-03-08 RX ADMIN — Medication 0.6 MILLIGRAM(S): at 11:38

## 2020-03-08 RX ADMIN — LIDOCAINE 1 PATCH: 4 CREAM TOPICAL at 00:06

## 2020-03-08 RX ADMIN — Medication 20 MILLIGRAM(S): at 09:16

## 2020-03-08 RX ADMIN — Medication 0.5 MILLIGRAM(S): at 06:59

## 2020-03-08 RX ADMIN — SODIUM CHLORIDE 3 MILLILITER(S): 9 INJECTION INTRAMUSCULAR; INTRAVENOUS; SUBCUTANEOUS at 13:30

## 2020-03-08 RX ADMIN — Medication 10 MILLIGRAM(S): at 14:35

## 2020-03-08 RX ADMIN — ATORVASTATIN CALCIUM 40 MILLIGRAM(S): 80 TABLET, FILM COATED ORAL at 21:58

## 2020-03-08 RX ADMIN — TRAMADOL HYDROCHLORIDE 25 MILLIGRAM(S): 50 TABLET ORAL at 06:57

## 2020-03-08 RX ADMIN — HEPARIN SODIUM 5000 UNIT(S): 5000 INJECTION INTRAVENOUS; SUBCUTANEOUS at 21:57

## 2020-03-08 RX ADMIN — RANOLAZINE 500 MILLIGRAM(S): 500 TABLET, FILM COATED, EXTENDED RELEASE ORAL at 18:55

## 2020-03-08 RX ADMIN — TRAMADOL HYDROCHLORIDE 25 MILLIGRAM(S): 50 TABLET ORAL at 06:10

## 2020-03-08 RX ADMIN — HEPARIN SODIUM 5000 UNIT(S): 5000 INJECTION INTRAVENOUS; SUBCUTANEOUS at 13:45

## 2020-03-08 NOTE — PROGRESS NOTE ADULT - SUBJECTIVE AND OBJECTIVE BOX
INTERVAL HPI/OVERNIGHT EVENTS:    3/5 MitraClip/pericardiocentesis  EF 60 %    Cardiology: Dr. Lira    83yo Belarusian speaking Male Hx tobacco use, HTN, HLD, psoriasis (on prednisone) presented 3/1 with CP/palpitations. CE (-).     Cath: 100%  distal LAD.   ECHO: mod-severe MR     TO OR 3/5: Implantation of MitraClip percutaneous mitral valve leaflet clip. CT guided percutaneous cath placement for subsequent pericardiocentesis     extubated in short post-op period   splinting/poor resp effort initially - improved with diuresis and mobilitzation and now on 2L NC    patient up and ambulating with staff this am   no acute event reported overnight    PMHx includes but is not limited to:   Mitral regurgitation  HLD (hyperlipidemia)  HTN (hypertension)  S/P hernia surgery    ICU Vital Signs Last 24 Hrs  T(C): 37.1 (08 Mar 2020 09:05), Max: 37.2 (07 Mar 2020 17:00)  T(F): 98.7 (08 Mar 2020 09:05), Max: 98.9 (07 Mar 2020 17:00)  HR: 100 (08 Mar 2020 10:00) (92 - 113) sinus   BP: 113/70 (08 Mar 2020 09:20) (107/73 - 146/61)  BP(mean): 85 (08 Mar 2020 09:20) (85 - 88)  ABP: 108/55 (08 Mar 2020 10:00) (93/46 - 172/76)  ABP(mean): 72 (08 Mar 2020 10:00) (60 - 105)  RR: 18 (08 Mar 2020 10:00) (15 - 20)  SpO2: 94% (08 Mar 2020 10:00) (93% - 97%) 2L NC    Qtts: None     I&O's Summary    07 Mar 2020 06:01  -  08 Mar 2020 07:00  --------------------------------------------------------  IN: 970 mL / OUT: 3040 mL / NET: -2070 mL    08 Mar 2020 07:01  -  08 Mar 2020 10:46  --------------------------------------------------------  IN: 60 mL / OUT: 300 mL / NET: -240 mL    Physical Exam    Heart  Lungs  Abd  Ext  Chest  Neuro  Skin    LABS:                        10.1   15.48 )-----------( 152      ( 08 Mar 2020 03:42 )             31.7     03-08    132<L>  |  96  |  24<H>  ----------------------------<  110<H>  4.4   |  24  |  1.24    Ca    8.3<L>      08 Mar 2020 03:42  Phos  3.2     03-08  Mg     2.3     03-08    TPro  5.7<L>  /  Alb  3.1<L>  /  TBili  0.8  /  DBili  x   /  AST  17  /  ALT  13  /  AlkPhos  53  03-08    PT/INR - ( 08 Mar 2020 03:42 )   PT: 12.4 sec;   INR: 1.08          PTT - ( 08 Mar 2020 03:42 )  PTT:32.7 sec    ABG - ( 08 Mar 2020 03:41 )  pH, Arterial: 7.50  pH, Blood: x     /  pCO2: 34    /  pO2: 73    / HCO3: 26    / Base Excess: 2.9   /  SaO2: 96                  RADIOLOGY & ADDITIONAL STUDIES:    I have spent/provided stated minutes of critical care time to this patient: INTERVAL HPI/OVERNIGHT EVENTS:    3/5 MitraClip/pericardiocentesis  EF 60 %    Cardiology: Dr. Lira    83yo Syriac speaking Male Hx tobacco use, HTN, HLD, psoriasis (on prednisone) presented 3/1 with CP/palpitations. CE (-).     Cath: 100%  distal LAD.   ECHO: mod-severe MR     TO OR 3/5: Implantation of MitraClip percutaneous mitral valve leaflet clip. CT guided percutaneous cath placement for subsequent pericardiocentesis     extubated in short post-op period   splinting/poor resp effort initially - improved with diuresis and mobilitzation and now on 2L NC    patient up and ambulating with staff this am   no acute event reported overnight    PMHx includes but is not limited to:   Mitral regurgitation  HLD (hyperlipidemia)  HTN (hypertension)  S/P hernia surgery    ICU Vital Signs Last 24 Hrs  T(C): 37.1 (08 Mar 2020 09:05), Max: 37.2 (07 Mar 2020 17:00)  T(F): 98.7 (08 Mar 2020 09:05), Max: 98.9 (07 Mar 2020 17:00)  HR: 100 (08 Mar 2020 10:00) (92 - 113) sinus   BP: 113/70 (08 Mar 2020 09:20) (107/73 - 146/61)  BP(mean): 85 (08 Mar 2020 09:20) (85 - 88)  ABP: 108/55 (08 Mar 2020 10:00) (93/46 - 172/76)  ABP(mean): 72 (08 Mar 2020 10:00) (60 - 105)  RR: 18 (08 Mar 2020 10:00) (15 - 20)  SpO2: 94% (08 Mar 2020 10:00) (93% - 97%) 2L NC    Qtts: None     I&O's Summary    07 Mar 2020 06:01  -  08 Mar 2020 07:00  --------------------------------------------------------  IN: 970 mL / OUT: 3040 mL / NET: -2070 mL    08 Mar 2020 07:01  -  08 Mar 2020 10:46  --------------------------------------------------------  IN: 60 mL / OUT: 300 mL / NET: -240 mL    Physical Exam    Heart - regular (-)rub/gallop  Lungs - CTA anterior/posterior - resp effort improved with prompting - no rhonchi/wheeze  Abd - (+)BS soft NTND (-)r/r/g  Ext - warm to touch; no cyanosis/clubbing  Neuro - alert/interactive - non-focal   Skin - no rash     LABS:                        10.1   15.48 )-----------( 152      ( 08 Mar 2020 03:42 )             31.7     03-08    132<L>  |  96  |  24<H>  ----------------------------<  110<H>  4.4   |  24  |  1.24    Ca    8.3<L>      08 Mar 2020 03:42  Phos  3.2     03-08  Mg     2.3     03-08    TPro  5.7<L>  /  Alb  3.1<L>  /  TBili  0.8  /  DBili  x   /  AST  17  /  ALT  13  /  AlkPhos  53  03-08    PT/INR - ( 08 Mar 2020 03:42 )   PT: 12.4 sec;   INR: 1.08     PTT - ( 08 Mar 2020 03:42 )  PTT:32.7 sec    ABG - ( 08 Mar 2020 03:41 ) 7.5/34/73/96    RADIOLOGY & ADDITIONAL STUDIES: reviewed       Patient with symptomatic MR s/p mitraclip and pericardiocentesis    1. CV  hemodynamically stable  prior SVT/fib - amiodarone given and in sinus   ASA/statin  lasix 40 qd  Metoprolol 12.5 q12h    2. Pulm   ambulation/incentive spirometry   titrate supplemental oxygen down to maintain Sa02 93% or greater - currently on 2L     on prednisone (home dosing) 10mg for psoriasis  glycemic control -  serum 110  last BM 3/6  remove steve     possible transfer to floor later today    DVT and GI prophylaxis    d/w patient/family interpreting at bedside/staff and structural heart team.    I have spent/provided stated minutes of critical care time to this patient: 60 min

## 2020-03-08 NOTE — PHYSICAL THERAPY INITIAL EVALUATION ADULT - GENERAL OBSERVATIONS, REHAB EVAL
Patient received sitting out of bed in chair, no apparent distress, +triple lumen catheter, +telemetry, + radial arterial line, +4LNC.

## 2020-03-08 NOTE — CHART NOTE - NSCHARTNOTEFT_GEN_A_CORE
Exam:  US Chest    Procedure Date: 3/8/2020    History: 84y Male whose CXR today shows a possible right sided pleural effusion.  Pt is s/p Mitral valve clip     Findings:                    Evaluation of the right side of the thoracic cavity demonstrates                   minimal pleural effusion                  Lung is freely movable with in thoracic cavity                    Evaluation of the Left side of the thoracic cavity demonstrates                   small pleural effusion ~100                  Lung is freely movable with in thoracic cavity  Impression:  minimal to small b/l pleural effusions

## 2020-03-08 NOTE — PROGRESS NOTE ADULT - ASSESSMENT
This is an 83 y/o male, resident of [place] and English speaking, former smoker (30 pack years, quit 30 years ago) with PMHx of HTN and HLD who presented to the [place] ED on 3/1/2010 with c/o chest pain and palpitations that started while he was at home eating dinner, non-radiating and resolved with rest. His family brought him to the ED. Upon arrival VS stable and serial Troponin negative. He was admitted for further monitoring and work up. Cardiac cath revealed 100%  distal LAD. ECHO showed moderate to severe MR and he was transferred to [facility] under Dr. Canas for further workup and management.  On 3/5 pt underwent mitraclip. Procedure complicated by pericardial effusion requiring pericardial drain placement. He was transferred to the CTICU intubated. He was extubated to [facility] on POD 0. On POD 1 pressors were weaned off and pt was transitioned to NC. POD 1 echocardiogram showed moderate to severe MR This is an 85 y/o male, resident of [place] and Macedonian speaking, former smoker (30 pack years, quit 30 years ago) with PMHx of HTN and HLD who presented to the [place] ED on 3/1/2010 with c/o chest pain and palpitations that started while he was at home eating dinner, non-radiating and resolved with rest. His family brought him to the ED. Upon arrival VS stable and serial Troponin negative. He was admitted for further monitoring and work up. Cardiac cath revealed 100%  distal LAD. ECHO showed moderate to severe MR and he was transferred to [facility] under Dr. Canas for further workup and management.  On 3/5 pt underwent mitraclip. Procedure complicated by pericardial effusion requiring pericardial drain placement. He was transferred to the CTICU intubated. He was extubated to [facility] on POD 0. On POD 1 pressors were weaned off and pt was transitioned to NC. POD 1 echocardiogram showed moderate to severe MR . Pericardial drain was removed. Pt also had post-operative afib which converted back to NSR with amiodarone bolus. POD 3 he was stepped down to [address].     Neuro: Pain well controlled with current regimen  - No delirium, Mentating well  - Tylenol/Tramadol PRN pain  - Lidocaine patch    Respiratory: former smoker  - CXR showing right sided atelectasis vs effusion vs consolidation  - Bedside in US showing atelectasis, minimal b/l effusions  - Azithromycin started for tracheobronchitis  - Increased prednisone to 20mg today   - Expiratory wheezing on physical exam, continue xoponex and atrovent  - Continue to wean O2 as toelrated  - Encourage ambulation C+DB and use of IS 10x / hr while awake    Cardiac: POD 3 mitraclip  - Severe MR s/p mitraclip c/b pericardial effusion and pericardial drain  - Pericardial drain removed POD 1   - Echo (3/6) showing residual moderate/severe MR and flail mitral leaflet. No pericardial effusion  - Echo (3/7) hyperdynamic ventricle EF >75%, no pericardial effusion   - Post op afib converted with amio bolus, now in NSR/Sinus tach  - Continue aspirin/BB/statin  - Continue colchicine 0.6mg daily x 2 weeks  - Continue ranolazine 500mg BID for angina    GI: Constipation  - Continue bowel regimen, added lactulose  - GI PPX with protonix 40mg daily  - PO diet     Renal/: BUN/Cr (24/1.24)  - Monitor renal function  - Diuresis with lasix 40mg daily, received additional dose on lasix 20mg IVP today  - Goal net negative today, monitor I&Os  - Replete K<4.0, Mg<2.0    Heme: H&H10/31  - H&H stable, continue to monitor   - DVT ppx with 5000u SQH, SCDs b/l    Endocrinology: no hx of thyroid dz or DM  - A1C 5.4  - TSH wnl    ID: afebrile WBC 15.4  - Right sided opacity on CXR with sputum production  - Started course of azithromycin for tracheobronchitis  - Observe for SIRS/Sepsis Syndrome    Dispo: Home when medically cleared

## 2020-03-08 NOTE — PHYSICAL THERAPY INITIAL EVALUATION ADULT - PERTINENT HX OF CURRENT PROBLEM, REHAB EVAL
84 year old male presented to the [place] ED on 3/1/2010 with c/o chest pain and palpitations that started while he was at home eating dinner, non-radiating and resolved with rest.  He's had palpitations in the past, but never this severe.  His symptoms this time were associated with chest pain so his family brought him to the ED where VS stable and serial Troponin negative.

## 2020-03-08 NOTE — PHYSICAL THERAPY INITIAL EVALUATION ADULT - ADDITIONAL COMMENTS
Pt lives with family in home with 3 flights of 5 steps each, non-consecutive. Pt is fully independent PTA, no hx of falls or use of DME. Khmer speaking with son as .

## 2020-03-08 NOTE — PROGRESS NOTE ADULT - SUBJECTIVE AND OBJECTIVE BOX
Patient discussed on morning rounds with Dr. Ruffin     Operation / Date: 3/5/20 Transfemoral Mitraclip x1, pericardiocentesis and drain placement EF 60%    SUBJECTIVE ASSESSMENT:  84y Male seen and examined at the bedside with son for interpretation. He ambulated multiple times today, reports feeling some chest discomfort when he is ambulating, no WESTBROOK. He denies CP or WESTBROOK at rest. He denies having a BM or passing flatus since his procedure, denies abd pain, abd distention, n/v, and states he has had normal appetite.          Vital Signs Last 24 Hrs  T(C): 37.1 (08 Mar 2020 09:05), Max: 37.2 (07 Mar 2020 17:00)  T(F): 98.7 (08 Mar 2020 09:05), Max: 98.9 (07 Mar 2020 17:00)  HR: 107 (08 Mar 2020 12:00) (92 - 110)  BP: 117/85 (08 Mar 2020 12:00) (107/73 - 146/61)  BP(mean): 97 (08 Mar 2020 12:00) (85 - 97)  RR: 18 (08 Mar 2020 12:00) (15 - 20)  SpO2: 93% (08 Mar 2020 12:00) (93% - 97%)  I&O's Detail    07 Mar 2020 06:01  -  08 Mar 2020 07:00  --------------------------------------------------------  IN:    IV PiggyBack: 100 mL    Oral Fluid: 830 mL    sodium chloride 0.9%: 40 mL  Total IN: 970 mL    OUT:    Indwelling Catheter - Urethral: 1115 mL    Voided: 1925 mL  Total OUT: 3040 mL    Total NET: -2070 mL      08 Mar 2020 07:01  -  08 Mar 2020 13:17  --------------------------------------------------------  IN:    Oral Fluid: 60 mL  Total IN: 60 mL    OUT:    Voided: 300 mL  Total OUT: 300 mL    Total NET: -240 mL          CHEST TUBE:  no  GILLIAN DRAIN:  No.  EPICARDIAL WIRES: No.  TIE DOWNS: No.  MEDEIROS: No.    PHYSICAL EXAM:    General: Lying comfortably in bed, NAD    Neurological: A&O x3, no focal deficits, strength 5/5 throughout, sensation grossly preserved    Cardiovascular: RRR, normal s1 s2, no M/R/G    Respiratory: Non-labored breathing, chest expansion symmetric, CTA b/l, no W/R/R    Gastrointestinal: +BS x4 quadrant, soft, non-tender to palpation, non-distended    Extremities: WWP, no pitting edema, no calf tenderness    Vascular:  2+DP pulses b/l    Incision: Right sided groin surgical site CD&I, tender to palpation     LABS:                        10.1   15.48 )-----------( 152      ( 08 Mar 2020 03:42 )             31.7       COUMADIN:  no  PT/INR - ( 08 Mar 2020 03:42 )   PT: 12.4 sec;   INR: 1.08          PTT - ( 08 Mar 2020 03:42 )  PTT:32.7 sec    03-08    132<L>  |  96  |  24<H>  ----------------------------<  110<H>  4.4   |  24  |  1.24    Ca    8.3<L>      08 Mar 2020 03:42  Phos  3.2     03-08  Mg     2.3     03-08    TPro  5.7<L>  /  Alb  3.1<L>  /  TBili  0.8  /  DBili  x   /  AST  17  /  ALT  13  /  AlkPhos  53  03-08          MEDICATIONS  (STANDING):  aspirin enteric coated 81 milliGRAM(s) Oral daily  atorvastatin 40 milliGRAM(s) Oral at bedtime  buDESOnide    Inhalation Suspension 0.5 milliGRAM(s) Inhalation every 12 hours  chlorhexidine 2% Cloths 1 Application(s) Topical daily  colchicine 0.6 milliGRAM(s) Oral daily  heparin  Injectable 5000 Unit(s) SubCutaneous every 8 hours  lidocaine   Patch 1 Patch Transdermal daily  metoprolol tartrate 12.5 milliGRAM(s) Oral every 12 hours  pantoprazole    Tablet 40 milliGRAM(s) Oral before breakfast  polyethylene glycol 3350 17 Gram(s) Oral daily  predniSONE   Tablet 10 milliGRAM(s) Oral daily  ranolazine 500 milliGRAM(s) Oral two times a day  senna 2 Tablet(s) Oral at bedtime  sodium chloride 0.9% lock flush 3 milliLiter(s) IV Push every 8 hours    MEDICATIONS  (PRN):  acetaminophen   Tablet .. 650 milliGRAM(s) Oral every 6 hours PRN Mild Pain (1 - 3)  hydrocodone/homatropine Syrup 5 milliLiter(s) Oral every 6 hours PRN Cough  levalbuterol Inhalation 0.63 milliGRAM(s) Inhalation every 6 hours PRN shortness of breath  magnesium hydroxide Suspension 30 milliLiter(s) Oral daily PRN Constipation  traMADol 25 milliGRAM(s) Oral every 6 hours PRN Severe Pain (7 - 10)        RADIOLOGY & ADDITIONAL TESTS:  CXR: R sided atelectasis Patient discussed on morning rounds with Dr. Ruffin     Operation / Date: 3/5/20 Transfemoral Mitraclip x1, pericardiocentesis and drain placement EF 60%    SUBJECTIVE ASSESSMENT:  84y Male seen and examined at the bedside with son for interpretation. He ambulated multiple times today, reports feeling some chest discomfort when he is ambulating, no WESTBROOK. He denies CP or WESTBROOK at rest. He denies having a BM or passing flatus since his procedure, denies abd pain, abd distention, n/v, and states he has had normal appetite.          Vital Signs Last 24 Hrs  T(C): 37.1 (08 Mar 2020 09:05), Max: 37.2 (07 Mar 2020 17:00)  T(F): 98.7 (08 Mar 2020 09:05), Max: 98.9 (07 Mar 2020 17:00)  HR: 107 (08 Mar 2020 12:00) (92 - 110)  BP: 117/85 (08 Mar 2020 12:00) (107/73 - 146/61)  BP(mean): 97 (08 Mar 2020 12:00) (85 - 97)  RR: 18 (08 Mar 2020 12:00) (15 - 20)  SpO2: 93% (08 Mar 2020 12:00) (93% - 97%)  I&O's Detail    07 Mar 2020 06:01  -  08 Mar 2020 07:00  --------------------------------------------------------  IN:    IV PiggyBack: 100 mL    Oral Fluid: 830 mL    sodium chloride 0.9%: 40 mL  Total IN: 970 mL    OUT:    Indwelling Catheter - Urethral: 1115 mL    Voided: 1925 mL  Total OUT: 3040 mL    Total NET: -2070 mL      08 Mar 2020 07:01  -  08 Mar 2020 13:17  --------------------------------------------------------  IN:    Oral Fluid: 60 mL  Total IN: 60 mL    OUT:    Voided: 300 mL  Total OUT: 300 mL    Total NET: -240 mL          CHEST TUBE:  no  GILLIAN DRAIN:  No.  EPICARDIAL WIRES: No.  TIE DOWNS: No.  MEDEIROS: No.    PHYSICAL EXAM:    General: Lying comfortably in bed, NAD    Neurological: A&O x3, no focal deficits, strength 5/5 throughout, sensation grossly preserved    Cardiovascular: RRR, normal s1 s2, no M/R/G    Respiratory: Non-labored breathing, chest expansion symmetric, + expiratory wheezes  throughout, on NC    Gastrointestinal: +BS x4 quadrant, soft, non-tender to palpation, non-distended    Extremities: WWP, no pitting edema, no calf tenderness    Vascular:  2+DP pulses b/l    Incision: Right sided groin surgical site CD&I, tender to palpation     LABS:                        10.1   15.48 )-----------( 152      ( 08 Mar 2020 03:42 )             31.7       COUMADIN:  no  PT/INR - ( 08 Mar 2020 03:42 )   PT: 12.4 sec;   INR: 1.08          PTT - ( 08 Mar 2020 03:42 )  PTT:32.7 sec    03-08    132<L>  |  96  |  24<H>  ----------------------------<  110<H>  4.4   |  24  |  1.24    Ca    8.3<L>      08 Mar 2020 03:42  Phos  3.2     03-08  Mg     2.3     03-08    TPro  5.7<L>  /  Alb  3.1<L>  /  TBili  0.8  /  DBili  x   /  AST  17  /  ALT  13  /  AlkPhos  53  03-08          MEDICATIONS  (STANDING):  aspirin enteric coated 81 milliGRAM(s) Oral daily  atorvastatin 40 milliGRAM(s) Oral at bedtime  buDESOnide    Inhalation Suspension 0.5 milliGRAM(s) Inhalation every 12 hours  chlorhexidine 2% Cloths 1 Application(s) Topical daily  colchicine 0.6 milliGRAM(s) Oral daily  heparin  Injectable 5000 Unit(s) SubCutaneous every 8 hours  lidocaine   Patch 1 Patch Transdermal daily  metoprolol tartrate 12.5 milliGRAM(s) Oral every 12 hours  pantoprazole    Tablet 40 milliGRAM(s) Oral before breakfast  polyethylene glycol 3350 17 Gram(s) Oral daily  predniSONE   Tablet 10 milliGRAM(s) Oral daily  ranolazine 500 milliGRAM(s) Oral two times a day  senna 2 Tablet(s) Oral at bedtime  sodium chloride 0.9% lock flush 3 milliLiter(s) IV Push every 8 hours    MEDICATIONS  (PRN):  acetaminophen   Tablet .. 650 milliGRAM(s) Oral every 6 hours PRN Mild Pain (1 - 3)  hydrocodone/homatropine Syrup 5 milliLiter(s) Oral every 6 hours PRN Cough  levalbuterol Inhalation 0.63 milliGRAM(s) Inhalation every 6 hours PRN shortness of breath  magnesium hydroxide Suspension 30 milliLiter(s) Oral daily PRN Constipation  traMADol 25 milliGRAM(s) Oral every 6 hours PRN Severe Pain (7 - 10)        RADIOLOGY & ADDITIONAL TESTS:  CXR: R sided atelectasis

## 2020-03-09 DIAGNOSIS — J15.9 UNSPECIFIED BACTERIAL PNEUMONIA: ICD-10-CM

## 2020-03-09 DIAGNOSIS — R06.2 WHEEZING: ICD-10-CM

## 2020-03-09 LAB
ANION GAP SERPL CALC-SCNC: 11 MMOL/L — SIGNIFICANT CHANGE UP (ref 5–17)
ANION GAP SERPL CALC-SCNC: 15 MMOL/L — SIGNIFICANT CHANGE UP (ref 5–17)
BASOPHILS # BLD AUTO: 0.03 K/UL — SIGNIFICANT CHANGE UP (ref 0–0.2)
BASOPHILS NFR BLD AUTO: 0.2 % — SIGNIFICANT CHANGE UP (ref 0–2)
BUN SERPL-MCNC: 27 MG/DL — HIGH (ref 7–23)
BUN SERPL-MCNC: 32 MG/DL — HIGH (ref 7–23)
CALCIUM SERPL-MCNC: 8.1 MG/DL — LOW (ref 8.4–10.5)
CALCIUM SERPL-MCNC: 8.6 MG/DL — SIGNIFICANT CHANGE UP (ref 8.4–10.5)
CHLORIDE SERPL-SCNC: 90 MMOL/L — LOW (ref 96–108)
CHLORIDE SERPL-SCNC: 95 MMOL/L — LOW (ref 96–108)
CO2 SERPL-SCNC: 23 MMOL/L — SIGNIFICANT CHANGE UP (ref 22–31)
CO2 SERPL-SCNC: 24 MMOL/L — SIGNIFICANT CHANGE UP (ref 22–31)
CREAT SERPL-MCNC: 1.29 MG/DL — SIGNIFICANT CHANGE UP (ref 0.5–1.3)
CREAT SERPL-MCNC: 1.39 MG/DL — HIGH (ref 0.5–1.3)
CULTURE RESULTS: SIGNIFICANT CHANGE UP
EOSINOPHIL # BLD AUTO: 0.15 K/UL — SIGNIFICANT CHANGE UP (ref 0–0.5)
EOSINOPHIL NFR BLD AUTO: 0.9 % — SIGNIFICANT CHANGE UP (ref 0–6)
GLUCOSE SERPL-MCNC: 114 MG/DL — HIGH (ref 70–99)
GLUCOSE SERPL-MCNC: 145 MG/DL — HIGH (ref 70–99)
GRAM STN FLD: SIGNIFICANT CHANGE UP
HCT VFR BLD CALC: 29.8 % — LOW (ref 39–50)
HGB BLD-MCNC: 9.6 G/DL — LOW (ref 13–17)
IMM GRANULOCYTES NFR BLD AUTO: 1.1 % — SIGNIFICANT CHANGE UP (ref 0–1.5)
LYMPHOCYTES # BLD AUTO: 12.6 % — LOW (ref 13–44)
LYMPHOCYTES # BLD AUTO: 2.06 K/UL — SIGNIFICANT CHANGE UP (ref 1–3.3)
MAGNESIUM SERPL-MCNC: 2.2 MG/DL — SIGNIFICANT CHANGE UP (ref 1.6–2.6)
MCHC RBC-ENTMCNC: 27.5 PG — SIGNIFICANT CHANGE UP (ref 27–34)
MCHC RBC-ENTMCNC: 32.2 GM/DL — SIGNIFICANT CHANGE UP (ref 32–36)
MCV RBC AUTO: 85.4 FL — SIGNIFICANT CHANGE UP (ref 80–100)
MONOCYTES # BLD AUTO: 1.78 K/UL — HIGH (ref 0–0.9)
MONOCYTES NFR BLD AUTO: 10.9 % — SIGNIFICANT CHANGE UP (ref 2–14)
NEUTROPHILS # BLD AUTO: 12.13 K/UL — HIGH (ref 1.8–7.4)
NEUTROPHILS NFR BLD AUTO: 74.3 % — SIGNIFICANT CHANGE UP (ref 43–77)
NRBC # BLD: 0 /100 WBCS — SIGNIFICANT CHANGE UP (ref 0–0)
PLATELET # BLD AUTO: 185 K/UL — SIGNIFICANT CHANGE UP (ref 150–400)
POTASSIUM SERPL-MCNC: 4.2 MMOL/L — SIGNIFICANT CHANGE UP (ref 3.5–5.3)
POTASSIUM SERPL-MCNC: 4.4 MMOL/L — SIGNIFICANT CHANGE UP (ref 3.5–5.3)
POTASSIUM SERPL-SCNC: 4.2 MMOL/L — SIGNIFICANT CHANGE UP (ref 3.5–5.3)
POTASSIUM SERPL-SCNC: 4.4 MMOL/L — SIGNIFICANT CHANGE UP (ref 3.5–5.3)
RBC # BLD: 3.49 M/UL — LOW (ref 4.2–5.8)
RBC # FLD: 13.8 % — SIGNIFICANT CHANGE UP (ref 10.3–14.5)
SODIUM SERPL-SCNC: 128 MMOL/L — LOW (ref 135–145)
SODIUM SERPL-SCNC: 130 MMOL/L — LOW (ref 135–145)
SPECIMEN SOURCE: SIGNIFICANT CHANGE UP
WBC # BLD: 16.33 K/UL — HIGH (ref 3.8–10.5)
WBC # FLD AUTO: 16.33 K/UL — HIGH (ref 3.8–10.5)

## 2020-03-09 PROCEDURE — 99024 POSTOP FOLLOW-UP VISIT: CPT

## 2020-03-09 PROCEDURE — 99233 SBSQ HOSP IP/OBS HIGH 50: CPT

## 2020-03-09 PROCEDURE — 99223 1ST HOSP IP/OBS HIGH 75: CPT | Mod: GC

## 2020-03-09 PROCEDURE — 71045 X-RAY EXAM CHEST 1 VIEW: CPT | Mod: 26

## 2020-03-09 RX ORDER — HYDROCORTISONE 1 %
1 OINTMENT (GRAM) TOPICAL AT BEDTIME
Refills: 0 | Status: DISCONTINUED | OUTPATIENT
Start: 2020-03-09 | End: 2020-03-10

## 2020-03-09 RX ORDER — CEFTRIAXONE 500 MG/1
INJECTION, POWDER, FOR SOLUTION INTRAMUSCULAR; INTRAVENOUS
Refills: 0 | Status: DISCONTINUED | OUTPATIENT
Start: 2020-03-09 | End: 2020-03-09

## 2020-03-09 RX ORDER — LEVALBUTEROL 1.25 MG/.5ML
0.63 SOLUTION, CONCENTRATE RESPIRATORY (INHALATION) EVERY 6 HOURS
Refills: 0 | Status: DISCONTINUED | OUTPATIENT
Start: 2020-03-09 | End: 2020-03-10

## 2020-03-09 RX ADMIN — LIDOCAINE 1 PATCH: 4 CREAM TOPICAL at 11:22

## 2020-03-09 RX ADMIN — LACTULOSE 10 GRAM(S): 10 SOLUTION ORAL at 09:10

## 2020-03-09 RX ADMIN — Medication 12.5 MILLIGRAM(S): at 07:01

## 2020-03-09 RX ADMIN — SODIUM CHLORIDE 3 MILLILITER(S): 9 INJECTION INTRAMUSCULAR; INTRAVENOUS; SUBCUTANEOUS at 21:11

## 2020-03-09 RX ADMIN — Medication 40 MILLIGRAM(S): at 07:00

## 2020-03-09 RX ADMIN — LIDOCAINE 1 PATCH: 4 CREAM TOPICAL at 18:39

## 2020-03-09 RX ADMIN — RANOLAZINE 500 MILLIGRAM(S): 500 TABLET, FILM COATED, EXTENDED RELEASE ORAL at 07:00

## 2020-03-09 RX ADMIN — LIDOCAINE 1 PATCH: 4 CREAM TOPICAL at 23:00

## 2020-03-09 RX ADMIN — POLYETHYLENE GLYCOL 3350 17 GRAM(S): 17 POWDER, FOR SOLUTION ORAL at 11:22

## 2020-03-09 RX ADMIN — Medication 81 MILLIGRAM(S): at 09:09

## 2020-03-09 RX ADMIN — Medication 20 MILLIGRAM(S): at 07:00

## 2020-03-09 RX ADMIN — AZITHROMYCIN 250 MILLIGRAM(S): 500 TABLET, FILM COATED ORAL at 09:18

## 2020-03-09 RX ADMIN — TRAMADOL HYDROCHLORIDE 25 MILLIGRAM(S): 50 TABLET ORAL at 11:32

## 2020-03-09 RX ADMIN — Medication 40 MILLIGRAM(S): at 09:09

## 2020-03-09 RX ADMIN — TIOTROPIUM BROMIDE 1 CAPSULE(S): 18 CAPSULE ORAL; RESPIRATORY (INHALATION) at 14:07

## 2020-03-09 RX ADMIN — Medication 0.5 MILLIGRAM(S): at 06:59

## 2020-03-09 RX ADMIN — SODIUM CHLORIDE 3 MILLILITER(S): 9 INJECTION INTRAMUSCULAR; INTRAVENOUS; SUBCUTANEOUS at 06:59

## 2020-03-09 RX ADMIN — ATORVASTATIN CALCIUM 40 MILLIGRAM(S): 80 TABLET, FILM COATED ORAL at 21:16

## 2020-03-09 RX ADMIN — TRAMADOL HYDROCHLORIDE 25 MILLIGRAM(S): 50 TABLET ORAL at 12:02

## 2020-03-09 RX ADMIN — SODIUM CHLORIDE 3 MILLILITER(S): 9 INJECTION INTRAMUSCULAR; INTRAVENOUS; SUBCUTANEOUS at 14:08

## 2020-03-09 RX ADMIN — HEPARIN SODIUM 5000 UNIT(S): 5000 INJECTION INTRAVENOUS; SUBCUTANEOUS at 06:59

## 2020-03-09 RX ADMIN — LEVALBUTEROL 0.63 MILLIGRAM(S): 1.25 SOLUTION, CONCENTRATE RESPIRATORY (INHALATION) at 23:45

## 2020-03-09 RX ADMIN — PANTOPRAZOLE SODIUM 40 MILLIGRAM(S): 20 TABLET, DELAYED RELEASE ORAL at 07:00

## 2020-03-09 RX ADMIN — LEVALBUTEROL 0.63 MILLIGRAM(S): 1.25 SOLUTION, CONCENTRATE RESPIRATORY (INHALATION) at 17:38

## 2020-03-09 RX ADMIN — Medication 0.5 MILLIGRAM(S): at 17:39

## 2020-03-09 RX ADMIN — Medication 12.5 MILLIGRAM(S): at 17:38

## 2020-03-09 RX ADMIN — Medication 1 APPLICATION(S): at 21:16

## 2020-03-09 RX ADMIN — RANOLAZINE 500 MILLIGRAM(S): 500 TABLET, FILM COATED, EXTENDED RELEASE ORAL at 17:40

## 2020-03-09 RX ADMIN — Medication 0.6 MILLIGRAM(S): at 09:10

## 2020-03-09 RX ADMIN — HEPARIN SODIUM 5000 UNIT(S): 5000 INJECTION INTRAVENOUS; SUBCUTANEOUS at 21:15

## 2020-03-09 RX ADMIN — HEPARIN SODIUM 5000 UNIT(S): 5000 INJECTION INTRAVENOUS; SUBCUTANEOUS at 14:35

## 2020-03-09 RX ADMIN — LEVALBUTEROL 0.63 MILLIGRAM(S): 1.25 SOLUTION, CONCENTRATE RESPIRATORY (INHALATION) at 09:18

## 2020-03-09 RX ADMIN — SENNA PLUS 2 TABLET(S): 8.6 TABLET ORAL at 21:16

## 2020-03-09 NOTE — DIETITIAN INITIAL EVALUATION ADULT. - OTHER INFO
84M resident of [place] and Azeri speaking, former smoker (30 pack years, quit 30 years ago) with PMHx of HTN and HLD who presented to the [place] ED on 3/1/2010 with c/o chest pain and palpitations that started while he was at home eating dinner, non-radiating and resolved with rest. His family brought him to the ED. Admitted for further monitoring and work up. Cardiac cath revealed 100%  distal LAD. ECHO showed moderate to severe MR and he was transferred to [facility] for further workup and management. On 3/5 underwent mitraclip c/b pericardial effusion requiring pericardial drain placement. Moved to 9L on POD3, now POD4 c/w wheezing. No noted n/v/d/c, chewing/ swallowing issues or pain impacting intake, skin is with incision. Fair intake noted at this time. Lethargic at time of visit, education left at bedside for review upon follow up. Will follow per protocol.

## 2020-03-09 NOTE — PROGRESS NOTE ADULT - SUBJECTIVE AND OBJECTIVE BOX
CTICU  CRITICAL  CARE  attending     Hand off received 					   Pertinent clinical, laboratory, radiographic, hemodynamic, echocardiographic, respiratory data, microbiologic data and chart were reviewed and analyzed frequently throughout the course of the day and night  Patient seen and examined with CTS/ SH attending at bedside  Pt is a 84y , Male, HEALTH ISSUES - PROBLEM Dx:      , FAMILY HISTORY:  No pertinent family history in first degree relatives  PAST MEDICAL & SURGICAL HISTORY:  Mitral regurgitation  HLD (hyperlipidemia)  HTN (hypertension)  S/P hernia surgery    Patient is a 84y old  Male who presents with a chief complaint of Mitral regurgitation (09 Mar 2020 10:54)      14 system review was unremarkable    Vital signs, hemodynamic and respiratory parameters were reviewed from the bedside nursing flowsheet.  ICU Vital Signs Last 24 Hrs  T(C): 36.6 (09 Mar 2020 09:00), Max: 36.8 (08 Mar 2020 14:10)  T(F): 97.8 (09 Mar 2020 09:00), Max: 98.2 (08 Mar 2020 14:10)  HR: 90 (09 Mar 2020 08:31) (90 - 110)  BP: 102/57 (09 Mar 2020 08:31) (95/65 - 135/73)  BP(mean): 73 (09 Mar 2020 08:31) (73 - 97)  ABP: --  ABP(mean): --  RR: 18 (09 Mar 2020 08:31) (18 - 22)  SpO2: 92% (09 Mar 2020 08:31) (92% - 95%)    Adult Advanced Hemodynamics Last 24 Hrs  CVP(mm Hg): --  CVP(cm H2O): --  CO: --  CI: --  PA: --  PA(mean): --  PCWP: --  SVR: --  SVRI: --  PVR: --  PVRI: --, ABG - ( 08 Mar 2020 03:41 )  pH, Arterial: 7.50  pH, Blood: x     /  pCO2: 34    /  pO2: 73    / HCO3: 26    / Base Excess: 2.9   /  SaO2: 96                  Intake and output was reviewed and the fluid balance was calculated  Daily     Daily   I&O's Summary    08 Mar 2020 07:01  -  09 Mar 2020 07:00  --------------------------------------------------------  IN: 180 mL / OUT: 2150 mL / NET: -1970 mL    09 Mar 2020 07:01  -  09 Mar 2020 12:06  --------------------------------------------------------  IN: 0 mL / OUT: 1000 mL / NET: -1000 mL        All lines and drain sites were assessed  Glycemic trend was reviewedCAPILLARY BLOOD GLUCOSE        No acute change in mental status  Auscultation of the chest reveals equal bs  Abdomen is soft  Extremities are warm and well perfused  Wounds appear clean and unremarkable  Antibiotics are periop    labs  CBC Full  -  ( 09 Mar 2020 05:51 )  WBC Count : 16.33 K/uL  RBC Count : 3.49 M/uL  Hemoglobin : 9.6 g/dL  Hematocrit : 29.8 %  Platelet Count - Automated : 185 K/uL  Mean Cell Volume : 85.4 fl  Mean Cell Hemoglobin : 27.5 pg  Mean Cell Hemoglobin Concentration : 32.2 gm/dL  Auto Neutrophil # : 12.13 K/uL  Auto Lymphocyte # : 2.06 K/uL  Auto Monocyte # : 1.78 K/uL  Auto Eosinophil # : 0.15 K/uL  Auto Basophil # : 0.03 K/uL  Auto Neutrophil % : 74.3 %  Auto Lymphocyte % : 12.6 %  Auto Monocyte % : 10.9 %  Auto Eosinophil % : 0.9 %  Auto Basophil % : 0.2 %    03-09    130<L>  |  95<L>  |  27<H>  ----------------------------<  114<H>  4.2   |  24  |  1.39<H>    Ca    8.1<L>      09 Mar 2020 05:51  Phos  3.2     03-08  Mg     2.2     03-09    TPro  5.7<L>  /  Alb  3.1<L>  /  TBili  0.8  /  DBili  x   /  AST  17  /  ALT  13  /  AlkPhos  53  03-08    PT/INR - ( 08 Mar 2020 03:42 )   PT: 12.4 sec;   INR: 1.08          PTT - ( 08 Mar 2020 03:42 )  PTT:32.7 sec  The current medications were reviewed   MEDICATIONS  (STANDING):  aspirin enteric coated 81 milliGRAM(s) Oral daily  atorvastatin 40 milliGRAM(s) Oral at bedtime  buDESOnide    Inhalation Suspension 0.5 milliGRAM(s) Inhalation every 12 hours  colchicine 0.6 milliGRAM(s) Oral daily  furosemide    Tablet 40 milliGRAM(s) Oral daily  heparin  Injectable 5000 Unit(s) SubCutaneous every 8 hours  lactulose Syrup 10 Gram(s) Oral daily  levoFLOXacin IVPB 750 milliGRAM(s) IV Intermittent every 48 hours  lidocaine   Patch 1 Patch Transdermal daily  metoprolol tartrate 12.5 milliGRAM(s) Oral every 12 hours  pantoprazole    Tablet 40 milliGRAM(s) Oral before breakfast  polyethylene glycol 3350 17 Gram(s) Oral daily  ranolazine 500 milliGRAM(s) Oral two times a day  senna 2 Tablet(s) Oral at bedtime  sodium chloride 0.9% lock flush 3 milliLiter(s) IV Push every 8 hours  tiotropium 18 MICROgram(s) Capsule 1 Capsule(s) Inhalation daily    MEDICATIONS  (PRN):  acetaminophen   Tablet .. 650 milliGRAM(s) Oral every 6 hours PRN Mild Pain (1 - 3)  hydrocodone/homatropine Syrup 5 milliLiter(s) Oral every 6 hours PRN Cough  levalbuterol Inhalation 0.63 milliGRAM(s) Inhalation every 6 hours PRN shortness of breath  magnesium hydroxide Suspension 30 milliLiter(s) Oral daily PRN Constipation  traMADol 25 milliGRAM(s) Oral every 6 hours PRN Severe Pain (7 - 10)       PROBLEM LIST/ ASSESSMENT:  HEALTH ISSUES - PROBLEM Dx:      ,   Patient is a 84y old  Male who presents with a chief complaint of Mitral regurgitation (09 Mar 2020 10:54)     s/p mitraclip    acute exacerbation of bronchitis              My plan includes :  close hemodynamic, ventilatory and drain monitoring and management per post op routine    Monitor for arrhythmias and monitor parameters for organ perfusion  beta blockade not administered due to hemodynamic instability and bradycardia  monitor neurologic status  Head of the bed should remain elevated to 45 deg .   chest PT and IS will be encouraged  monitor adequacy of oxygenation and ventilation and attempt to wean oxygen  antibiotic regimen will be tailored to the clinical, laboratory and microbiologic data  Nutritional goals will be met using po eventually , ensure adequate caloric intake and montior the same  Stress ulcer and VTE prophylaxis will be achieved    Glycemic control is satisfactory  Electrolytes have been repleted as necessary and wound care has been carried out. Pain control has been achieved.   agressive physical therapy and early mobility and ambulation goals will be met   The family was updated about the course and plan  CRITICAL CARE TIME personally provided by me  in evaluation and management, reassessments, review and interpretation of labs and x-rays, ventilator and hemodynamic management, formulating a plan and coordinating care: ___90____ MIN.  Time does not include procedural time. Time spent was non routine post-operarive caRE and included multiple and repeated evaluations at the bedside  CTICU ATTENDING     					    Bipin Villagomez MD

## 2020-03-09 NOTE — PROGRESS NOTE ADULT - ASSESSMENT
This is an 85 y/o male, resident of [place] and Macedonian speaking, former smoker (30 pack years, quit 30 years ago) with PMHx of HTN and HLD who presented to the [place] ED on 3/1/2010 with c/o chest pain and palpitations that started while he was at home eating dinner, non-radiating and resolved with rest. His family brought him to the ED. Upon arrival VS stable and serial Troponin negative. He was admitted for further monitoring and work up. Cardiac cath revealed 100%  distal LAD. ECHO showed moderate to severe MR and he was transferred to [facility] under Dr. Canas for further workup and management.  On 3/5 pt underwent mitraclip. Procedure complicated by pericardial effusion requiring pericardial drain placement. He was transferred to the CTICU intubated. He was extubated to NC on POD 0. On POD 1 pressors were weaned off and pt was transitioned to NC. POD 1 echocardiogram showed moderate to severe MR . Pericardial drain was removed. Pt also had post-operative afib which converted back to NSR with amiodarone bolus. POD 3 he was stepped down to 9lachman.     Plan:    Neurovascular: Stable  -Pain well controlled with current regimen: PRN's:    Cardiovascular:   -Hemodynamically stable.   -Monitor: BP, HR, tele    Respiratory:   -Oxygentating well on room air  -Encourage continued use of IS 10x/hr and frequent ambulation  -CXR:    GI:  -GI PPX:  -PO Diet  -Bowel Regimen:     Renal / :  -Continue to monitor renal function: BUN/Cr  -Monitor I/O's daily     Endocrine:  no hx of DM or thyroid dx  -A1c:  -TSH:    Hematologic:  -CBC: H/H-  -Coagulation Panel.    ID:  -Temperature:   -CBC: WBC-  -Continue to observe for SIRS/Sepsis Syndrome.    Prophylaxis:  -DVT prophylaxis with 5000 SubQ Heparin q8h.  -Continue with SCD's b/l while patient is at rest     Disposition:  -Discharge home once patient is medically ready This is an 83 y/o male, resident of [place] and Hebrew speaking, former smoker (30 pack years, quit 30 years ago) with PMHx of HTN and HLD who presented to the [place] ED on 3/1/2010 with c/o chest pain and palpitations that started while he was at home eating dinner, non-radiating and resolved with rest. His family brought him to the ED. Upon arrival VS stable and serial Troponin negative. He was admitted for further monitoring and work up. Cardiac cath revealed 100%  distal LAD. ECHO showed moderate to severe MR and he was transferred to [facility] under Dr. Canas for further workup and management.      On 3/5/2020 pt underwent Mitraclip. Procedure complicated by pericardial effusion requiring pericardial drain placement. He was transferred to the CTICU intubated. He was extubated to HFNC on POD 0. On POD 1 pressors were weaned off and pt was transitioned to NC. POD 1 ECHO showed moderate to severe MR. Pericardial drain was removed. Pt also had post-operative afib which converted back to NSR with amiodarone bolus. POD 3 he was stepped down to 9lachman. on POD 4 pt remains with wheezing throughout auscultation and Dr. Landrum (pulm) consulted and rec c/w current regimen.     Plan:  Neurovascular: Stable  -Pain well controlled with current regimen: acetaminophen, lidocaine patch     Cardiovascular: POD#3 Mitraclip   -Hemodynamically stable.   -Monitor: BP, HR, tele  -Mitraclip: complicated by pericardial effusion. Echo (3/6) showing residual moderate/severe MR and flail mitral leaflet. No pericardial effusion. Echo (3/7) hyperdynamic ventricle EF >75%, no pericardial effusion   -Post-op AF-- s/p amio bolus now in NSR   -c/w asa, BB, statin   -c/w colchicine x2 weeks   -c/w ranolazine as needed for angina     Respiratory:   -Oxygenating well on 3lpm via NC  -Encourage continued use of IS 10x/hr and frequent ambulation  -Tracheobronchitis: Pulm consulted, appreciate recs. Dr. Landrum on board: c/w with home doses prednisone, levaquin, and atrovent.   -CXR: right lower lobe consoliation. No PTX   -US yesterday showing atelectasis   -c/w with prednisone 10mg daily     GI:  -GI PPX: pantoprazole   -PO Diet: DASH/TLC  -Bowel Regimen: senna, lactulose     Renal / :   -Continue to monitor renal function: BUN/Cr 27/1.39 (trend daily)  -Monitor I/O's daily   -c/w lasix 40 daily     Endocrine:  no hx of DM or thyroid dx  -A1c: 5.4  -TSH: 1.718     Hematologic:  -CBC: H/H- 9.6/29.8    ID:  -Temperature: afebrile   -CBC: WBC- 16.3   -Continue to observe for SIRS/Sepsis Syndrome.  -Tracheobronchitis: added levaquin today, c/w abx     Prophylaxis:  -DVT prophylaxis with 5000 SubQ Heparin q8h.  -Continue with SCD's b/l while patient is at rest     Disposition:  -Discharge home once patient is medically ready

## 2020-03-09 NOTE — DIETITIAN INITIAL EVALUATION ADULT. - ADD RECOMMEND
1. Encourage intake through day 2. Manage pain prn 3. Monitor and replete lytes 4. Trend wts 5. RD to reinforce education upon f/u

## 2020-03-09 NOTE — CONSULT NOTE ADULT - PROBLEM SELECTOR RECOMMENDATION 2
most likely related tpo fluid overload and pneumonia.  He does not have history fo COPD nor asthma.  Change to IV steroids, xopenix to ATC and continue inhaled steroids

## 2020-03-09 NOTE — CONSULT NOTE ADULT - SUBJECTIVE AND OBJECTIVE BOX
Patient is a 84y old  Male who presents with a chief complaint of Mitral regurgitation (09 Mar 2020 12:05)      HPI:  This is an 85 y/o male, resident of [place] and Hungarian speaking, former smoker (30 pack years, quit 30 years ago) with PMHx of HTN and HLD who presented to the [place] ED on 3/1/2010 with c/o chest pain and palpitations that started while he was at home eating dinner, non-radiating and resolved with rest.  He's had palpitations in the past, but never this severe.  His symptoms this time were associated with chest pain so his family brought him to the ED where VS stable and serial Troponin negative.  He is followed by Dr. Lira and has been planned for cardiac cath this month for recurrent episodes of stable angina and underwent cardiac cath during his admission which revealed 100%  distal LAD. ECHO was performed which revealed moderate to severe MR and he was referred to Dr. Canas for procedural evaluation, transferred to [facility] on 3/4/20 under his care.  On admission, patient without complaints.  Denies HA, AMS, CP, palpitations, SOB, cough, hemoptysis, n/v/d, fever. (04 Mar 2020 17:08)      PAST MEDICAL & SURGICAL HISTORY:  Mitral regurgitation  HLD (hyperlipidemia)  HTN (hypertension)  S/P hernia surgery      FAMILY HISTORY:  No pertinent family history in first degree relatives      SOCIAL HISTORY:  Smoking Status: [ ] Current, [ ] Former, [ ] Never  Pack Years:    MEDICATIONS:  Pulmonary:  buDESOnide    Inhalation Suspension 0.5 milliGRAM(s) Inhalation every 12 hours  hydrocodone/homatropine Syrup 5 milliLiter(s) Oral every 6 hours PRN  levalbuterol Inhalation 0.63 milliGRAM(s) Inhalation every 6 hours PRN  tiotropium 18 MICROgram(s) Capsule 1 Capsule(s) Inhalation daily    Antimicrobials:  levoFLOXacin IVPB 750 milliGRAM(s) IV Intermittent every 48 hours    Anticoagulants:  aspirin enteric coated 81 milliGRAM(s) Oral daily  heparin  Injectable 5000 Unit(s) SubCutaneous every 8 hours    Onc:    GI/:  lactulose Syrup 10 Gram(s) Oral daily  magnesium hydroxide Suspension 30 milliLiter(s) Oral daily PRN  pantoprazole    Tablet 40 milliGRAM(s) Oral before breakfast  polyethylene glycol 3350 17 Gram(s) Oral daily  senna 2 Tablet(s) Oral at bedtime    Endocrine:  atorvastatin 40 milliGRAM(s) Oral at bedtime  colchicine 0.6 milliGRAM(s) Oral daily    Cardiac:  metoprolol tartrate 12.5 milliGRAM(s) Oral every 12 hours  ranolazine 500 milliGRAM(s) Oral two times a day    Other Medications:  acetaminophen   Tablet .. 650 milliGRAM(s) Oral every 6 hours PRN  hydrocortisone 2.5% Lotion 1 Application(s) Topical at bedtime  lidocaine   Patch 1 Patch Transdermal daily  sodium chloride 0.9% lock flush 3 milliLiter(s) IV Push every 8 hours  traMADol 25 milliGRAM(s) Oral every 6 hours PRN      Allergies    penicillin (Unknown)    Intolerances        Review of Systems:   •	General: negative  •	Skin/Breast: negative  •	Ophthalmologic: negative  •	ENMT: negative  •	Respiratory and Thorax: cough and wheezing  •	Cardiovascular: negative  •	Gastrointestinal: negative  •	Genitourinary: negative  •	Musculoskeletal: negative  •	Neurological: negative  •	Psychiatric: negative  •	Hematology/Lymphatics: negative  •	Endocrine: negative  •	Allergic/Immunologic: negative    Physical Exam:   • Constitutional:	Well-developed, well nourished  • Eyes:	EOMI; PERRL; no drainage or redness  • ENMT:	No oral lesions; no gross abnormalities  • Neck	No bruits; no thyromegaly or nodules  • Breasts:	not examined  • Back:	No deformity or limitation of movement  • Respiratory:	Breath Sounds equal & clear to percussion & exp wheezeauscultation, no accessory muscle use  • Cardiovascular:	Regular rate & rhythm, normal S1, S2; no murmurs, gallops or rubs; no S3, S4  • Gastrointestinal:	Soft, non-tender, no hepatosplenomegaly, normal bowel sounds  • Genitourinary:	not examined  • Rectal: not examined  • Extremities:	No cyanosis, clubbing or edema  • Vascular:	Equal and normal pulses (carotid, femoral, dorsalis pedis)  • Neurologica:l	not examined  • Skin:	No lesions; no rash  • Lymph Nodes:	No lymphadedenopathy  • Musculoskeletal:	No joint pain, swelling or deformity; no limitation of movement      Vital Signs Last 24 Hrs  T(C): 36.9 (09 Mar 2020 17:31), Max: 36.9 (09 Mar 2020 17:31)  T(F): 98.4 (09 Mar 2020 17:31), Max: 98.4 (09 Mar 2020 17:31)  HR: 101 (09 Mar 2020 21:00) (90 - 101)  BP: 136/64 (09 Mar 2020 21:00) (102/57 - 136/64)  BP(mean): 92 (09 Mar 2020 21:00) (73 - 99)  RR: 20 (09 Mar 2020 21:00) (18 - 20)  SpO2: 95% (09 Mar 2020 21:00) (92% - 95%)    03-08 @ 07:01  -  03-09 @ 07:00  --------------------------------------------------------  IN: 180 mL / OUT: 2150 mL / NET: -1970 mL    03-09 @ 07:01  -  03-09 @ 21:36  --------------------------------------------------------  IN: 0 mL / OUT: 2400 mL / NET: -2400 mL          LABS:  ABG - ( 08 Mar 2020 03:41 )  pH, Arterial: 7.50  pH, Blood: x     /  pCO2: 34    /  pO2: 73    / HCO3: 26    / Base Excess: 2.9   /  SaO2: 96          CXR RLL infilltrate        CBC Full  -  ( 09 Mar 2020 05:51 )  WBC Count : 16.33 K/uL  RBC Count : 3.49 M/uL  Hemoglobin : 9.6 g/dL  Hematocrit : 29.8 %  Platelet Count - Automated : 185 K/uL  Mean Cell Volume : 85.4 fl  Mean Cell Hemoglobin : 27.5 pg  Mean Cell Hemoglobin Concentration : 32.2 gm/dL  Auto Neutrophil # : 12.13 K/uL  Auto Lymphocyte # : 2.06 K/uL  Auto Monocyte # : 1.78 K/uL  Auto Eosinophil # : 0.15 K/uL  Auto Basophil # : 0.03 K/uL  Auto Neutrophil % : 74.3 %  Auto Lymphocyte % : 12.6 %  Auto Monocyte % : 10.9 %  Auto Eosinophil % : 0.9 %  Auto Basophil % : 0.2 %    03-09    128<L>  |  90<L>  |  32<H>  ----------------------------<  145<H>  4.4   |  23  |  1.29    Ca    8.6      09 Mar 2020 18:52  Phos  3.2     03-08  Mg     2.2     03-09    TPro  5.7<L>  /  Alb  3.1<L>  /  TBili  0.8  /  DBili  x   /  AST  17  /  ALT  13  /  AlkPhos  53  03-08    PT/INR - ( 08 Mar 2020 03:42 )   PT: 12.4 sec;   INR: 1.08          PTT - ( 08 Mar 2020 03:42 )  PTT:32.7 sec                  RADIOLOGY & ADDITIONAL STUDIES (The following images were personally reviewed):

## 2020-03-09 NOTE — DIETITIAN INITIAL EVALUATION ADULT. - ENERGY NEEDS
ABW used for calculations as pt between % of IBW.   ABW 58.4kg, IBW 56kg, 103% IBW, ht 63", BMI 22.8   Nutrient needs based on [facility] standards of care for maintenance in adults, adjusted for post-op needs, fluid per team

## 2020-03-09 NOTE — PROGRESS NOTE ADULT - SUBJECTIVE AND OBJECTIVE BOX
Patient discussed on morning rounds with Dr. Canas     Operation / Date:  3/5/2020 - Transfemoral MitraClip     SUBJECTIVE ASSESSMENT:  Pt feeling well this morning but does admit he still has some difficulty breathing. He feels he is wheezing. Eating/drinking well. Moving his bowels regularly. Denies any CP, palp, abd pain, n/d/v/c, fevers or chills.       Vital Signs Last 24 Hrs  T(C): 36.6 (09 Mar 2020 09:00), Max: 36.8 (08 Mar 2020 14:10)  T(F): 97.8 (09 Mar 2020 09:00), Max: 98.2 (08 Mar 2020 14:10)  HR: 90 (09 Mar 2020 08:31) (90 - 110)  BP: 102/57 (09 Mar 2020 08:31) (95/65 - 135/73)  BP(mean): 73 (09 Mar 2020 08:31) (73 - 97)  RR: 18 (09 Mar 2020 08:31) (18 - 22)  SpO2: 92% (09 Mar 2020 08:31) (92% - 95%)  I&O's Detail    08 Mar 2020 07:01  -  09 Mar 2020 07:00  --------------------------------------------------------  IN:    Oral Fluid: 180 mL  Total IN: 180 mL    OUT:    Voided: 2150 mL  Total OUT: 2150 mL    Total NET: -1970 mL      CHEST TUBE:  no  GILLIAN DRAIN:  no  EPICARDIAL WIRES: no  TIE DOWNS: no  MEDEIROS: no    PHYSICAL EXAM:  General: well appearing sitting up in chair in NAD   Neurological: AOx3. Motor skills grossly intact  Cardiovascular: Normal S1/S2. Regular rate/rhythm. No murmurs  Respiratory: b/l wheezing heard throughout auscultation of the chest. No crackles   Gastrointestinal: +BS in all 4 quadrants. Non-distended. Soft. Non-tender  Extremities: Strength 5/5 b/l upper/lower extremities. Sensation grossly intact upper/lower extremities. No edema. No calf tenderness.  Vascular: Radial 2+bilaterally, DP 2+ b/l  Incision Sites: B/l groins no hematoma, no ecchymosis, no bruits.       LABS:                        9.6    16.33 )-----------( 185      ( 09 Mar 2020 05:51 )             29.8       COUMADIN: no    PT/INR - ( 08 Mar 2020 03:42 )   PT: 12.4 sec;   INR: 1.08          PTT - ( 08 Mar 2020 03:42 )  PTT:32.7 sec    03-09    130<L>  |  95<L>  |  27<H>  ----------------------------<  114<H>  4.2   |  24  |  1.39<H>    Ca    8.1<L>      09 Mar 2020 05:51  Phos  3.2     03-08  Mg     2.2     03-09    TPro  5.7<L>  /  Alb  3.1<L>  /  TBili  0.8  /  DBili  x   /  AST  17  /  ALT  13  /  AlkPhos  53  03-08          MEDICATIONS  (STANDING):  aspirin enteric coated 81 milliGRAM(s) Oral daily  atorvastatin 40 milliGRAM(s) Oral at bedtime  buDESOnide    Inhalation Suspension 0.5 milliGRAM(s) Inhalation every 12 hours  colchicine 0.6 milliGRAM(s) Oral daily  furosemide    Tablet 40 milliGRAM(s) Oral daily  heparin  Injectable 5000 Unit(s) SubCutaneous every 8 hours  lactulose Syrup 10 Gram(s) Oral daily  levoFLOXacin IVPB 750 milliGRAM(s) IV Intermittent every 48 hours  lidocaine   Patch 1 Patch Transdermal daily  metoprolol tartrate 12.5 milliGRAM(s) Oral every 12 hours  pantoprazole    Tablet 40 milliGRAM(s) Oral before breakfast  polyethylene glycol 3350 17 Gram(s) Oral daily  ranolazine 500 milliGRAM(s) Oral two times a day  senna 2 Tablet(s) Oral at bedtime  sodium chloride 0.9% lock flush 3 milliLiter(s) IV Push every 8 hours  tiotropium 18 MICROgram(s) Capsule 1 Capsule(s) Inhalation daily    MEDICATIONS  (PRN):  acetaminophen   Tablet .. 650 milliGRAM(s) Oral every 6 hours PRN Mild Pain (1 - 3)  hydrocodone/homatropine Syrup 5 milliLiter(s) Oral every 6 hours PRN Cough  levalbuterol Inhalation 0.63 milliGRAM(s) Inhalation every 6 hours PRN shortness of breath  magnesium hydroxide Suspension 30 milliLiter(s) Oral daily PRN Constipation  traMADol 25 milliGRAM(s) Oral every 6 hours PRN Severe Pain (7 - 10)        RADIOLOGY & ADDITIONAL TESTS:  < from: Xray Chest 1 View AP/PA (03.09.20 @ 05:46) >  Findings/  impression: Interim removal of right internal jugular line. Heart size within normal limits. Mari clip. Bilateral opacities/pleural effusions, stable. Stable bony structures.  < end of copied text > Patient discussed on morning rounds with Dr. Canas     Operation / Date:  3/5/2020 - Transfemoral MitraClip     SUBJECTIVE ASSESSMENT:  Pt feeling well this morning but does admit he still has some difficulty breathing. He feels he is wheezing. Eating/drinking well. Moving his bowels regularly. Denies any CP, palp, abd pain, n/d/v/c, fevers or chills.     Vital Signs Last 24 Hrs  T(C): 36.6 (09 Mar 2020 09:00), Max: 36.8 (08 Mar 2020 14:10)  T(F): 97.8 (09 Mar 2020 09:00), Max: 98.2 (08 Mar 2020 14:10)  HR: 90 (09 Mar 2020 08:31) (90 - 110)  BP: 102/57 (09 Mar 2020 08:31) (95/65 - 135/73)  BP(mean): 73 (09 Mar 2020 08:31) (73 - 97)  RR: 18 (09 Mar 2020 08:31) (18 - 22)  SpO2: 92% (09 Mar 2020 08:31) (92% - 95%)  I&O's Detail    08 Mar 2020 07:01  -  09 Mar 2020 07:00  --------------------------------------------------------  IN:    Oral Fluid: 180 mL  Total IN: 180 mL    OUT:    Voided: 2150 mL  Total OUT: 2150 mL    Total NET: -1970 mL      CHEST TUBE:  no  GILLIAN DRAIN:  no  EPICARDIAL WIRES: no  TIE DOWNS: no  MEDEIROS: no    PHYSICAL EXAM:  General: well appearing sitting up in chair in NAD   Neurological: AOx3. Motor skills grossly intact  Cardiovascular: Normal S1/S2. Regular rate/rhythm. No murmurs  Respiratory: b/l wheezing heard throughout auscultation of the chest. No crackles   Gastrointestinal: +BS in all 4 quadrants. Non-distended. Soft. Non-tender  Extremities: Strength 5/5 b/l upper/lower extremities. Sensation grossly intact upper/lower extremities. No edema. No calf tenderness.  Vascular: Radial 2+bilaterally, DP 2+ b/l  Incision Sites: B/l groins no hematoma, no ecchymosis, no bruits.       LABS:                        9.6    16.33 )-----------( 185      ( 09 Mar 2020 05:51 )             29.8       COUMADIN: no    PT/INR - ( 08 Mar 2020 03:42 )   PT: 12.4 sec;   INR: 1.08          PTT - ( 08 Mar 2020 03:42 )  PTT:32.7 sec    03-09    130<L>  |  95<L>  |  27<H>  ----------------------------<  114<H>  4.2   |  24  |  1.39<H>    Ca    8.1<L>      09 Mar 2020 05:51  Phos  3.2     03-08  Mg     2.2     03-09    TPro  5.7<L>  /  Alb  3.1<L>  /  TBili  0.8  /  DBili  x   /  AST  17  /  ALT  13  /  AlkPhos  53  03-08          MEDICATIONS  (STANDING):  aspirin enteric coated 81 milliGRAM(s) Oral daily  atorvastatin 40 milliGRAM(s) Oral at bedtime  buDESOnide    Inhalation Suspension 0.5 milliGRAM(s) Inhalation every 12 hours  colchicine 0.6 milliGRAM(s) Oral daily  furosemide    Tablet 40 milliGRAM(s) Oral daily  heparin  Injectable 5000 Unit(s) SubCutaneous every 8 hours  lactulose Syrup 10 Gram(s) Oral daily  levoFLOXacin IVPB 750 milliGRAM(s) IV Intermittent every 48 hours  lidocaine   Patch 1 Patch Transdermal daily  metoprolol tartrate 12.5 milliGRAM(s) Oral every 12 hours  pantoprazole    Tablet 40 milliGRAM(s) Oral before breakfast  polyethylene glycol 3350 17 Gram(s) Oral daily  ranolazine 500 milliGRAM(s) Oral two times a day  senna 2 Tablet(s) Oral at bedtime  sodium chloride 0.9% lock flush 3 milliLiter(s) IV Push every 8 hours  tiotropium 18 MICROgram(s) Capsule 1 Capsule(s) Inhalation daily    MEDICATIONS  (PRN):  acetaminophen   Tablet .. 650 milliGRAM(s) Oral every 6 hours PRN Mild Pain (1 - 3)  hydrocodone/homatropine Syrup 5 milliLiter(s) Oral every 6 hours PRN Cough  levalbuterol Inhalation 0.63 milliGRAM(s) Inhalation every 6 hours PRN shortness of breath  magnesium hydroxide Suspension 30 milliLiter(s) Oral daily PRN Constipation  traMADol 25 milliGRAM(s) Oral every 6 hours PRN Severe Pain (7 - 10)        RADIOLOGY & ADDITIONAL TESTS:  < from: Xray Chest 1 View AP/PA (03.09.20 @ 05:46) >  Findings/  impression: Interim removal of right internal jugular line. Heart size within normal limits. Mari clip. Bilateral opacities/pleural effusions, stable. Stable bony structures.  < end of copied text >

## 2020-03-10 ENCOUNTER — TRANSCRIPTION ENCOUNTER (OUTPATIENT)
Age: 85
End: 2020-03-10

## 2020-03-10 VITALS
RESPIRATION RATE: 18 BRPM | OXYGEN SATURATION: 94 % | DIASTOLIC BLOOD PRESSURE: 66 MMHG | SYSTOLIC BLOOD PRESSURE: 110 MMHG | HEART RATE: 95 BPM

## 2020-03-10 PROBLEM — I34.0 NONRHEUMATIC MITRAL (VALVE) INSUFFICIENCY: Chronic | Status: ACTIVE | Noted: 2020-03-04

## 2020-03-10 LAB
ANION GAP SERPL CALC-SCNC: 13 MMOL/L — SIGNIFICANT CHANGE UP (ref 5–17)
ANION GAP SERPL CALC-SCNC: 13 MMOL/L — SIGNIFICANT CHANGE UP (ref 5–17)
ANION GAP SERPL CALC-SCNC: 17 MMOL/L — SIGNIFICANT CHANGE UP (ref 5–17)
BUN SERPL-MCNC: 29 MG/DL — HIGH (ref 7–23)
BUN SERPL-MCNC: 30 MG/DL — HIGH (ref 7–23)
BUN SERPL-MCNC: 33 MG/DL — HIGH (ref 7–23)
CALCIUM SERPL-MCNC: 8.5 MG/DL — SIGNIFICANT CHANGE UP (ref 8.4–10.5)
CALCIUM SERPL-MCNC: 8.5 MG/DL — SIGNIFICANT CHANGE UP (ref 8.4–10.5)
CALCIUM SERPL-MCNC: 8.8 MG/DL — SIGNIFICANT CHANGE UP (ref 8.4–10.5)
CHLORIDE SERPL-SCNC: 91 MMOL/L — LOW (ref 96–108)
CHLORIDE SERPL-SCNC: 92 MMOL/L — LOW (ref 96–108)
CHLORIDE SERPL-SCNC: 96 MMOL/L — SIGNIFICANT CHANGE UP (ref 96–108)
CO2 SERPL-SCNC: 21 MMOL/L — LOW (ref 22–31)
CO2 SERPL-SCNC: 23 MMOL/L — SIGNIFICANT CHANGE UP (ref 22–31)
CO2 SERPL-SCNC: 23 MMOL/L — SIGNIFICANT CHANGE UP (ref 22–31)
CREAT SERPL-MCNC: 1.22 MG/DL — SIGNIFICANT CHANGE UP (ref 0.5–1.3)
CREAT SERPL-MCNC: 1.29 MG/DL — SIGNIFICANT CHANGE UP (ref 0.5–1.3)
CREAT SERPL-MCNC: 1.43 MG/DL — HIGH (ref 0.5–1.3)
CULTURE RESULTS: SIGNIFICANT CHANGE UP
GLUCOSE SERPL-MCNC: 110 MG/DL — HIGH (ref 70–99)
GLUCOSE SERPL-MCNC: 130 MG/DL — HIGH (ref 70–99)
GLUCOSE SERPL-MCNC: 132 MG/DL — HIGH (ref 70–99)
GRAM STN FLD: SIGNIFICANT CHANGE UP
HCT VFR BLD CALC: 30.4 % — LOW (ref 39–50)
HGB BLD-MCNC: 10.1 G/DL — LOW (ref 13–17)
MAGNESIUM SERPL-MCNC: 2.3 MG/DL — SIGNIFICANT CHANGE UP (ref 1.6–2.6)
MCHC RBC-ENTMCNC: 27.6 PG — SIGNIFICANT CHANGE UP (ref 27–34)
MCHC RBC-ENTMCNC: 33.2 GM/DL — SIGNIFICANT CHANGE UP (ref 32–36)
MCV RBC AUTO: 83.1 FL — SIGNIFICANT CHANGE UP (ref 80–100)
NRBC # BLD: 0 /100 WBCS — SIGNIFICANT CHANGE UP (ref 0–0)
PLATELET # BLD AUTO: 228 K/UL — SIGNIFICANT CHANGE UP (ref 150–400)
POTASSIUM SERPL-MCNC: 3.9 MMOL/L — SIGNIFICANT CHANGE UP (ref 3.5–5.3)
POTASSIUM SERPL-MCNC: 3.9 MMOL/L — SIGNIFICANT CHANGE UP (ref 3.5–5.3)
POTASSIUM SERPL-MCNC: 4.2 MMOL/L — SIGNIFICANT CHANGE UP (ref 3.5–5.3)
POTASSIUM SERPL-SCNC: 3.9 MMOL/L — SIGNIFICANT CHANGE UP (ref 3.5–5.3)
POTASSIUM SERPL-SCNC: 3.9 MMOL/L — SIGNIFICANT CHANGE UP (ref 3.5–5.3)
POTASSIUM SERPL-SCNC: 4.2 MMOL/L — SIGNIFICANT CHANGE UP (ref 3.5–5.3)
RBC # BLD: 3.66 M/UL — LOW (ref 4.2–5.8)
RBC # FLD: 13.6 % — SIGNIFICANT CHANGE UP (ref 10.3–14.5)
SODIUM SERPL-SCNC: 128 MMOL/L — LOW (ref 135–145)
SODIUM SERPL-SCNC: 129 MMOL/L — LOW (ref 135–145)
SODIUM SERPL-SCNC: 132 MMOL/L — LOW (ref 135–145)
SPECIMEN SOURCE: SIGNIFICANT CHANGE UP
WBC # BLD: 15.93 K/UL — HIGH (ref 3.8–10.5)
WBC # FLD AUTO: 15.93 K/UL — HIGH (ref 3.8–10.5)

## 2020-03-10 PROCEDURE — 71045 X-RAY EXAM CHEST 1 VIEW: CPT | Mod: 26

## 2020-03-10 PROCEDURE — 99232 SBSQ HOSP IP/OBS MODERATE 35: CPT | Mod: GC

## 2020-03-10 PROCEDURE — 99024 POSTOP FOLLOW-UP VISIT: CPT

## 2020-03-10 RX ORDER — TRAMADOL HYDROCHLORIDE 50 MG/1
0.5 TABLET ORAL
Qty: 60 | Refills: 0
Start: 2020-03-10 | End: 2020-04-08

## 2020-03-10 RX ORDER — SODIUM CHLORIDE 9 MG/ML
1000 INJECTION INTRAMUSCULAR; INTRAVENOUS; SUBCUTANEOUS
Refills: 0 | Status: DISCONTINUED | OUTPATIENT
Start: 2020-03-10 | End: 2020-03-10

## 2020-03-10 RX ORDER — ASPIRIN/CALCIUM CARB/MAGNESIUM 324 MG
1 TABLET ORAL
Qty: 30 | Refills: 0
Start: 2020-03-10 | End: 2020-04-08

## 2020-03-10 RX ORDER — BUDESONIDE, MICRONIZED 100 %
2 POWDER (GRAM) MISCELLANEOUS
Qty: 30 | Refills: 0
Start: 2020-03-10 | End: 2020-04-08

## 2020-03-10 RX ORDER — COLCHICINE 0.6 MG
1 TABLET ORAL
Qty: 9 | Refills: 0
Start: 2020-03-10 | End: 2020-03-18

## 2020-03-10 RX ORDER — DILTIAZEM HCL 120 MG
120 CAPSULE, EXT RELEASE 24 HR ORAL DAILY
Refills: 0 | Status: DISCONTINUED | OUTPATIENT
Start: 2020-03-10 | End: 2020-03-10

## 2020-03-10 RX ORDER — SENNA PLUS 8.6 MG/1
1 TABLET ORAL
Qty: 0 | Refills: 0 | DISCHARGE

## 2020-03-10 RX ORDER — ATORVASTATIN CALCIUM 80 MG/1
1 TABLET, FILM COATED ORAL
Qty: 30 | Refills: 0
Start: 2020-03-10 | End: 2020-04-08

## 2020-03-10 RX ORDER — HYDROCORTISONE 1 %
1 OINTMENT (GRAM) TOPICAL
Qty: 1 | Refills: 0
Start: 2020-03-10 | End: 2020-04-08

## 2020-03-10 RX ORDER — SENNA PLUS 8.6 MG/1
1 TABLET ORAL
Qty: 14 | Refills: 0
Start: 2020-03-10 | End: 2020-03-23

## 2020-03-10 RX ORDER — ALBUMIN HUMAN 25 %
250 VIAL (ML) INTRAVENOUS ONCE
Refills: 0 | Status: COMPLETED | OUTPATIENT
Start: 2020-03-10 | End: 2020-03-10

## 2020-03-10 RX ORDER — LISINOPRIL 2.5 MG/1
1 TABLET ORAL
Qty: 0 | Refills: 0 | DISCHARGE

## 2020-03-10 RX ORDER — RANOLAZINE 500 MG/1
1 TABLET, FILM COATED, EXTENDED RELEASE ORAL
Qty: 60 | Refills: 0
Start: 2020-03-10 | End: 2020-04-08

## 2020-03-10 RX ORDER — TIOTROPIUM BROMIDE 18 UG/1
1 CAPSULE ORAL; RESPIRATORY (INHALATION)
Qty: 30 | Refills: 0
Start: 2020-03-10 | End: 2020-04-08

## 2020-03-10 RX ORDER — DILTIAZEM HCL 120 MG
1 CAPSULE, EXT RELEASE 24 HR ORAL
Qty: 30 | Refills: 0
Start: 2020-03-10 | End: 2020-04-08

## 2020-03-10 RX ORDER — PANTOPRAZOLE SODIUM 20 MG/1
1 TABLET, DELAYED RELEASE ORAL
Qty: 30 | Refills: 0
Start: 2020-03-10 | End: 2020-04-08

## 2020-03-10 RX ORDER — ATORVASTATIN CALCIUM 80 MG/1
1 TABLET, FILM COATED ORAL
Qty: 0 | Refills: 0 | DISCHARGE

## 2020-03-10 RX ORDER — PANTOPRAZOLE SODIUM 20 MG/1
1 TABLET, DELAYED RELEASE ORAL
Qty: 0 | Refills: 0 | DISCHARGE

## 2020-03-10 RX ORDER — ACETAMINOPHEN 500 MG
2 TABLET ORAL
Qty: 240 | Refills: 0
Start: 2020-03-10 | End: 2020-04-08

## 2020-03-10 RX ORDER — POLYETHYLENE GLYCOL 3350 17 G/17G
17 POWDER, FOR SOLUTION ORAL
Qty: 238 | Refills: 0
Start: 2020-03-10 | End: 2020-03-23

## 2020-03-10 RX ORDER — LEVALBUTEROL 1.25 MG/.5ML
2 SOLUTION, CONCENTRATE RESPIRATORY (INHALATION)
Qty: 1 | Refills: 0
Start: 2020-03-10 | End: 2020-04-08

## 2020-03-10 RX ADMIN — Medication 40 MILLIGRAM(S): at 07:55

## 2020-03-10 RX ADMIN — HEPARIN SODIUM 5000 UNIT(S): 5000 INJECTION INTRAVENOUS; SUBCUTANEOUS at 14:23

## 2020-03-10 RX ADMIN — Medication 0.6 MILLIGRAM(S): at 11:08

## 2020-03-10 RX ADMIN — LIDOCAINE 1 PATCH: 4 CREAM TOPICAL at 11:24

## 2020-03-10 RX ADMIN — RANOLAZINE 500 MILLIGRAM(S): 500 TABLET, FILM COATED, EXTENDED RELEASE ORAL at 06:00

## 2020-03-10 RX ADMIN — Medication 12.5 MILLIGRAM(S): at 06:01

## 2020-03-10 RX ADMIN — SODIUM CHLORIDE 3 MILLILITER(S): 9 INJECTION INTRAMUSCULAR; INTRAVENOUS; SUBCUTANEOUS at 05:47

## 2020-03-10 RX ADMIN — LACTULOSE 10 GRAM(S): 10 SOLUTION ORAL at 11:09

## 2020-03-10 RX ADMIN — HEPARIN SODIUM 5000 UNIT(S): 5000 INJECTION INTRAVENOUS; SUBCUTANEOUS at 06:00

## 2020-03-10 RX ADMIN — Medication 0.5 MILLIGRAM(S): at 19:43

## 2020-03-10 RX ADMIN — LIDOCAINE 1 PATCH: 4 CREAM TOPICAL at 17:37

## 2020-03-10 RX ADMIN — ATORVASTATIN CALCIUM 40 MILLIGRAM(S): 80 TABLET, FILM COATED ORAL at 21:32

## 2020-03-10 RX ADMIN — PANTOPRAZOLE SODIUM 40 MILLIGRAM(S): 20 TABLET, DELAYED RELEASE ORAL at 06:01

## 2020-03-10 RX ADMIN — Medication 120 MILLIGRAM(S): at 17:22

## 2020-03-10 RX ADMIN — TIOTROPIUM BROMIDE 1 CAPSULE(S): 18 CAPSULE ORAL; RESPIRATORY (INHALATION) at 13:00

## 2020-03-10 RX ADMIN — HEPARIN SODIUM 5000 UNIT(S): 5000 INJECTION INTRAVENOUS; SUBCUTANEOUS at 21:33

## 2020-03-10 RX ADMIN — LEVALBUTEROL 0.63 MILLIGRAM(S): 1.25 SOLUTION, CONCENTRATE RESPIRATORY (INHALATION) at 10:34

## 2020-03-10 RX ADMIN — SENNA PLUS 2 TABLET(S): 8.6 TABLET ORAL at 21:32

## 2020-03-10 RX ADMIN — LEVALBUTEROL 0.63 MILLIGRAM(S): 1.25 SOLUTION, CONCENTRATE RESPIRATORY (INHALATION) at 06:01

## 2020-03-10 RX ADMIN — Medication 81 MILLIGRAM(S): at 11:08

## 2020-03-10 RX ADMIN — POLYETHYLENE GLYCOL 3350 17 GRAM(S): 17 POWDER, FOR SOLUTION ORAL at 11:11

## 2020-03-10 RX ADMIN — LEVALBUTEROL 0.63 MILLIGRAM(S): 1.25 SOLUTION, CONCENTRATE RESPIRATORY (INHALATION) at 17:21

## 2020-03-10 RX ADMIN — RANOLAZINE 500 MILLIGRAM(S): 500 TABLET, FILM COATED, EXTENDED RELEASE ORAL at 17:22

## 2020-03-10 RX ADMIN — Medication 10 MILLIGRAM(S): at 12:23

## 2020-03-10 RX ADMIN — Medication 0.5 MILLIGRAM(S): at 06:35

## 2020-03-10 RX ADMIN — SODIUM CHLORIDE 3 MILLILITER(S): 9 INJECTION INTRAMUSCULAR; INTRAVENOUS; SUBCUTANEOUS at 16:52

## 2020-03-10 RX ADMIN — SODIUM CHLORIDE 50 MILLILITER(S): 9 INJECTION INTRAMUSCULAR; INTRAVENOUS; SUBCUTANEOUS at 19:38

## 2020-03-10 RX ADMIN — SODIUM CHLORIDE 3 MILLILITER(S): 9 INJECTION INTRAMUSCULAR; INTRAVENOUS; SUBCUTANEOUS at 21:28

## 2020-03-10 NOTE — DISCHARGE NOTE PROVIDER - NSDCCPTREATMENT_GEN_ALL_CORE_FT
PRINCIPAL PROCEDURE  Procedure: Implantation of MitraClip percutaneous mitral valve leaflet clip  Findings and Treatment:       SECONDARY PROCEDURE  Procedure: CT guided percutaneous placement of drainage catheter of pericardium  Findings and Treatment: and subsequent pericardiocentesis

## 2020-03-10 NOTE — DISCHARGE NOTE PROVIDER - NSDCACTIVITY_GEN_ALL_CORE
No heavy lifting/straining/Stairs allowed/Walking - Outdoors allowed/Showering allowed/Walking - Indoors allowed

## 2020-03-10 NOTE — PROGRESS NOTE ADULT - PROBLEM SELECTOR PLAN 2
he is improving with decrease bronchospasm.  Change to Pulmicort 2 puffs BID, and as needed albuterol.  he is on Prednisone.  PFT as needed.  Continue diuresis.  Cr increased and monitor

## 2020-03-10 NOTE — PROGRESS NOTE ADULT - SUBJECTIVE AND OBJECTIVE BOX
Interval Events: Reviewed  Patient seen and examined at bedside.    Patient is a 84y old  Male who presents with a chief complaint of Mitral regurgitation (10 Mar 2020 11:55)    he is better today  PAST MEDICAL & SURGICAL HISTORY:  Mitral regurgitation  HLD (hyperlipidemia)  HTN (hypertension)  S/P hernia surgery      MEDICATIONS:  Pulmonary:  buDESOnide    Inhalation Suspension 0.5 milliGRAM(s) Inhalation every 12 hours  hydrocodone/homatropine Syrup 5 milliLiter(s) Oral every 6 hours PRN  levalbuterol Inhalation 0.63 milliGRAM(s) Inhalation every 6 hours  tiotropium 18 MICROgram(s) Capsule 1 Capsule(s) Inhalation daily    Antimicrobials:  levoFLOXacin  Tablet 750 milliGRAM(s) Oral every 48 hours    Anticoagulants:  aspirin enteric coated 81 milliGRAM(s) Oral daily  heparin  Injectable 5000 Unit(s) SubCutaneous every 8 hours    Cardiac:  diltiazem    milliGRAM(s) Oral daily  ranolazine 500 milliGRAM(s) Oral two times a day      Allergies    penicillin (Unknown)    Intolerances        Vital Signs Last 24 Hrs  T(C): 36.8 (10 Mar 2020 18:38), Max: 36.8 (09 Mar 2020 21:36)  T(F): 98.3 (10 Mar 2020 18:38), Max: 98.3 (10 Mar 2020 18:38)  HR: 123 (10 Mar 2020 18:55) (90 - 123)  BP: 118/69 (10 Mar 2020 18:55) (118/69 - 152/70)  BP(mean): 89 (10 Mar 2020 18:55) (89 - 101)  RR: 20 (10 Mar 2020 18:55) (17 - 22)  SpO2: 92% (10 Mar 2020 14:00) (91% - 95%)    03-09 @ 07:01  -  03-10 @ 07:00  --------------------------------------------------------  IN: 0 mL / OUT: 2800 mL / NET: -2800 mL    03-10 @ 07:01  -  03-10 @ 19:00  --------------------------------------------------------  IN: 910 mL / OUT: 1 mL / NET: 909 mL          Review of Systems:   •	General: negative  •	Skin/Breast: negative  •	Ophthalmologic: negative  •	ENMT: negative  •	Respiratory and Thorax: negative  •	Cardiovascular: negative  •	Gastrointestinal: negative  •	Genitourinary: negative  •	Musculoskeletal: negative  •	Neurological: negative  •	Psychiatric: negative  •	Hematology/Lymphatics: negative  •	Endocrine: negative  •	Allergic/Immunologic: negative    Physical Exam:   • Constitutional:	Well-developed, well nourished  • Eyes:	EOMI; PERRL; no drainage or redness  • ENMT:	No oral lesions; no gross abnormalities  • Neck	No bruits; no thyromegaly or nodules  • Breasts:	not examined  • Back:	No deformity or limitation of movement  • Respiratory:	Breath Sounds equal & clear to percussion & auscultation, no accessory muscle use  • Cardiovascular:	Regular rate & rhythm, normal S1, S2; no murmurs, gallops or rubs; no S3, S4  • Gastrointestinal:	Soft, non-tender, no hepatosplenomegaly, normal bowel sounds  • Genitourinary:	not examined  • Rectal: not examined  • Extremities:	No cyanosis, clubbing or edema  • Vascular:	Equal and normal pulses (carotid, femoral, dorsalis pedis)  • Neurologica:l	not examined  • Skin:	No lesions; no rash  • Lymph Nodes:	No lymphadedenopathy  • Musculoskeletal:	No joint pain, swelling or deformity; no limitation of movement        LABS:      CBC Full  -  ( 10 Mar 2020 05:48 )  WBC Count : 15.93 K/uL  RBC Count : 3.66 M/uL  Hemoglobin : 10.1 g/dL  Hematocrit : 30.4 %  Platelet Count - Automated : 228 K/uL  Mean Cell Volume : 83.1 fl  Mean Cell Hemoglobin : 27.6 pg  Mean Cell Hemoglobin Concentration : 33.2 gm/dL  Auto Neutrophil # : x  Auto Lymphocyte # : x  Auto Monocyte # : x  Auto Eosinophil # : x  Auto Basophil # : x  Auto Neutrophil % : x  Auto Lymphocyte % : x  Auto Monocyte % : x  Auto Eosinophil % : x  Auto Basophil % : x    03-10    128<L>  |  92<L>  |  30<H>  ----------------------------<  130<H>  3.9   |  23  |  1.29    Ca    8.5      10 Mar 2020 12:13  Mg     2.3     03-10    < from: Xray Chest 1 View AP/PA (03.09.20 @ 05:46) >    EXAM:  XR CHEST AP OR PA 1V                          PROCEDURE DATE:  03/09/2020          INTERPRETATION:  Clinical history/reason for exam: Postop.    Single frontal view.    Comparison: March 8, 2020.    Findings/  impression: Interim removal of right internal jugular line. Heart size within normal limits. Mari clip. Bilateral opacities/pleural effusions, stable. Stable bony structures.    < end of copied text >                  Culture Results:   Sputum specimen rejected.  Microscopic examination indicates  oropharyngeal contamination.  Please repeat. (03-09 @ 22:40)      RADIOLOGY & ADDITIONAL STUDIES (The following images were personally reviewed):  Osullivan:                                     No  Urine output:                       adequate  DVT prophylaxis:                 Yes  Flattus:                                  Yes  Bowel movement:              No

## 2020-03-10 NOTE — DISCHARGE NOTE PROVIDER - NSDCFUADDAPPT_GEN_ALL_CORE_FT
-Please attend your follow up appointments.    -Our office will call you when an appointment has been made with Dr. Landrum's office. -Please attend your follow up appointments.    -Our office will call you when an appointment has been made with Dr. Landrum's office. If you don't hear from our office by tomorrow, please call [phone]

## 2020-03-10 NOTE — DISCHARGE NOTE PROVIDER - PROVIDER TOKENS
PROVIDER:[TOKEN:[[id number]],SCHEDULEDAPPT:[03/16/2020],SCHEDULEDAPPTTIME:[10:00 AM]],PROVIDER:[TOKEN:[[id number]],SCHEDULEDAPPT:[03/25/2020],SCHEDULEDAPPTTIME:[12:00 PM]],PROVIDER:[TOKEN:[[id number]]]

## 2020-03-10 NOTE — DISCHARGE NOTE NURSING/CASE MANAGEMENT/SOCIAL WORK - NSDCFUADDAPPT_GEN_ALL_CORE_FT
-Please attend your follow up appointments.    -Our office will call you when an appointment has been made with Dr. Landrum's office. If you don't hear from our office by tomorrow, please call [phone]

## 2020-03-10 NOTE — PROGRESS NOTE ADULT - ASSESSMENT
D/C Instructions:     Samy Canas) Cardiology; Interventional Cardiology [address] [place] [address] Phone: [phone] Fax: [phone] Scheduled Appointment: 03/16/2020 10:00 AM  Jonathan Lira) Cardiovascular Disease; Internal Medicine; Interventional Cardiology 501 [facility], [address] [address] Phone: [phone] Fax: [phone] Scheduled Appointment: 03/25/2020 12:00 PM  Jayne Landrum) Critical Care Medicine; Pulmonary Disease [address], [address] [place] [address] Phone: [phone] Fax: [phone] Follow Up Time:	  AWADALLA, FAWZY ; 03/16/2020 ; [facility] AWADALLA, FAWZY ; 03/25/2020 ; [facility] [address] [place] [address]	  	  -Please attend your follow up appointments.  -Our office will call you when an appointment has been made with Dr. Landrum's office. 	  -No heavy lifting/straining, Showering allowed, Stairs allowed, Walking - Indoors allowed, Walking - Outdoors allowed 	  -Please weigh yourself daily with the scale provided. If you notice greater than a 3 pound gain in 3 days, please start taking your diuretic again, Lasix (Furosemide). Additionally, please call our office to make them aware of this change in weight.   -Walk daily as tolerated and use your incentive spirometer every hour.   -No driving or strenuous activity/exercise until cleared by your surgeon.  -Gently clean your incisions with unscented/antibacterial soap and water, pat dry.  You may leave them open to air.  -Call your doctor if you have shortness of breath, chest pain not relieved by pain medication, dizziness, fever >101.5, or increased redness or drainage from incisions. D/C Instructions:     Samy Canas)  Cardiology; Interventional Cardiology  [address] [place] [address]  Phone: [phone]  Fax: [phone]  Scheduled Appointment: 03/16/2020 10:00 AM    Jonathan Lira)  Cardiovascular Disease; Internal Medicine; Interventional Cardiology  [address] [facility], [facility] [address]  [place] [address]  Phone: [phone]  Fax: [phone]  Scheduled Appointment: 03/25/2020 12:00 PM    Jayne Landrum)  Critical Care Medicine; Pulmonary Disease  [address], [address] [place] [address]  Phone: [phone]  Fax: [phone]  Follow Up Time:  AWADALLA, FAWZY ; 03/16/2020 ; [facility]  AWADALLA, FAWZY ; 03/25/2020 ; [facility] [address] [place] [address]    -Please attend your follow up appointments.    -Our office will call you when an appointment has been made with Dr. Landrum's office.  No heavy lifting/straining, Showering allowed, Stairs allowed, Walking - Indoors allowed, Walking - Outdoors allowed  -Please weigh yourself daily with the scale provided. If you notice greater than a 3 pound gain in 3 days, please start taking your diuretic again, Lasix (Furosemide). Additionally, please call our office to make them aware of this change in weight.     -You are being discharged home on antibiotics for your respiratory infection. Please complete the entire course of this medication.     -You are being discharged home on steroids (a higher dose than what you were prescribed before admission to the hospital). You will be tapered down in dosage over the next week or so. Please adhere to the following schedule:  40mg Prednisone for 3 days (until March 12th)  20mg Prednisone for 3 days (March 13th-15th)  10mg Prednisone, continue until your follow up appointments     -Walk daily as tolerated and use your incentive spirometer every hour.     -No driving or strenuous activity/exercise until cleared by your surgeon.    -Gently clean your incisions with unscented/antibacterial soap and water, pat dry.  You may leave them open to air.    -Call your doctor if you have shortness of breath, chest pain not relieved by pain medication, dizziness, fever >101.5, or increased redness or drainage from incisions.

## 2020-03-10 NOTE — DISCHARGE NOTE PROVIDER - NSDCMRMEDTOKEN_GEN_ALL_CORE_FT
aspirin 81 mg oral tablet, chewable: 1 tab(s) orally once a day  atorvastatin 40 mg oral tablet: 1 tab(s) orally once a day  lisinopril 10 mg oral tablet: 1 tab(s) orally once a day  pantoprazole 40 mg oral delayed release tablet: 1 tab(s) orally once a day  predniSONE 10 mg oral tablet: 1 tab(s) orally once a day  Senna 8.6 mg oral tablet: 1 tab(s) orally once a day (at bedtime) acetaminophen 325 mg oral tablet: 2 tab(s) orally every 6 hours, As needed, Mild Pain (1 - 3) MDD:8 tabs  aspirin 81 mg oral tablet, chewable: 1 tab(s) orally once a day  atorvastatin 40 mg oral tablet: 1 tab(s) orally once a day  budesonide 180 mcg/inh inhalation powder: 2 puff(s) inhaled once a day   colchicine 0.6 mg oral tablet: 1 tab(s) orally once a day  dilTIAZem 120 mg/24 hours oral capsule, extended release: 1 cap(s) orally once a day  hydrocodone-homatropine 5 mg-1.5 mg/5 mL oral syrup: 5 milliliter(s) orally every 6 hours, As needed, Cough MDD:30mL  hydrocortisone 2.5% topical lotion: 1 application topically once a day (at bedtime)  Levaquin 750 mg oral tablet: 1 tab(s) orally once a day   pantoprazole 40 mg oral delayed release tablet: 1 tab(s) orally once a day  polyethylene glycol 3350 oral powder for reconstitution: 17 gram(s) orally once a day, As Needed   predniSONE 10 mg oral tablet: Take 4 tabs (40 mg) once a day on 3/11 &amp; 3/12. Take 2 tabs (20mg) once a day on 3/13 - 3/15. Take 1 tab (10mg) once a day from 3/16   ranolazine 500 mg oral tablet, extended release: 1 tab(s) orally 2 times a day  Senna 8.6 mg oral tablet: 1 tab(s) orally once a day (at bedtime)  tiotropium 18 mcg inhalation capsule: 1 cap(s) inhaled once a day  traMADol 50 mg oral tablet: 0.5 tab(s) orally every 6 hours, As needed, Severe Pain (7 - 10) MDD:2 tabs  Xopenex HFA 45 mcg/inh inhalation aerosol: 2 puff(s) inhaled every 6 hours, As Needed for wheezing acetaminophen 325 mg oral tablet: 2 tab(s) orally every 6 hours, As needed, Mild Pain (1 - 3) MDD:8 tabs  aspirin 81 mg oral tablet, chewable: 1 tab(s) orally once a day  atorvastatin 40 mg oral tablet: 1 tab(s) orally once a day  budesonide 180 mcg/inh inhalation powder: 2 puff(s) inhaled once a day   colchicine 0.6 mg oral tablet: 1 tab(s) orally once a day  dilTIAZem 120 mg/24 hours oral capsule, extended release: 1 cap(s) orally once a day  hydrocodone-homatropine 5 mg-1.5 mg/5 mL oral syrup: 5 milliliter(s) orally every 6 hours, As needed, Cough MDD:30mL  hydrocortisone 2.5% topical lotion: 1 application topically once a day (at bedtime)  Levaquin 750 mg oral tablet: 1 tab(s) orally every 48 hours   pantoprazole 40 mg oral delayed release tablet: 1 tab(s) orally once a day  polyethylene glycol 3350 oral powder for reconstitution: 17 gram(s) orally once a day, As Needed   predniSONE 10 mg oral tablet: Take 4 tabs (40 mg) once a day on 3/11 &amp; 3/12. Take 2 tabs (20mg) once a day on 3/13 - 3/15. Take 1 tab (10mg) once a day from 3/16   ranolazine 500 mg oral tablet, extended release: 1 tab(s) orally 2 times a day  Senna 8.6 mg oral tablet: 1 tab(s) orally once a day (at bedtime)  tiotropium 18 mcg inhalation capsule: 1 cap(s) inhaled once a day  traMADol 50 mg oral tablet: 0.5 tab(s) orally every 6 hours, As needed, Severe Pain (7 - 10) MDD:2 tabs  Xopenex HFA 45 mcg/inh inhalation aerosol: 2 puff(s) inhaled every 6 hours, As Needed for wheezing

## 2020-03-10 NOTE — DISCHARGE NOTE PROVIDER - NSDCFUADDINST_GEN_ALL_CORE_FT
-Walk daily as tolerated and use your incentive spirometer every hour.     -No driving or strenuous activity/exercise until cleared by your surgeon.    -Gently clean your incisions with unscented/antibacterial soap and water, pat dry.  You may leave them open to air.    -Call your doctor if you have shortness of breath, chest pain not relieved by pain medication, dizziness, fever >101.5, or increased redness or drainage from incisions. -Please weigh yourself daily with the scale provided. If you notice greater than a 3 pound gain in 3 days, please start taking your diuretic again, Lasix (Furosemide). Additionally, please call our office to make them aware of this change in weight.     -Walk daily as tolerated and use your incentive spirometer every hour.     -No driving or strenuous activity/exercise until cleared by your surgeon.    -Gently clean your incisions with unscented/antibacterial soap and water, pat dry.  You may leave them open to air.    -Call your doctor if you have shortness of breath, chest pain not relieved by pain medication, dizziness, fever >101.5, or increased redness or drainage from incisions. -Please weigh yourself daily with the scale provided. If you notice greater than a 3 pound gain in 3 days, please start taking your diuretic again, Lasix (Furosemide). Additionally, please call our office to make them aware of this change in weight.     -You are being discharged home on antibiotics for your respiratory infection. Please complete the entire course of this medication.     -You are being discharged home on steroids (a higher dose than what you were prescribed before admission to the hospital). You will be tapered down in dosage over the next week or so. Please adhere to the following schedule:  40mg Prednisone for 3 days (until March 12th)  20mg Prednisone for 3 days (March 13th-15th)  10mg Prednisone, continue until your follow up appointments     -Walk daily as tolerated and use your incentive spirometer every hour.     -No driving or strenuous activity/exercise until cleared by your surgeon.    -Gently clean your incisions with unscented/antibacterial soap and water, pat dry.  You may leave them open to air.    -Call your doctor if you have shortness of breath, chest pain not relieved by pain medication, dizziness, fever >101.5, or increased redness or drainage from incisions.

## 2020-03-10 NOTE — PROGRESS NOTE ADULT - SUBJECTIVE AND OBJECTIVE BOX
Patient discussed on morning rounds with Dr. Canas     Operation / Date: 3/5/2020 - MiraClip x1  c/b pericardialcentesis and drain placement, EF 60%     Surgeon: Dr. Canas     Referring Physician: Dr. Jonathan Lira     SUBJECTIVE ASSESSMENT  Pt is feeling well this morning and much improved from yesterday. Ambulating without difficulty Eating/drinking well. Has not moved his bowels yet today but feels like he will soon and is passing gas frequently. Denies any CP, palpitations, SOB, wheezing, abd pain, n/v/d/c, fevers or chills.     HOSPITAL COURSE:  This is an 85 y/o male, resident of [place] and [place] speaking, former smoker (30 pack years, quit 30 years ago) with PMHx of HTN and HLD who presented to the [place] ED on 3/1/2010 with c/o chest pain and palpitations that started while he was at home eating dinner, non-radiating and resolved with rest. His family brought him to the ED. Upon arrival VS stable and serial Troponin negative. He was admitted for further monitoring and work up. Cardiac cath revealed 100%  distal LAD. ECHO showed moderate to severe MR and he was transferred to [facility] under Dr. Canas for further workup and management.      On 3/5/2020 pt underwent Mitraclip. Procedure complicated by pericardial effusion requiring pericardial drain placement. He was transferred to the CTICU intubated. He was extubated to [facility] on POD 0. On POD 1 pressors were weaned off and pt was transitioned to NC. POD 1 ECHO showed moderate to severe MR. Pericardial drain was removed. Pt also had post-operative afib which converted back to NSR with amiodarone bolus. POD 3 he was stepped down to 9lachman. Pt noted to be wheezing and placed on NC. On POD4 pt continued wheezing and levaquin started with nebs, and x1 dose IV steroids. Pt clinically improved by the evening. On POD5 pt no longer wheezing and oxygenating well on RA without NC. Per Dr. Canas, pt is medically ready to be discharged home.     Of note: pt noted to have small increase in creatinine the morning of discharge. Repeat BMP showing _______. Per Dr. Canas, he is okay with patient going home and not taking his diuretic unless he gains more than 3 pounds in 3 days. Pt being discharged home with a scale to monitor his weight.     Vital Signs Last 24 Hrs  T(C): 36.4 (10 Mar 2020 10:16), Max: 36.9 (09 Mar 2020 17:31)  T(F): 97.6 (10 Mar 2020 10:16), Max: 98.4 (09 Mar 2020 17:31)  HR: 101 (10 Mar 2020 10:38) (90 - 101)  BP: 132/64 (10 Mar 2020 10:38) (132/64 - 152/70)  BP(mean): 91 (10 Mar 2020 10:38) (91 - 101)  RR: 22 (10 Mar 2020 10:38) (17 - 22)  SpO2: 91% (10 Mar 2020 10:38) (91% - 95%)    EPICARDIAL WIRES REMOVED: n/a  TIE DOWNS REMOVED: n/a    PHYSICAL EXAM:  General: well appearing sitting up in chair at the bedside in [facility]   Neurological: AOx3. Motor skills grossly intact  Cardiovascular: Normal S1/S2. Regular rate/rhythm. No murmurs  Respiratory: Lungs CTA bilaterally. No wheezing or rales  Gastrointestinal: +BS in all 4 quadrants. Non-distended. Soft. Non-tender  Extremities: Strength 5/5 b/l upper/lower extremities. No edema. No calf tenderness.  Vascular: Radial 2+bilaterally, DP 2+ b/l  Incision Sites: B/l groin incisions healing well no erythema, no purulence, no bruits, no ecchymosis.       LABS:                        10.1   15.93 )-----------( 228      ( 10 Mar 2020 05:48 )             30.4       COUMADIN:  no        03-10    132<L>  |  96  |  33<H>  ----------------------------<  132<H>  4.2   |  23  |  1.43<H>    Ca    8.8      10 Mar 2020 05:48  Mg     2.3     03-10            Discharge CXR:  < from: Xray Chest 1 View AP/PA (03.09.20 @ 05:46) >  Findings/  impression: Interim removal of right internal jugular line. Heart size within normal limits. Mari clip. Bilateral opacities/pleural effusions, stable. Stable bony structures.  < end of copied text >    Discharge ECHO:  < from: TTE Limited Echo w/o Cont (03.07.20 @ 09:12) >  CONCLUSIONS:   1. Hyperdynamic left ventricular systolic function.   2. Normal right ventricular size and systolic function.   3. Small pericardial effusion without echocardiographic evidence of cardiac tamponade physiology.   4. Valvular function was not assessed on this limited echocardiogram.  < end of copied text > Patient discussed on morning rounds with Dr. Canas     Operation / Date: 3/5/2020 - MiraClip x1  c/b pericardialcentesis and drain placement, EF 60%     Surgeon: Dr. Canas     Referring Physician: Dr. Jonathan Lira     SUBJECTIVE ASSESSMENT  Pt is feeling well this morning and much improved from yesterday. Ambulating without difficulty Eating/drinking well. Has not moved his bowels yet today but feels like he will soon and is passing gas frequently. Denies any CP, palpitations, SOB, wheezing, abd pain, n/v/d/c, fevers or chills.     HOSPITAL COURSE:  This is an 83 y/o male, resident of [place] and Malay speaking, former smoker (30 pack years, quit 30 years ago) with PMHx of HTN and HLD who presented to the [place] ED on 3/1/2010 with c/o chest pain and palpitations that started while he was at home eating dinner, non-radiating and resolved with rest. His family brought him to the ED. Upon arrival VS stable and serial Troponin negative. He was admitted for further monitoring and work up. Cardiac cath revealed 100%  distal LAD. ECHO showed moderate to severe MR and he was transferred to [facility] under Dr. Canas for further workup and management.      On 3/5/2020 pt underwent Mitraclip. Procedure complicated by pericardial effusion requiring pericardial drain placement. He was transferred to the CTICU intubated. He was extubated to [facility] on POD 0. On POD 1 pressors were weaned off and pt was transitioned to NC. POD 1 ECHO showed moderate to severe MR. Pericardial drain was removed. Pt also had post-operative afib which converted back to NSR with amiodarone bolus. POD 3 he was stepped down to 9lachman. Pt noted to be wheezing and placed on NC. On POD4 pt continued wheezing and levaquin started with nebs, and x1 dose IV steroids. Pt clinically improved by the evening. On POD5 pt no longer wheezing and oxygenating well on RA without NC. Per Dr. Canas, pt is medically ready to be discharged home.     Of note: pt noted to have small increase in creatinine the morning of discharge. Repeat BMP showing improving Cr level after encouraging oral intake of fluids. Per Dr. Canas, he is okay with patient going home and not taking his diuretic unless he gains more than 3 pounds in 3 days. Pt being discharged home with a scale to monitor his weight.     Vital Signs Last 24 Hrs  T(C): 36.4 (10 Mar 2020 10:16), Max: 36.9 (09 Mar 2020 17:31)  T(F): 97.6 (10 Mar 2020 10:16), Max: 98.4 (09 Mar 2020 17:31)  HR: 101 (10 Mar 2020 10:38) (90 - 101)  BP: 132/64 (10 Mar 2020 10:38) (132/64 - 152/70)  BP(mean): 91 (10 Mar 2020 10:38) (91 - 101)  RR: 22 (10 Mar 2020 10:38) (17 - 22)  SpO2: 91% (10 Mar 2020 10:38) (91% - 95%)    EPICARDIAL WIRES REMOVED: n/a  TIE DOWNS REMOVED: n/a    PHYSICAL EXAM:  General: well appearing sitting up in chair at the bedside in [facility]   Neurological: AOx3. Motor skills grossly intact  Cardiovascular: Normal S1/S2. Regular rate/rhythm. No murmurs  Respiratory: Lungs CTA bilaterally. No wheezing or rales  Gastrointestinal: +BS in all 4 quadrants. Non-distended. Soft. Non-tender  Extremities: Strength 5/5 b/l upper/lower extremities. No edema. No calf tenderness.  Vascular: Radial 2+bilaterally, DP 2+ b/l  Incision Sites: B/l groin incisions healing well no erythema, no purulence, no bruits, no ecchymosis.       LABS:                        10.1   15.93 )-----------( 228      ( 10 Mar 2020 05:48 )             30.4       COUMADIN:  no        03-10    132<L>  |  96  |  33<H>  ----------------------------<  132<H>  4.2   |  23  |  1.43<H>    Ca    8.8      10 Mar 2020 05:48  Mg     2.3     03-10            Discharge CXR:  < from: Xray Chest 1 View AP/PA (03.09.20 @ 05:46) >  Findings/  impression: Interim removal of right internal jugular line. Heart size within normal limits. Mari clip. Bilateral opacities/pleural effusions, stable. Stable bony structures.  < end of copied text >    Discharge ECHO:  < from: TTE Limited Echo w/o Cont (03.07.20 @ 09:12) >  CONCLUSIONS:   1. Hyperdynamic left ventricular systolic function.   2. Normal right ventricular size and systolic function.   3. Small pericardial effusion without echocardiographic evidence of cardiac tamponade physiology.   4. Valvular function was not assessed on this limited echocardiogram.  < end of copied text >

## 2020-03-10 NOTE — DISCHARGE NOTE PROVIDER - NSDCFUSCHEDAPPT_GEN_ALL_CORE_FT
AWADALLA, FAWZY ; 03/16/2020 ; [facility] PreAdmits  AWADALLA, FAWZY ; 03/25/2020 ; [facility] [address] AWADALLA, FAWZY ; 03/16/2020 ; [facility] PreAdmits  AWADALLA, FAWZY ; 03/25/2020 ; [facility] [address] AWADALLA, FAWZY ; 03/16/2020 ; [facility] PreAdmits  AWADALLA, FAWZY ; 03/25/2020 ; [facility] [address] AWADALLA, FAWZY ; 03/16/2020 ; [facility] PreAdmits  AWADALLA, FAWZY ; 03/25/2020 ; [facility] [address] AWADALLA, FAWZY ; 03/16/2020 ; [facility] PreAdmits  AWADALLA, FAWZY ; 03/25/2020 ; [facility] [address] AWADALLA, FAWZY ; 03/16/2020 ; [facility] PreAdmits  AWADALLA, FAWZY ; 03/25/2020 ; [facility] [address] AWADALLA, FAWZY ; 03/16/2020 ; [facility] PreAdmits  AWADALLA, FAWZY ; 03/25/2020 ; [facility] [address] AWADALLA, FAWZY ; 03/16/2020 ; [facility] PreAdmits  AWADALLA, FAWZY ; 03/25/2020 ; [facility] [address] AWADALLA, FAWZY ; 03/16/2020 ; [facility] PreAdmits  AWADALLA, FAWZY ; 03/25/2020 ; [facility] [address] AWADALLA, FAWZY ; 03/16/2020 ; [facility] PreAdmits  AWADALLA, FAWZY ; 03/25/2020 ; [facility] [address] AWADALLA, FAWZY ; 03/16/2020 ; [facility] PreAdmits  AWADALLA, FAWZY ; 03/25/2020 ; [facility] [address] AWADALLA, FAWZY ; 05/14/2020 ; [facility]

## 2020-03-10 NOTE — DISCHARGE NOTE PROVIDER - CARE PROVIDER_API CALL
Samy Canas)  Cardiology; Interventional Cardiology  [address], [address] [place] [address]  Phone: [phone]  Fax: [phone]  Scheduled Appointment: 03/16/2020 10:00 AM    Jonathan Lira)  Cardiovascular Disease; Internal Medicine; Interventional Cardiology  [address] [place] [address]  Phone: [phone]  Fax: [phone]  Scheduled Appointment: 03/25/2020 12:00 PM    Jayne Landrum)  Critical Care Medicine; Pulmonary Disease  [address], [address] [place] [address]  Phone: [phone]  Fax: [phone]  Follow Up Time:

## 2020-03-10 NOTE — DISCHARGE NOTE PROVIDER - CARE PROVIDERS DIRECT ADDRESSES
[email or web address]

## 2020-03-10 NOTE — PROGRESS NOTE ADULT - REASON FOR ADMISSION
MitraClip
Mitral regurgitation

## 2020-03-10 NOTE — DISCHARGE NOTE NURSING/CASE MANAGEMENT/SOCIAL WORK - PATIENT PORTAL LINK FT
You can access the FollowMyHealth Patient Portal offered by [facility] by registering at the following website: [email or web address]. By joining [employer]s FollowMyHealth portal, you will also be able to view your health information using other applications (apps) compatible with our system.

## 2020-03-10 NOTE — DISCHARGE NOTE PROVIDER - HOSPITAL COURSE
This is an 85 y/o male, resident of [place] and Y[place] speaking, former smoker (30 pack years, quit 30 years ago) with PMHx of HTN and HLD who presented to the [place] ED on 3/1/2010 with c/o chest pain and palpitations that started while he was at home eating dinner, non-radiating and resolved with rest. His family brought him to the ED. Upon arrival VS stable and serial Troponin negative. He was admitted for further monitoring and work up. Cardiac cath revealed 100%  distal LAD. ECHO showed moderate to severe MR and he was transferred to [facility] under Dr. Canas for further workup and management.          On 3/5/2020 pt underwent Mitraclip. Procedure complicated by pericardial effusion requiring pericardial drain placement. He was transferred to the CTICU intubated. He was extubated to [facility] on POD 0. On POD 1 pressors were weaned off and pt was transitioned to NC. POD 1 ECHO showed moderate to severe MR. Pericardial drain was removed. Pt also had post-operative afib which converted back to NSR with amiodarone bolus. POD 3 he was stepped down to 9lachman. Pt noted to be wheezing and placed on NC. On POD4 pt continued wheezing and levaquin started with nebs, and x1 dose IV steroids. Pt clinically improved by the evening. On POD5 pt no longer wheezing and oxygenating well on RA without NC. Per Dr. Canas, pt is medically ready to be discharged home.         Of note: pt noted to have small increase in creatinine the morning of discharge. Repeat BMP showing _______. Per Dr. Canas, he is okay with patient going home and not taking his diuretic unless he gains more than 3 pounds in 3 days. Pt being discharged home with a scale to monitor his weight. This is an 85 y/o male, resident of [place] and Polish speaking, former smoker (30 pack years, quit 30 years ago) with PMHx of HTN and HLD who presented to the [facility] ED on 3/1/2010 with c/o chest pain and palpitations that started while he was at home eating dinner, non-radiating and resolved with rest. His family brought him to the ED. Upon arrival VS stable and serial Troponin negative. He was admitted for further monitoring and work up. Cardiac cath revealed 100%  distal LAD. ECHO showed moderate to severe MR and he was transferred to [facility] under Dr. Canas for further workup and management.          On 3/5/2020 pt underwent Mitraclip. Procedure complicated by pericardial effusion requiring pericardial drain placement. He was transferred to the CTICU intubated. He was extubated to [facility] on POD 0. On POD 1 pressors were weaned off and pt was transitioned to NC. POD 1 ECHO showed moderate to severe MR. Pericardial drain was removed. Pt also had post-operative afib which converted back to NSR with amiodarone bolus. POD 3 he was stepped down to 9lachman. Pt noted to be wheezing and placed on NC. On POD4 pt continued wheezing and levaquin started with nebs, and x1 dose IV steroids. Pt clinically improved by the evening. On POD5 pt no longer wheezing and oxygenating well on RA without NC. Per Dr. Canas, pt is medically ready to be discharged home.         Of note: pt noted to have small increase in creatinine the morning of discharge. Repeat BMP showing improvement of Cr. Per Dr. Canas, he is okay with patient going home and not taking his diuretic unless he gains more than 3 pounds in 3 days. Pt being discharged home with a scale to monitor his weight. This is an 85 y/o male, resident of [place] and Urdu speaking, former smoker (30 pack years, quit 30 years ago) with PMHx of HTN and HLD who presented to the [place] ED on 3/1/2010 with c/o chest pain and palpitations that started while he was at home eating dinner, non-radiating and resolved with rest. His family brought him to the ED. Upon arrival VS stable and serial Troponin negative. He was admitted for further monitoring and work up. Cardiac cath revealed 100%  distal LAD. ECHO showed moderate to severe MR and he was transferred to [facility] under Dr. Canas for further workup and management.          On 3/5/2020 pt underwent Mitraclip. Procedure complicated by pericardial effusion requiring pericardial drain placement. He was transferred to the CTICU intubated. He was extubated to [facility] on POD 0. On POD 1 pressors were weaned off and pt was transitioned to NC. POD 1 ECHO showed moderate to severe MR. Pericardial drain was removed. Pt also had post-operative afib which converted back to NSR with amiodarone bolus. POD 3 he was stepped down to 9lachman. Pt noted to be wheezing and placed on NC. On POD4 pt continued wheezing and levaquin started with nebs, and x1 dose IV steroids. Pt clinically improved by the evening. On POD5 pt no longer wheezing and oxygenating well on RA without NC. Per Dr. Canas, pt is medically ready to be discharged home.         Of note: pt noted to have small increase in creatinine and the sodium morning of discharge. Repeat BMP showing improvement of Cr. Per Dr. Canas, he is okay with patient going home and not taking his diuretic unless he gains more than 3 pounds in 3 days. Pt being discharged home with a scale to monitor his weight.         Over 35 minutes was spent with the patient reviewing the discharge material including medications, follow up appointments, recovery, concerning symptoms, and how to contact their health care providers if they have questions This is an 85 y/o male, resident of [place] and Welsh speaking, former smoker (30 pack years, quit 30 years ago) with PMHx of HTN and HLD who presented to the [place] ED on 3/1/2010 with c/o chest pain and palpitations that started while he was at home eating dinner, non-radiating and resolved with rest. His family brought him to the ED. Upon arrival VS stable and serial Troponin negative. He was admitted for further monitoring and work up. Cardiac cath revealed 100%  distal LAD. ECHO showed moderate to severe MR and he was transferred to [facility] under Dr. Canas for further workup and management.          On 3/5/2020 pt underwent Mitraclip. Procedure complicated by pericardial effusion requiring pericardial drain placement. He was transferred to the CTICU intubated. He was extubated to [facility] on POD 0. On POD 1 pressors were weaned off and pt was transitioned to NC. POD 1 ECHO showed moderate MR. Pericardial drain was removed. Pt also had post-operative afib which converted back to NSR with amiodarone bolus. POD 3 he was stepped down to 9lachman. Pt noted to be wheezing and placed on NC. On POD4 pt continued wheezing and levaquin started with nebs, and x1 dose IV steroids. Pt clinically improved by the evening. On POD5 pt no longer wheezing and oxygenating well on RA without NC. Per Dr. Canas, pt is medically ready to be discharged home.         Of note: pt noted to have small increase in creatinine and the sodium morning of discharge. Repeat BMP showing improvement of Cr. Per Dr. Canas, he is okay with patient going home and not taking his diuretic unless he gains more than 3 pounds in 3 days. Pt being discharged home with a scale to monitor his weight.         Over 35 minutes was spent with the patient reviewing the discharge material including medications, follow up appointments, recovery, concerning symptoms, and how to contact their health care providers if they have questions

## 2020-03-11 DIAGNOSIS — I25.10 ATHEROSCLEROTIC HEART DISEASE OF NATIVE CORONARY ARTERY WITHOUT ANGINA PECTORIS: ICD-10-CM

## 2020-03-11 DIAGNOSIS — E78.5 HYPERLIPIDEMIA, UNSPECIFIED: ICD-10-CM

## 2020-03-11 DIAGNOSIS — I10 ESSENTIAL (PRIMARY) HYPERTENSION: ICD-10-CM

## 2020-03-11 DIAGNOSIS — K21.9 GASTRO-ESOPHAGEAL REFLUX DISEASE WITHOUT ESOPHAGITIS: ICD-10-CM

## 2020-03-11 DIAGNOSIS — R07.9 CHEST PAIN, UNSPECIFIED: ICD-10-CM

## 2020-03-11 DIAGNOSIS — I34.0 NONRHEUMATIC MITRAL (VALVE) INSUFFICIENCY: ICD-10-CM

## 2020-03-11 DIAGNOSIS — Z88.0 ALLERGY STATUS TO PENICILLIN: ICD-10-CM

## 2020-03-11 DIAGNOSIS — I27.20 PULMONARY HYPERTENSION, UNSPECIFIED: ICD-10-CM

## 2020-03-16 ENCOUNTER — APPOINTMENT (OUTPATIENT)
Dept: SURGERY | Facility: AMBULATORY SURGERY CENTER | Age: 85
End: 2020-03-16

## 2020-03-18 DIAGNOSIS — J15.9 UNSPECIFIED BACTERIAL PNEUMONIA: ICD-10-CM

## 2020-03-18 DIAGNOSIS — Z88.0 ALLERGY STATUS TO PENICILLIN: ICD-10-CM

## 2020-03-18 DIAGNOSIS — I34.0 NONRHEUMATIC MITRAL (VALVE) INSUFFICIENCY: ICD-10-CM

## 2020-03-18 DIAGNOSIS — Z00.6 ENCOUNTER FOR EXAMINATION FOR NORMAL COMPARISON AND CONTROL IN CLINICAL RESEARCH PROGRAM: ICD-10-CM

## 2020-03-18 DIAGNOSIS — I95.81 POSTPROCEDURAL HYPOTENSION: ICD-10-CM

## 2020-03-18 DIAGNOSIS — I48.91 UNSPECIFIED ATRIAL FIBRILLATION: ICD-10-CM

## 2020-03-18 DIAGNOSIS — I25.118 ATHEROSCLEROTIC HEART DISEASE OF NATIVE CORONARY ARTERY WITH OTHER FORMS OF ANGINA PECTORIS: ICD-10-CM

## 2020-03-18 DIAGNOSIS — D62 ACUTE POSTHEMORRHAGIC ANEMIA: ICD-10-CM

## 2020-03-18 DIAGNOSIS — I97.88 OTHER INTRAOPERATIVE COMPLICATIONS OF THE CIRCULATORY SYSTEM, NOT ELSEWHERE CLASSIFIED: ICD-10-CM
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
## 2020-03-18 DIAGNOSIS — K59.00 CONSTIPATION, UNSPECIFIED: ICD-10-CM

## 2020-03-18 DIAGNOSIS — I50.33 ACUTE ON CHRONIC DIASTOLIC (CONGESTIVE) HEART FAILURE: ICD-10-CM

## 2020-03-18 DIAGNOSIS — E87.2 ACIDOSIS: ICD-10-CM

## 2020-03-18 DIAGNOSIS — Z79.82 LONG TERM (CURRENT) USE OF ASPIRIN: ICD-10-CM

## 2020-03-18 DIAGNOSIS — J20.9 ACUTE BRONCHITIS, UNSPECIFIED: ICD-10-CM

## 2020-03-18 DIAGNOSIS — I25.82 CHRONIC TOTAL OCCLUSION OF CORONARY ARTERY: ICD-10-CM
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
## 2020-03-18 DIAGNOSIS — I47.1 SUPRAVENTRICULAR TACHYCARDIA: ICD-10-CM

## 2020-03-18 DIAGNOSIS — Z79.52 LONG TERM (CURRENT) USE OF SYSTEMIC STEROIDS: ICD-10-CM

## 2020-03-18 DIAGNOSIS — I11.0 HYPERTENSIVE HEART DISEASE WITH HEART FAILURE: ICD-10-CM

## 2020-03-18 DIAGNOSIS — E78.5 HYPERLIPIDEMIA, UNSPECIFIED: ICD-10-CM

## 2020-03-18 DIAGNOSIS — L40.9 PSORIASIS, UNSPECIFIED: ICD-10-CM

## 2020-03-18 DIAGNOSIS — Z87.891 PERSONAL HISTORY OF NICOTINE DEPENDENCE: ICD-10-CM
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
## 2020-03-18 DIAGNOSIS — I31.3 PERICARDIAL EFFUSION (NONINFLAMMATORY): ICD-10-CM

## 2020-03-18 DIAGNOSIS — J98.11 ATELECTASIS: ICD-10-CM

## 2020-03-18 DIAGNOSIS — T81.10XA POSTPROCEDURAL SHOCK UNSPECIFIED, INITIAL ENCOUNTER: ICD-10-CM

## 2020-03-24 ENCOUNTER — APPOINTMENT (OUTPATIENT)
Dept: SURGERY | Facility: CLINIC | Age: 85
End: 2020-03-24

## 2020-04-01 ENCOUNTER — OUTPATIENT (OUTPATIENT)
Dept: OUTPATIENT SERVICES | Facility: HOSPITAL | Age: 85
LOS: 1 days | End: 2020-04-01
Payer: MEDICARE

## 2020-04-01 DIAGNOSIS — Z98.890 OTHER SPECIFIED POSTPROCEDURAL STATES: Chronic | ICD-10-CM

## 2020-04-01 PROCEDURE — G9001: CPT

## 2020-04-07 RX ORDER — DILTIAZEM HYDROCHLORIDE 120 MG/1
120 CAPSULE, EXTENDED RELEASE ORAL DAILY
Qty: 30 | Refills: 0 | Status: ACTIVE | COMMUNITY
Start: 2020-04-07 | End: 1900-01-01

## 2020-04-21 PROCEDURE — 82962 GLUCOSE BLOOD TEST: CPT

## 2020-04-21 PROCEDURE — 83605 ASSAY OF LACTIC ACID: CPT

## 2020-04-21 PROCEDURE — 93880 EXTRACRANIAL BILAT STUDY: CPT

## 2020-04-21 PROCEDURE — 84443 ASSAY THYROID STIM HORMONE: CPT

## 2020-04-21 PROCEDURE — 83735 ASSAY OF MAGNESIUM: CPT

## 2020-04-21 PROCEDURE — 97116 GAIT TRAINING THERAPY: CPT

## 2020-04-21 PROCEDURE — C1894: CPT

## 2020-04-21 PROCEDURE — 84100 ASSAY OF PHOSPHORUS: CPT

## 2020-04-21 PROCEDURE — 85027 COMPLETE CBC AUTOMATED: CPT

## 2020-04-21 PROCEDURE — 80048 BASIC METABOLIC PNL TOTAL CA: CPT

## 2020-04-21 PROCEDURE — 83036 HEMOGLOBIN GLYCOSYLATED A1C: CPT

## 2020-04-21 PROCEDURE — 36415 COLL VENOUS BLD VENIPUNCTURE: CPT

## 2020-04-21 PROCEDURE — C1769: CPT

## 2020-04-21 PROCEDURE — 93312 ECHO TRANSESOPHAGEAL: CPT

## 2020-04-21 PROCEDURE — 84132 ASSAY OF SERUM POTASSIUM: CPT

## 2020-04-21 PROCEDURE — 71045 X-RAY EXAM CHEST 1 VIEW: CPT

## 2020-04-21 PROCEDURE — 82330 ASSAY OF CALCIUM: CPT

## 2020-04-21 PROCEDURE — 97161 PT EVAL LOW COMPLEX 20 MIN: CPT

## 2020-04-21 PROCEDURE — 93306 TTE W/DOPPLER COMPLETE: CPT

## 2020-04-21 PROCEDURE — 86850 RBC ANTIBODY SCREEN: CPT

## 2020-04-21 PROCEDURE — 87070 CULTURE OTHR SPECIMN AEROBIC: CPT

## 2020-04-21 PROCEDURE — C1889: CPT

## 2020-04-21 PROCEDURE — 85610 PROTHROMBIN TIME: CPT

## 2020-04-21 PROCEDURE — 86901 BLOOD TYPING SEROLOGIC RH(D): CPT

## 2020-04-21 PROCEDURE — 93321 DOPPLER ECHO F-UP/LMTD STD: CPT

## 2020-04-21 PROCEDURE — 81003 URINALYSIS AUTO W/O SCOPE: CPT

## 2020-04-21 PROCEDURE — 94640 AIRWAY INHALATION TREATMENT: CPT

## 2020-04-21 PROCEDURE — 85730 THROMBOPLASTIN TIME PARTIAL: CPT

## 2020-04-21 PROCEDURE — 85025 COMPLETE CBC W/AUTO DIFF WBC: CPT

## 2020-04-21 PROCEDURE — P9045: CPT

## 2020-04-21 PROCEDURE — 93005 ELECTROCARDIOGRAM TRACING: CPT

## 2020-04-21 PROCEDURE — 82803 BLOOD GASES ANY COMBINATION: CPT

## 2020-04-21 PROCEDURE — 86923 COMPATIBILITY TEST ELECTRIC: CPT

## 2020-04-21 PROCEDURE — 80053 COMPREHEN METABOLIC PANEL: CPT

## 2020-04-21 PROCEDURE — 84295 ASSAY OF SERUM SODIUM: CPT

## 2020-04-21 PROCEDURE — 83880 ASSAY OF NATRIURETIC PEPTIDE: CPT

## 2020-04-21 PROCEDURE — C1729: CPT

## 2020-05-06 DIAGNOSIS — Z71.89 OTHER SPECIFIED COUNSELING: ICD-10-CM

## 2020-05-13 ENCOUNTER — APPOINTMENT (OUTPATIENT)
Dept: CARDIOLOGY | Facility: CLINIC | Age: 85
End: 2020-05-13
Payer: MEDICARE

## 2020-05-13 VITALS
WEIGHT: 136 LBS | HEART RATE: 87 BPM | HEIGHT: 61 IN | TEMPERATURE: 98 F | BODY MASS INDEX: 25.68 KG/M2 | SYSTOLIC BLOOD PRESSURE: 122 MMHG | DIASTOLIC BLOOD PRESSURE: 70 MMHG

## 2020-05-13 PROCEDURE — 93000 ELECTROCARDIOGRAM COMPLETE: CPT

## 2020-05-13 PROCEDURE — 99214 OFFICE O/P EST MOD 30 MIN: CPT

## 2020-05-13 NOTE — PHYSICAL EXAM
[Normal Appearance] : normal appearance [General Appearance - Well Developed] : well developed [Well Groomed] : well groomed [General Appearance - Well Nourished] : well nourished [General Appearance - In No Acute Distress] : no acute distress [No Deformities] : no deformities [Normal Conjunctiva] : the conjunctiva exhibited no abnormalities [Eyelids - No Xanthelasma] : the eyelids demonstrated no xanthelasmas [Normal Oral Mucosa] : normal oral mucosa [No Oral Pallor] : no oral pallor [Normal Jugular Venous A Waves Present] : normal jugular venous A waves present [Normal Jugular Venous V Waves Present] : normal jugular venous V waves present [No Oral Cyanosis] : no oral cyanosis [Heart Rate And Rhythm] : heart rate and rhythm were normal [No Jugular Venous Shirley A Waves] : no jugular venous shirley A waves [Murmurs] : no murmurs present [Heart Sounds] : normal S1 and S2 [Exaggerated Use Of Accessory Muscles For Inspiration] : no accessory muscle use [Auscultation Breath Sounds / Voice Sounds] : lungs were clear to auscultation bilaterally [Respiration, Rhythm And Depth] : normal respiratory rhythm and effort [Abdomen Tenderness] : non-tender [Abdomen Mass (___ Cm)] : no abdominal mass palpated [Abdomen Soft] : soft [Abnormal Walk] : normal gait [Gait - Sufficient For Exercise Testing] : the gait was sufficient for exercise testing [Nail Clubbing] : no clubbing of the fingernails [Cyanosis, Localized] : no localized cyanosis [Petechial Hemorrhages (___cm)] : no petechial hemorrhages [Skin Color & Pigmentation] : normal skin color and pigmentation [] : no rash [Skin Lesions] : no skin lesions [No Venous Stasis] : no venous stasis [No Xanthoma] : no  xanthoma was observed [Oriented To Time, Place, And Person] : oriented to person, place, and time [No Skin Ulcers] : no skin ulcer [No Anxiety] : not feeling anxious [Affect] : the affect was normal [Mood] : the mood was normal

## 2020-05-18 ENCOUNTER — APPOINTMENT (OUTPATIENT)
Dept: CARDIOTHORACIC SURGERY | Facility: CLINIC | Age: 85
End: 2020-05-18
Payer: MEDICARE

## 2020-05-18 PROCEDURE — 99448 NTRPROF PH1/NTRNET/EHR 21-30: CPT

## 2020-05-18 RX ORDER — ASPIRIN 81 MG/1
81 TABLET ORAL DAILY
Qty: 90 | Refills: 0 | Status: ACTIVE | COMMUNITY
Start: 2020-05-18 | End: 1900-01-01

## 2020-05-19 RX ORDER — UMECLIDINIUM 62.5 UG/1
62.5 AEROSOL, POWDER ORAL
Refills: 0 | Status: ACTIVE | COMMUNITY

## 2020-05-21 NOTE — HISTORY OF PRESENT ILLNESS
[FreeTextEntry1] : refered by dr. 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

## 2020-05-21 NOTE — REASON FOR VISIT
[Initial Evaluation] : an initial evaluation of [FreeTextEntry2] : sob and chest pain and s/p mitral clip for severe mitral regurgitation

## 2020-06-22 ENCOUNTER — APPOINTMENT (OUTPATIENT)
Dept: CARDIOLOGY | Facility: CLINIC | Age: 85
End: 2020-06-22
Payer: MEDICARE

## 2020-06-22 DIAGNOSIS — I10 ESSENTIAL (PRIMARY) HYPERTENSION: ICD-10-CM

## 2020-06-22 DIAGNOSIS — I25.10 ATHEROSCLEROTIC HEART DISEASE OF NATIVE CORONARY ARTERY W/OUT ANGINA PECTORIS: ICD-10-CM

## 2020-06-22 PROCEDURE — 93306 TTE W/DOPPLER COMPLETE: CPT

## 2020-06-24 ENCOUNTER — APPOINTMENT (OUTPATIENT)
Dept: CARDIOLOGY | Facility: CLINIC | Age: 85
End: 2020-06-24
Payer: MEDICARE

## 2020-06-24 VITALS
DIASTOLIC BLOOD PRESSURE: 80 MMHG | BODY MASS INDEX: 25.89 KG/M2 | HEART RATE: 108 BPM | TEMPERATURE: 97.9 F | WEIGHT: 137 LBS | SYSTOLIC BLOOD PRESSURE: 136 MMHG

## 2020-06-24 DIAGNOSIS — I48.91 UNSPECIFIED ATRIAL FIBRILLATION: ICD-10-CM

## 2020-06-24 DIAGNOSIS — Z95.818 OTHER SPECIFIED POSTPROCEDURAL STATES: ICD-10-CM

## 2020-06-24 DIAGNOSIS — I34.0 NONRHEUMATIC MITRAL (VALVE) INSUFFICIENCY: ICD-10-CM

## 2020-06-24 DIAGNOSIS — Z98.890 OTHER SPECIFIED POSTPROCEDURAL STATES: ICD-10-CM

## 2020-06-24 DIAGNOSIS — E78.00 PURE HYPERCHOLESTEROLEMIA, UNSPECIFIED: ICD-10-CM

## 2020-06-24 PROCEDURE — 99214 OFFICE O/P EST MOD 30 MIN: CPT

## 2020-06-24 PROCEDURE — 93000 ELECTROCARDIOGRAM COMPLETE: CPT

## 2020-06-24 NOTE — HISTORY OF PRESENT ILLNESS
[FreeTextEntry1] : refered by dr. byrd for chest pain\par hypercholesteremia and htn are his risk factors for cad\par c/o exertional predictable chest pain, with associated sob\par always exertional, never at rest and relieved with rest\par no other cardiac problems\par \par ros is negative\par \par echo revealed moderate to severe mitral insufficiency\par ekg shows nsr, asmi and possible iwmi\par \par cath on 3/2020 revealed severe mitral insufficiency, mild [id number] htrn \par \par no new complaints\par no cp, sob or edema\par no new complaints since  last visit

## 2020-06-24 NOTE — PHYSICAL EXAM
[General Appearance - Well Developed] : well developed [Normal Appearance] : normal appearance [Well Groomed] : well groomed [General Appearance - Well Nourished] : well nourished [Normal Conjunctiva] : the conjunctiva exhibited no abnormalities [No Deformities] : no deformities [General Appearance - In No Acute Distress] : no acute distress [Eyelids - No Xanthelasma] : the eyelids demonstrated no xanthelasmas [Normal Oral Mucosa] : normal oral mucosa [No Oral Pallor] : no oral pallor [Normal Jugular Venous A Waves Present] : normal jugular venous A waves present [No Oral Cyanosis] : no oral cyanosis [Normal Jugular Venous V Waves Present] : normal jugular venous V waves present [No Jugular Venous Shirley A Waves] : no jugular venous shirley A waves [Exaggerated Use Of Accessory Muscles For Inspiration] : no accessory muscle use [Respiration, Rhythm And Depth] : normal respiratory rhythm and effort [Auscultation Breath Sounds / Voice Sounds] : lungs were clear to auscultation bilaterally [Heart Rate And Rhythm] : heart rate and rhythm were normal [Heart Sounds] : normal S1 and S2 [Murmurs] : no murmurs present [Abdomen Tenderness] : non-tender [Abdomen Soft] : soft [Gait - Sufficient For Exercise Testing] : the gait was sufficient for exercise testing [Abdomen Mass (___ Cm)] : no abdominal mass palpated [Abnormal Walk] : normal gait [Nail Clubbing] : no clubbing of the fingernails [Petechial Hemorrhages (___cm)] : no petechial hemorrhages [Cyanosis, Localized] : no localized cyanosis [] : no rash [Skin Color & Pigmentation] : normal skin color and pigmentation [No Venous Stasis] : no venous stasis [Skin Lesions] : no skin lesions [No Skin Ulcers] : no skin ulcer [Oriented To Time, Place, And Person] : oriented to person, place, and time [Affect] : the affect was normal [No Xanthoma] : no  xanthoma was observed [Mood] : the mood was normal [No Anxiety] : not feeling anxious

## 2020-09-30 ENCOUNTER — NON-APPOINTMENT (OUTPATIENT)
Age: 85
End: 2020-09-30

## 2020-10-28 ENCOUNTER — APPOINTMENT (OUTPATIENT)
Dept: CARDIOLOGY | Facility: CLINIC | Age: 85
End: 2020-10-28

## 2021-02-23 NOTE — H&P ADULT - NSHPSOCIALHISTORY_GEN_ALL_CORE
Former smoker    Lives with with patient Former smoker (30 pack years, quit 30 years ago).  Denies drug/ETOH use.  Resident of [place], visiting Son in [place] for the past 2 months.  Walks unassisted and performs ADLs without assistance.

## 2023-09-15 NOTE — DISCHARGE NOTE NURSING/CASE MANAGEMENT/SOCIAL WORK - PATIENT PORTAL LINK FT
Pt tolerated injection well.  No signs or symptoms of distress.
Negative Quantiferon Gold: 12/2022
Meds provided by [facility]
Next injection scheduled for 11/10/23 @ 8:00.
You can access the FollowMyHealth Patient Portal offered by [facility] by registering at the following website: [email or web address]. By joining [email or web address]’s FollowMyHealth portal, you will also be able to view your health information using other applications (apps) compatible with our system.

## 2023-12-29 NOTE — DISCHARGE NOTE PROVIDER - NSDCHOSPICE_GEN_A_CORE
I personally provided tobacco cessation education greater than 7 minutes. Patient currently smokes 2 PPD.  NRT   No

## 2024-09-02 NOTE — PATIENT PROFILE ADULT - NSPROEDALEARNPREF_GEN_A_NUR
mirilax daily, can adjust any dose. Should soften stool.      Hold blood pressure medications the day of surgery
no
individual instruction